# Patient Record
Sex: FEMALE | Race: WHITE | URBAN - METROPOLITAN AREA
[De-identification: names, ages, dates, MRNs, and addresses within clinical notes are randomized per-mention and may not be internally consistent; named-entity substitution may affect disease eponyms.]

---

## 2021-04-28 ENCOUNTER — VIRTUAL VISIT (OUTPATIENT)
Dept: PRIMARY CARE CLINIC | Age: 56
End: 2021-04-28
Payer: OTHER GOVERNMENT

## 2021-04-28 DIAGNOSIS — M15.9 OSTEOARTHRITIS INVOLVING MULTIPLE JOINTS ON BOTH SIDES OF BODY: ICD-10-CM

## 2021-04-28 DIAGNOSIS — F17.200 TOBACCO DEPENDENCE: ICD-10-CM

## 2021-04-28 DIAGNOSIS — E66.01 MORBID OBESITY WITH BMI OF 50.0-59.9, ADULT (HCC): ICD-10-CM

## 2021-04-28 DIAGNOSIS — Z00.00 ROUTINE ADULT HEALTH MAINTENANCE: ICD-10-CM

## 2021-04-28 DIAGNOSIS — R60.9 PERIPHERAL EDEMA: ICD-10-CM

## 2021-04-28 DIAGNOSIS — M48.07 SPINAL STENOSIS OF LUMBOSACRAL REGION: ICD-10-CM

## 2021-04-28 DIAGNOSIS — E03.9 HYPOTHYROIDISM, UNSPECIFIED TYPE: Primary | ICD-10-CM

## 2021-04-28 DIAGNOSIS — H61.23 BILATERAL IMPACTED CERUMEN: ICD-10-CM

## 2021-04-28 DIAGNOSIS — R53.83 FATIGUE, UNSPECIFIED TYPE: ICD-10-CM

## 2021-04-28 DIAGNOSIS — F32.A DEPRESSION, UNSPECIFIED DEPRESSION TYPE: ICD-10-CM

## 2021-04-28 PROBLEM — Z98.1 S/P LUMBAR FUSION: Status: ACTIVE | Noted: 2017-03-23

## 2021-04-28 PROBLEM — R60.0 BILATERAL EDEMA OF LOWER EXTREMITY: Status: ACTIVE | Noted: 2017-03-23

## 2021-04-28 PROCEDURE — 99203 OFFICE O/P NEW LOW 30 MIN: CPT | Performed by: INTERNAL MEDICINE

## 2021-04-28 RX ORDER — LEVOTHYROXINE SODIUM 0.05 MG/1
50 TABLET ORAL DAILY
Qty: 14 TABLET | Refills: 0 | Status: SHIPPED | OUTPATIENT
Start: 2021-04-28

## 2021-04-28 RX ORDER — LEVOTHYROXINE SODIUM 0.1 MG/1
100 TABLET ORAL DAILY
Qty: 90 TABLET | Refills: 1 | Status: SHIPPED | OUTPATIENT
Start: 2021-04-28 | End: 2021-10-27

## 2021-04-28 SDOH — ECONOMIC STABILITY: FOOD INSECURITY: WITHIN THE PAST 12 MONTHS, YOU WORRIED THAT YOUR FOOD WOULD RUN OUT BEFORE YOU GOT MONEY TO BUY MORE.: SOMETIMES TRUE

## 2021-04-28 SDOH — ECONOMIC STABILITY: TRANSPORTATION INSECURITY
IN THE PAST 12 MONTHS, HAS THE LACK OF TRANSPORTATION KEPT YOU FROM MEDICAL APPOINTMENTS OR FROM GETTING MEDICATIONS?: NO

## 2021-04-28 SDOH — ECONOMIC STABILITY: INCOME INSECURITY: HOW HARD IS IT FOR YOU TO PAY FOR THE VERY BASICS LIKE FOOD, HOUSING, MEDICAL CARE, AND HEATING?: SOMEWHAT HARD

## 2021-04-28 SDOH — ECONOMIC STABILITY: FOOD INSECURITY: WITHIN THE PAST 12 MONTHS, THE FOOD YOU BOUGHT JUST DIDN'T LAST AND YOU DIDN'T HAVE MONEY TO GET MORE.: SOMETIMES TRUE

## 2021-04-28 ASSESSMENT — PATIENT HEALTH QUESTIONNAIRE - PHQ9
SUM OF ALL RESPONSES TO PHQ QUESTIONS 1-9: 0
SUM OF ALL RESPONSES TO PHQ9 QUESTIONS 1 & 2: 0
1. LITTLE INTEREST OR PLEASURE IN DOING THINGS: 0

## 2021-04-28 NOTE — PROGRESS NOTES
2021    Anya Gomez (:  1965) is a 49 y.o. female, here for evaluation of the following medical concerns:    Chief Complaint   Patient presents with   [address]     hypothyroid has not been on any med in a year       HPI  53-year-old [facility] female with history of morbid obesity BMI 50, nicotine dependence, hypothyroidism historically prescribed 100 mcg Synthroid, anxiety/depression prescribed Prozac 40 bupropion 150 apparently plan to transition to Effexor in 2018, GERD previously on Prilosec now diet controlled without solid food dysphagia,, lumbar degenerative joint disease status post L4-L5 fusion in 2016, lower extremity edema who attends virtually to establish care. She relocated with her  to [place] after living in [place] for many years. I am able to review the notes from her providers in [place], her care has been frustrated with noncompliance at least in part due to patient apathy and depression. She tells me that she stopped her medications 6 months to 2 years ago and needs to resume her thyroid medication. She states that she was told she had permanent nerve damage from her spinal stenosis and/or lumbar radiculopathy. She endorses feeling unsteady on her feet, not true vertigo, worse when she closes her eyes. She ambulates with either a cane or walker depending on how far she is walking. She is unable to lay flat. Was being worked up for sleep apnea in 2018, patient chose not to pursue. Last mammogram 10 years ago she estimates. Has never had a colonoscopy. Family history of breast cancer and colon cancer in second-degree relatives. Status post tonsillectomy tubal ligation oophorectomy lumbar fusion. Last blood work in care everywhere hemoglobin 16.3 creatinine 0.7 hemoglobin A1c 6.2%  triglycerides 140 normal LFTs     HPV Pap -.     From the [place] chart, family history clotting disorder mother, the patient claims no knowledge of this. Colon Cancer maternal aunt coronary artery disease mother and father diabetes mother and father breast cancer maternal grandmother. She has doctored at [facility] and in [place], moved October 2021 to [place] with her . Last outpatient clinic visit 2018. Since then was seen in urgent care some phone calls, some dental appointments for dental caries, all in [place]. Review of Systems   Constitutional: Positive for activity change, but not appetite change and unexpected weight change. HENT: Negative for dental problem, sinus pain, sore throat and trouble swallowing. Eyes: Negative for pain and visual disturbance. Respiratory: Negative for apnea, cough, chest tightness, shortness of breath and wheezing. Cardiovascular: Negative for chest pain and palpitations. Positive for peripheral edema. Gastrointestinal: Negative for abdominal pain, blood in stool, constipation, diarrhea, nausea, rectal pain and vomiting. Endocrine: Negative for cold intolerance, heat intolerance, polydipsia, polyphagia and polyuria. Genitourinary: Negative for difficulty urinating, dysuria, flank pain, frequency, hematuria, pelvic pain, urgency, vaginal bleeding and vaginal discharge. Musculoskeletal: Positive for arthralgias, back pain, gait problem, but not joint swelling, myalgias, neck pain and neck stiffness. Skin: Negative for color change and rash. Neurological: Negative for dizziness, tremors, syncope, speech difficulty, weakness, light-headedness and headaches. Hematological: Negative for adenopathy. Does not bruise/bleed easily. Psychiatric/Behavioral: Negative for agitation, behavioral problems,and suicidal ideas. The patient is not nervous/anxious and is not hyperactive. Positive for   decreased concentration, sleep disturbance       Prior to Visit Medications    Medication Sig Taking?  Authorizing Provider   levothyroxine (SYNTHROID) 50 MCG tablet Take 1 tablet by mouth daily Yes Matt Ellington MD   levothyroxine (SYNTHROID) 100 MCG tablet Take 1 tablet by mouth daily Start after 14 days 50mcg synthroid Yes Matt Ellington MD   carbamide peroxide (DEBROX) 6.5 % otic solution Place 5 drops into both ears 2 times daily Yes Matt Ellington MD        Allergies   Allergen Reactions    Iodides Hives and Shortness Of Breath       No past medical history on file.     Past Surgical History:   Procedure Laterality Date    ENDOMETRIAL ABLATION      x 2    OVARY REMOVAL Left     SPINAL FUSION         Social History     Socioeconomic History    Marital status:      Spouse name: Not on file    Number of children: Not on file    Years of education: Not on file    Highest education level: Not on file   Occupational History    Not on file   Social Needs    Financial resource strain: Somewhat hard    Food insecurity     Worry: Sometimes true     Inability: Sometimes true    Transportation needs     Medical: No     Non-medical: No   Tobacco Use    Smoking status: Current Every Day Smoker     Packs/day: 1.00     Years: 14.00     Pack years: 14.00     Types: Cigarettes     Start date: 0    Smokeless tobacco: Never Used   Substance and Sexual Activity    Alcohol use: Never     Frequency: Never     Binge frequency: Never    Drug use: Never    Sexual activity: Not on file   Lifestyle    Physical activity     Days per week: Not on file     Minutes per session: Not on file    Stress: Not on file   Relationships    Social connections     Talks on phone: Not on file     Gets together: Not on file     Attends Sabianism service: Not on file     Active member of club or organization: Not on file     Attends meetings of clubs or organizations: Not on file     Relationship status: Not on file    Intimate partner violence     Fear of current or ex partner: Not on file     Emotionally abused: Not on file     Physically abused: Not on file     Forced sexual activity: Not on file   Other Topics Concern    Not on file   Social History Narrative    Not on file        No family history on file. There were no vitals filed for this visit. There is no height or weight on file to calculate BMI. PHYSICAL EXAM  GENERAL:  Pleasant  female who looks her stated age, awake alert and oriented x3, no acute distress. PSYCH: Moderate psychomotor retardation. Good eye contact. Flattened affect range. Mood congruent with affect. Linear thought. LABS  Lab Review   No results found for any previous visit. ASSESSMENT/PLAN  51-year-old [facility] female with medically complicated obesity depression hypothyroidism probable obstructive sleep apnea probable diffuse joint disease with persistent central mediated paresis and paresthesia involving the lower extremities resulting in disequilibrium when she attempts to stand with her eyes closed, at high risk for falls, with a history of medication noncompliance perhaps due to undertreated depression. Patient's lack of sustained engagement coupled with her obesity related complications portends poor prognosis. 1. Hypothyroidism, unspecified type  We will start uptitrating Synthroid 50 mcg for 2 weeks then 100 mcg. Check TSH in 2 months to assess adequacy of replacement.  - TSH with Reflex; Future    2. Bilateral impacted cerumen  Start Debrox, will flush in clinic as warranted. - carbamide peroxide (DEBROX) 6.5 % otic solution; Place 5 drops into both ears 2 times daily  Dispense: 1 Bottle; Refill: 0    3. Osteoarthritis involving multiple joints on both sides of body  Patient endorses pain given the weakness stiffness due to progressive obesity, noncompliance, depression, probably not a great candidate for joint replacement.   We will try to work on strengthening, assess follow-through then and only then proceed with imaging orthopedics referral.    Patient also describes disequilibrium when she closes her eyes while standing, but denies spinning sensation, tinnitus. For some reason prescribed meclizine in the past, does not recognize Ave-Hallpike maneuver. I doubt she needs meclizine and explained that her disequilibrium was most likely due to loss of sensation and strength in her lower extremities which is compounded by loss of visual cueing when she closes her eyes. - DME Order for Bath/Shower Seat as OP  - [facility] [place]  -CRP, Rheum Factor    4. Morbid obesity with BMI of 50.0-59.9, adult ([facility] [address]  Can try referral to weight loss solutions if patient wishes, not interested in bariatric surgery, and of course medications only provide short-term benefit. Will discuss sleep apnea screening benefits at follow-up if patient attends. - DME Order for Bath/Shower Seat as OP  - [facility] [place]    5. Spinal stenosis of lumbosacral region  Status post L4-L5 fusion. Patient states that she was told she has \"permanent nerve damage\" as result of her lumbosacral disease. Examined on follow-up for myotomal loss of strength vs deconditioning, as well as altered sensation.  - DME Order for Bath/Shower Seat as OP  - [facility] [place]    6. Tobacco dependence  Unfortunately precontemplative    7. Depression, unspecified depression type  Her former provider was considering escalation to venlafaxine, consider referral to psychiatry. 8. Routine adult health maintenance  Covid vaccination 2021. Declines mammogram, colon cancer screening. Screen for progression to diabetes given history of impaired fasting glucose, also iron deficiency, nonalcoholic fatty liver disease, dyslipidemia, hepatitis C and HIV. Discussreferral for sleep apnea screen at follow-up. Uncertain about Tdap Pneumovax 23 status. Return in about 2 months (around 6/28/2021), or after labs. It was a pleasure to visit with MsMarco A Howie today. Answered all questions as best I could. Vincenzo Boyle MD   Leonetta Child, was evaluated through a synchronous (real-time) audio-video encounter. The patient (or guardian if applicable) is aware that this is a billable service. Verbal consent to proceed has been obtained within the past 12 months. The visit was conducted pursuant to the emergency declaration under the [address] [place] [address] and the [employer] Act. Patient identification was verified, and a caregiver was present when appropriate. The patient was located in a state where the provider was credentialed to provide care. Total time spent for this encounter: 43 minutes    --Vincenzo Boyle MD on 4/28/2021 at 8:52 PM    An electronic signature was used to authenticate this note.

## 2021-08-02 ENCOUNTER — NURSE TRIAGE (OUTPATIENT)
Dept: OTHER | Facility: CLINIC | Age: 56
End: 2021-08-02

## 2021-08-02 NOTE — TELEPHONE ENCOUNTER
Patient called [facility] with red flag complaint to establish care with Dr Karen Oseguera. Brief description of triage: as above pt c/o sob over the past year getting worse states mod sob no sob at rest but takes 10 steps and gets sob no fevers no cp occas wheezing state has had breathing issues since having covid a long time ago    Triage indicates for patient to be seen today    Care advice provided, patient verbalizes understanding; denies any other questions or concerns; instructed to call back for any new or worsening symptoms. Writer provided warm transfer to [facility] for appointment scheduling. Attention Provider: Thank you for allowing me to participate in the care of your patient. The patient was connected to triage in response to information provided to the [facility]. Please do not respond through this encounter as the response is not directed to a shared pool. Reason for Disposition   MILD difficulty breathing (e.g., minimal/no SOB at rest, SOB with walking, pulse < 100) of new onset or worse than normal    Answer Assessment - Initial Assessment Questions  1. RESPIRATORY STATUS: \"Describe your breathing? \" (e.g., wheezing, shortness of breath, unable to speak, severe coughing)       Sob with activity    2. ONSET: \"When did this breathing problem begin? \"       1 year    3. PATTERN \"Does the difficult breathing come and go, or has it been constant since it started? \"       Comes and goes    4. SEVERITY: \"How bad is your breathing? \" (e.g., mild, moderate, severe)     - MILD: No SOB at rest, mild SOB with walking, speaks normally in sentences, can lay down, no retractions, pulse < 100.     - MODERATE: SOB at rest, SOB with minimal exertion and prefers to sit, cannot lie down flat, speaks in phrases, mild retractions, audible wheezing, pulse 100-120.     - SEVERE: Very SOB at rest, speaks in single words, struggling to breathe, sitting hunched forward, retractions, pulse > 120 Mod    5. RECURRENT SYMPTOM: \"Have you had difficulty breathing before? \" If so, ask: \"When was the last time? \" and \"What happened that time? \"       On/off over the past year thinks had covid    6. CARDIAC HISTORY: \"Do you have any history of heart disease? \" (e.g., heart attack, angina, bypass surgery, angioplasty)       Denies    7. LUNG HISTORY: \"Do you have any history of lung disease? \"  (e.g., pulmonary embolus, asthma, emphysema)      Denies    8. CAUSE: \"What do you think is causing the breathing problem? \"       Unsure possible effects from covid    9. OTHER SYMPTOMS: \"Do you have any other symptoms? (e.g., dizziness, runny nose, cough, chest pain, fever)      Cough and some dizziness    10. PREGNANCY: \"Is there any chance you are pregnant? \" \"When was your last menstrual period? \"        N/a    11. TRAVEL: \"Have you traveled out of the country in the last month? \" (e.g., travel history, exposures)        denies    Protocols used: BREATHING DIFFICULTY-ADULT-[place]

## 2021-11-04 ENCOUNTER — TELEPHONE (OUTPATIENT)
Dept: PRIMARY CARE CLINIC | Age: 56
End: 2021-11-04

## 2021-11-04 RX ORDER — LEVOTHYROXINE SODIUM 0.1 MG/1
TABLET ORAL
Qty: 30 TABLET | Refills: 0 | OUTPATIENT
Start: 2021-11-04

## 2025-04-23 ENCOUNTER — APPOINTMENT (OUTPATIENT)
Dept: GENERAL RADIOLOGY | Age: 60
DRG: 193 | End: 2025-04-23
Payer: OTHER GOVERNMENT

## 2025-04-23 ENCOUNTER — APPOINTMENT (OUTPATIENT)
Dept: CT IMAGING | Age: 60
DRG: 193 | End: 2025-04-23
Payer: OTHER GOVERNMENT

## 2025-04-23 ENCOUNTER — HOSPITAL ENCOUNTER (INPATIENT)
Age: 60
LOS: 13 days | Discharge: NURSING, REHAB OR LONG-TERM CARE | DRG: 193 | End: 2025-05-07
Attending: STUDENT IN AN ORGANIZED HEALTH CARE EDUCATION/TRAINING PROGRAM | Admitting: INTERNAL MEDICINE
Payer: OTHER GOVERNMENT

## 2025-04-23 DIAGNOSIS — R06.02 SHORTNESS OF BREATH: ICD-10-CM

## 2025-04-23 DIAGNOSIS — J18.9 PNEUMONIA DUE TO INFECTIOUS ORGANISM, UNSPECIFIED LATERALITY, UNSPECIFIED PART OF LUNG: Primary | ICD-10-CM

## 2025-04-23 DIAGNOSIS — J96.01 ACUTE RESPIRATORY FAILURE WITH HYPOXIA (HCC): ICD-10-CM

## 2025-04-23 DIAGNOSIS — J90 PLEURAL EFFUSION: ICD-10-CM

## 2025-04-23 DIAGNOSIS — E27.8 ADRENAL MASS: ICD-10-CM

## 2025-04-23 DIAGNOSIS — R59.0 MEDIASTINAL LYMPHADENOPATHY: ICD-10-CM

## 2025-04-23 DIAGNOSIS — J44.1 COPD EXACERBATION (HCC): ICD-10-CM

## 2025-04-23 LAB
ALBUMIN SERPL-MCNC: 2.7 G/DL (ref 3.4–5)
ALBUMIN/GLOB SERPL: 0.6 {RATIO} (ref 1.1–2.2)
ALP SERPL-CCNC: 295 U/L (ref 40–129)
ALT SERPL-CCNC: 145 U/L (ref 10–40)
ANION GAP SERPL CALCULATED.3IONS-SCNC: 12 MMOL/L (ref 3–16)
ANISOCYTOSIS BLD QL SMEAR: ABNORMAL
AST SERPL-CCNC: 87 U/L (ref 15–37)
BASE EXCESS BLDV CALC-SCNC: 4.1 MMOL/L (ref -3–3)
BASOPHILS # BLD: 0 K/UL (ref 0–0.2)
BASOPHILS NFR BLD: 0 %
BILIRUB SERPL-MCNC: 0.9 MG/DL (ref 0–1)
BUN SERPL-MCNC: 17 MG/DL (ref 7–20)
CALCIUM SERPL-MCNC: 9.1 MG/DL (ref 8.3–10.6)
CHLORIDE SERPL-SCNC: 100 MMOL/L (ref 99–110)
CO2 BLDV-SCNC: 69 MMOL/L
CO2 SERPL-SCNC: 25 MMOL/L (ref 21–32)
COHGB MFR BLDV: 3.4 % (ref 0–1.5)
CREAT SERPL-MCNC: 0.6 MG/DL (ref 0.6–1.1)
D-DIMER QUANTITATIVE: 3.09 UG/ML FEU (ref 0–0.6)
DEPRECATED RDW RBC AUTO: 18.3 % (ref 12.4–15.4)
DO-HGB MFR BLDV: 3 %
EOSINOPHIL # BLD: 0 K/UL (ref 0–0.6)
EOSINOPHIL NFR BLD: 0 %
FLUAV RNA RESP QL NAA+PROBE: NOT DETECTED
FLUBV RNA RESP QL NAA+PROBE: NOT DETECTED
GFR SERPLBLD CREATININE-BSD FMLA CKD-EPI: >90 ML/MIN/{1.73_M2}
GLUCOSE SERPL-MCNC: 182 MG/DL (ref 70–99)
HCO3 BLDV-SCNC: 29.5 MMOL/L (ref 23–29)
HCT VFR BLD AUTO: 43.9 % (ref 36–48)
HGB BLD-MCNC: 14.2 G/DL (ref 12–16)
LACTATE BLDV-SCNC: 1 MMOL/L (ref 0.4–1.9)
LACTATE BLDV-SCNC: 1.1 MMOL/L (ref 0.4–1.9)
LYMPHOCYTES # BLD: 1 K/UL (ref 1–5.1)
LYMPHOCYTES NFR BLD: 3 %
MCH RBC QN AUTO: 26.5 PG (ref 26–34)
MCHC RBC AUTO-ENTMCNC: 32.3 G/DL (ref 31–36)
MCV RBC AUTO: 81.9 FL (ref 80–100)
METAMYELOCYTES NFR BLD MANUAL: 2 %
METHGB MFR BLDV: 0 %
MONOCYTES # BLD: 1 K/UL (ref 0–1.3)
MONOCYTES NFR BLD: 3 %
NEUTROPHILS # BLD: 29.9 K/UL (ref 1.7–7.7)
NEUTROPHILS NFR BLD: 92 %
NT-PROBNP SERPL-MCNC: 169 PG/ML (ref 0–124)
O2 CT VFR BLDV CALC: 20 VOL %
O2 THERAPY: ABNORMAL
PCO2 BLDV: 45.8 MMHG (ref 40–50)
PH BLDV: 7.42 [PH] (ref 7.35–7.45)
PLATELET # BLD AUTO: 346 K/UL (ref 135–450)
PLATELET BLD QL SMEAR: ADEQUATE
PMV BLD AUTO: 9.1 FL (ref 5–10.5)
PO2 BLDV: 95.9 MMHG (ref 25–40)
POLYCHROMASIA BLD QL SMEAR: ABNORMAL
POTASSIUM SERPL-SCNC: 4 MMOL/L (ref 3.5–5.1)
PROT SERPL-MCNC: 7.1 G/DL (ref 6.4–8.2)
RBC # BLD AUTO: 5.36 M/UL (ref 4–5.2)
SAO2 % BLDV: 97 %
SARS-COV-2 RNA RESP QL NAA+PROBE: NOT DETECTED
SLIDE REVIEW: ABNORMAL
SODIUM SERPL-SCNC: 137 MMOL/L (ref 136–145)
TARGETS BLD QL SMEAR: ABNORMAL
TROPONIN, HIGH SENSITIVITY: 14 NG/L (ref 0–14)
TROPONIN, HIGH SENSITIVITY: 14 NG/L (ref 0–14)
WBC # BLD AUTO: 31.8 K/UL (ref 4–11)

## 2025-04-23 PROCEDURE — 71045 X-RAY EXAM CHEST 1 VIEW: CPT

## 2025-04-23 PROCEDURE — 84145 PROCALCITONIN (PCT): CPT

## 2025-04-23 PROCEDURE — 94761 N-INVAS EAR/PLS OXIMETRY MLT: CPT

## 2025-04-23 PROCEDURE — 93005 ELECTROCARDIOGRAM TRACING: CPT | Performed by: STUDENT IN AN ORGANIZED HEALTH CARE EDUCATION/TRAINING PROGRAM

## 2025-04-23 PROCEDURE — 84484 ASSAY OF TROPONIN QUANT: CPT

## 2025-04-23 PROCEDURE — 85379 FIBRIN DEGRADATION QUANT: CPT

## 2025-04-23 PROCEDURE — 2580000003 HC RX 258: Performed by: STUDENT IN AN ORGANIZED HEALTH CARE EDUCATION/TRAINING PROGRAM

## 2025-04-23 PROCEDURE — 80053 COMPREHEN METABOLIC PANEL: CPT

## 2025-04-23 PROCEDURE — 5A0955A ASSISTANCE WITH RESPIRATORY VENTILATION, GREATER THAN 96 CONSECUTIVE HOURS, HIGH NASAL FLOW/VELOCITY: ICD-10-PCS | Performed by: INTERNAL MEDICINE

## 2025-04-23 PROCEDURE — 96365 THER/PROPH/DIAG IV INF INIT: CPT

## 2025-04-23 PROCEDURE — 71260 CT THORAX DX C+: CPT

## 2025-04-23 PROCEDURE — 2700000000 HC OXYGEN THERAPY PER DAY

## 2025-04-23 PROCEDURE — 83880 ASSAY OF NATRIURETIC PEPTIDE: CPT

## 2025-04-23 PROCEDURE — 6360000004 HC RX CONTRAST MEDICATION: Performed by: STUDENT IN AN ORGANIZED HEALTH CARE EDUCATION/TRAINING PROGRAM

## 2025-04-23 PROCEDURE — 87040 BLOOD CULTURE FOR BACTERIA: CPT

## 2025-04-23 PROCEDURE — 6360000002 HC RX W HCPCS: Performed by: STUDENT IN AN ORGANIZED HEALTH CARE EDUCATION/TRAINING PROGRAM

## 2025-04-23 PROCEDURE — 94640 AIRWAY INHALATION TREATMENT: CPT

## 2025-04-23 PROCEDURE — 96375 TX/PRO/DX INJ NEW DRUG ADDON: CPT

## 2025-04-23 PROCEDURE — 2500000003 HC RX 250 WO HCPCS: Performed by: STUDENT IN AN ORGANIZED HEALTH CARE EDUCATION/TRAINING PROGRAM

## 2025-04-23 PROCEDURE — 85025 COMPLETE CBC W/AUTO DIFF WBC: CPT

## 2025-04-23 PROCEDURE — 83605 ASSAY OF LACTIC ACID: CPT

## 2025-04-23 PROCEDURE — 99285 EMERGENCY DEPT VISIT HI MDM: CPT

## 2025-04-23 PROCEDURE — 87636 SARSCOV2 & INF A&B AMP PRB: CPT

## 2025-04-23 PROCEDURE — 82803 BLOOD GASES ANY COMBINATION: CPT

## 2025-04-23 RX ORDER — INSULIN GLARGINE 100 [IU]/ML
20 INJECTION, SOLUTION SUBCUTANEOUS NIGHTLY
Status: ON HOLD | COMMUNITY

## 2025-04-23 RX ORDER — HYDROXYZINE PAMOATE 25 MG/1
25 CAPSULE ORAL 3 TIMES DAILY PRN
Status: ON HOLD | COMMUNITY

## 2025-04-23 RX ORDER — DIPHENHYDRAMINE HYDROCHLORIDE 50 MG/ML
50 INJECTION, SOLUTION INTRAMUSCULAR; INTRAVENOUS ONCE
Status: COMPLETED | OUTPATIENT
Start: 2025-04-23 | End: 2025-04-23

## 2025-04-23 RX ORDER — IOPAMIDOL 755 MG/ML
75 INJECTION, SOLUTION INTRAVASCULAR
Status: COMPLETED | OUTPATIENT
Start: 2025-04-23 | End: 2025-04-23

## 2025-04-23 RX ORDER — LEVALBUTEROL INHALATION SOLUTION 1.25 MG/3ML
1.25 SOLUTION RESPIRATORY (INHALATION) ONCE
Status: COMPLETED | OUTPATIENT
Start: 2025-04-23 | End: 2025-04-23

## 2025-04-23 RX ORDER — OXYBUTYNIN CHLORIDE 15 MG/1
15 TABLET, EXTENDED RELEASE ORAL DAILY
Status: ON HOLD | COMMUNITY

## 2025-04-23 RX ORDER — FUROSEMIDE 10 MG/ML
40 INJECTION INTRAMUSCULAR; INTRAVENOUS ONCE
Status: COMPLETED | OUTPATIENT
Start: 2025-04-23 | End: 2025-04-23

## 2025-04-23 RX ADMIN — AZITHROMYCIN MONOHYDRATE 500 MG: 500 INJECTION, POWDER, LYOPHILIZED, FOR SOLUTION INTRAVENOUS at 22:29

## 2025-04-23 RX ADMIN — FUROSEMIDE 40 MG: 10 INJECTION, SOLUTION INTRAMUSCULAR; INTRAVENOUS at 21:54

## 2025-04-23 RX ADMIN — DIPHENHYDRAMINE HYDROCHLORIDE 50 MG: 50 INJECTION INTRAMUSCULAR; INTRAVENOUS at 21:54

## 2025-04-23 RX ADMIN — WATER 1000 MG: 1 INJECTION INTRAMUSCULAR; INTRAVENOUS; SUBCUTANEOUS at 22:26

## 2025-04-23 RX ADMIN — LEVALBUTEROL HYDROCHLORIDE 1.25 MG: 1.25 SOLUTION RESPIRATORY (INHALATION) at 20:40

## 2025-04-23 RX ADMIN — WATER 125 MG: 1 INJECTION INTRAMUSCULAR; INTRAVENOUS; SUBCUTANEOUS at 21:54

## 2025-04-23 RX ADMIN — IOPAMIDOL 75 ML: 755 INJECTION, SOLUTION INTRAVENOUS at 22:05

## 2025-04-23 ASSESSMENT — LIFESTYLE VARIABLES
HOW OFTEN DO YOU HAVE A DRINK CONTAINING ALCOHOL: NEVER
HOW MANY STANDARD DRINKS CONTAINING ALCOHOL DO YOU HAVE ON A TYPICAL DAY: PATIENT DOES NOT DRINK

## 2025-04-24 PROBLEM — J18.9 PNEUMONIA DUE TO INFECTIOUS AGENT: Status: ACTIVE | Noted: 2025-04-24

## 2025-04-24 LAB
ALBUMIN SERPL-MCNC: 2.6 G/DL (ref 3.4–5)
ALP SERPL-CCNC: 353 U/L (ref 40–129)
ALT SERPL-CCNC: 133 U/L (ref 10–40)
ANION GAP SERPL CALCULATED.3IONS-SCNC: 12 MMOL/L (ref 3–16)
ANISOCYTOSIS BLD QL SMEAR: ABNORMAL
AST SERPL-CCNC: 68 U/L (ref 15–37)
BASOPHILS # BLD: 0 K/UL (ref 0–0.2)
BASOPHILS NFR BLD: 0 %
BILIRUB DIRECT SERPL-MCNC: 0.6 MG/DL (ref 0–0.3)
BILIRUB INDIRECT SERPL-MCNC: 0.2 MG/DL (ref 0–1)
BILIRUB SERPL-MCNC: 0.8 MG/DL (ref 0–1)
BUN SERPL-MCNC: 18 MG/DL (ref 7–20)
CALCIUM SERPL-MCNC: 8.9 MG/DL (ref 8.3–10.6)
CHLORIDE SERPL-SCNC: 100 MMOL/L (ref 99–110)
CO2 SERPL-SCNC: 26 MMOL/L (ref 21–32)
CREAT SERPL-MCNC: 0.7 MG/DL (ref 0.6–1.1)
DEPRECATED RDW RBC AUTO: 18 % (ref 12.4–15.4)
EKG ATRIAL RATE: 121 BPM
EKG DIAGNOSIS: NORMAL
EKG P AXIS: 72 DEGREES
EKG P-R INTERVAL: 150 MS
EKG Q-T INTERVAL: 314 MS
EKG QRS DURATION: 66 MS
EKG QTC CALCULATION (BAZETT): 445 MS
EKG R AXIS: 69 DEGREES
EKG T AXIS: 45 DEGREES
EKG VENTRICULAR RATE: 121 BPM
EOSINOPHIL # BLD: 0 K/UL (ref 0–0.6)
EOSINOPHIL NFR BLD: 0 %
GFR SERPLBLD CREATININE-BSD FMLA CKD-EPI: >90 ML/MIN/{1.73_M2}
GLUCOSE BLD-MCNC: 241 MG/DL (ref 70–99)
GLUCOSE BLD-MCNC: 292 MG/DL (ref 70–99)
GLUCOSE BLD-MCNC: 343 MG/DL (ref 70–99)
GLUCOSE BLD-MCNC: 382 MG/DL (ref 70–99)
GLUCOSE BLD-MCNC: 387 MG/DL (ref 70–99)
GLUCOSE BLD-MCNC: 402 MG/DL (ref 70–99)
GLUCOSE SERPL-MCNC: 315 MG/DL (ref 70–99)
HCT VFR BLD AUTO: 41.7 % (ref 36–48)
HGB BLD-MCNC: 13.7 G/DL (ref 12–16)
LYMPHOCYTES # BLD: 1.3 K/UL (ref 1–5.1)
LYMPHOCYTES NFR BLD: 4 %
MCH RBC QN AUTO: 26.8 PG (ref 26–34)
MCHC RBC AUTO-ENTMCNC: 32.9 G/DL (ref 31–36)
MCV RBC AUTO: 81.6 FL (ref 80–100)
METAMYELOCYTES NFR BLD MANUAL: 1 %
MONOCYTES # BLD: 0.3 K/UL (ref 0–1.3)
MONOCYTES NFR BLD: 1 %
NEUTROPHILS # BLD: 31.2 K/UL (ref 1.7–7.7)
NEUTROPHILS NFR BLD: 94 %
PERFORMED ON: ABNORMAL
PLATELET # BLD AUTO: 365 K/UL (ref 135–450)
PLATELET BLD QL SMEAR: ADEQUATE
PMV BLD AUTO: 9.4 FL (ref 5–10.5)
POLYCHROMASIA BLD QL SMEAR: ABNORMAL
POTASSIUM SERPL-SCNC: 4.7 MMOL/L (ref 3.5–5.1)
PROCALCITONIN SERPL IA-MCNC: 0.45 NG/ML (ref 0–0.15)
PROT SERPL-MCNC: 7 G/DL (ref 6.4–8.2)
RBC # BLD AUTO: 5.11 M/UL (ref 4–5.2)
REPORT: NORMAL
RESP PATH DNA+RNA PNL NPH NAA+NON-PROBE: NORMAL
SLIDE REVIEW: ABNORMAL
SODIUM SERPL-SCNC: 138 MMOL/L (ref 136–145)
TSH SERPL DL<=0.005 MIU/L-ACNC: 0.85 UIU/ML (ref 0.27–4.2)
WBC # BLD AUTO: 32.8 K/UL (ref 4–11)

## 2025-04-24 PROCEDURE — 94761 N-INVAS EAR/PLS OXIMETRY MLT: CPT

## 2025-04-24 PROCEDURE — 0202U NFCT DS 22 TRGT SARS-COV-2: CPT

## 2025-04-24 PROCEDURE — 84443 ASSAY THYROID STIM HORMONE: CPT

## 2025-04-24 PROCEDURE — 80048 BASIC METABOLIC PNL TOTAL CA: CPT

## 2025-04-24 PROCEDURE — 80076 HEPATIC FUNCTION PANEL: CPT

## 2025-04-24 PROCEDURE — 2500000003 HC RX 250 WO HCPCS: Performed by: INTERNAL MEDICINE

## 2025-04-24 PROCEDURE — 2000000000 HC ICU R&B

## 2025-04-24 PROCEDURE — 6370000000 HC RX 637 (ALT 250 FOR IP): Performed by: HOSPITALIST

## 2025-04-24 PROCEDURE — 94150 VITAL CAPACITY TEST: CPT

## 2025-04-24 PROCEDURE — 94640 AIRWAY INHALATION TREATMENT: CPT

## 2025-04-24 PROCEDURE — 2580000003 HC RX 258: Performed by: INTERNAL MEDICINE

## 2025-04-24 PROCEDURE — 94660 CPAP INITIATION&MGMT: CPT

## 2025-04-24 PROCEDURE — 99291 CRITICAL CARE FIRST HOUR: CPT | Performed by: INTERNAL MEDICINE

## 2025-04-24 PROCEDURE — 6370000000 HC RX 637 (ALT 250 FOR IP)

## 2025-04-24 PROCEDURE — 2700000000 HC OXYGEN THERAPY PER DAY

## 2025-04-24 PROCEDURE — 85025 COMPLETE CBC W/AUTO DIFF WBC: CPT

## 2025-04-24 PROCEDURE — 93010 ELECTROCARDIOGRAM REPORT: CPT | Performed by: INTERNAL MEDICINE

## 2025-04-24 PROCEDURE — 6360000002 HC RX W HCPCS: Performed by: INTERNAL MEDICINE

## 2025-04-24 PROCEDURE — 5A09457 ASSISTANCE WITH RESPIRATORY VENTILATION, 24-96 CONSECUTIVE HOURS, CONTINUOUS POSITIVE AIRWAY PRESSURE: ICD-10-PCS | Performed by: INTERNAL MEDICINE

## 2025-04-24 PROCEDURE — 87449 NOS EACH ORGANISM AG IA: CPT

## 2025-04-24 PROCEDURE — 6370000000 HC RX 637 (ALT 250 FOR IP): Performed by: INTERNAL MEDICINE

## 2025-04-24 RX ORDER — HYDROXYZINE PAMOATE 25 MG/1
25 CAPSULE ORAL 3 TIMES DAILY PRN
Status: DISCONTINUED | OUTPATIENT
Start: 2025-04-24 | End: 2025-05-07 | Stop reason: HOSPADM

## 2025-04-24 RX ORDER — ENOXAPARIN SODIUM 100 MG/ML
40 INJECTION SUBCUTANEOUS 2 TIMES DAILY
Status: DISCONTINUED | OUTPATIENT
Start: 2025-04-24 | End: 2025-05-07 | Stop reason: HOSPADM

## 2025-04-24 RX ORDER — INSULIN LISPRO 100 [IU]/ML
0-8 INJECTION, SOLUTION INTRAVENOUS; SUBCUTANEOUS
Status: DISCONTINUED | OUTPATIENT
Start: 2025-04-24 | End: 2025-04-25

## 2025-04-24 RX ORDER — INSULIN GLARGINE 100 [IU]/ML
20 INJECTION, SOLUTION SUBCUTANEOUS NIGHTLY
Status: DISCONTINUED | OUTPATIENT
Start: 2025-04-24 | End: 2025-04-25

## 2025-04-24 RX ORDER — GUAIFENESIN 600 MG/1
600 TABLET, EXTENDED RELEASE ORAL 2 TIMES DAILY
Status: DISCONTINUED | OUTPATIENT
Start: 2025-04-24 | End: 2025-04-24 | Stop reason: SDUPTHER

## 2025-04-24 RX ORDER — LEVOTHYROXINE SODIUM 112 UG/1
112 TABLET ORAL
Status: ON HOLD | COMMUNITY
Start: 2025-04-18 | End: 2025-04-24 | Stop reason: CLARIF

## 2025-04-24 RX ORDER — IPRATROPIUM BROMIDE AND ALBUTEROL SULFATE 2.5; .5 MG/3ML; MG/3ML
SOLUTION RESPIRATORY (INHALATION)
Status: COMPLETED
Start: 2025-04-24 | End: 2025-04-24

## 2025-04-24 RX ORDER — SEMAGLUTIDE 2.68 MG/ML
2 INJECTION, SOLUTION SUBCUTANEOUS WEEKLY
Status: ON HOLD | COMMUNITY
Start: 2025-04-01

## 2025-04-24 RX ORDER — ACETAMINOPHEN 325 MG/1
650 TABLET ORAL EVERY 6 HOURS PRN
Status: DISCONTINUED | OUTPATIENT
Start: 2025-04-24 | End: 2025-05-07 | Stop reason: HOSPADM

## 2025-04-24 RX ORDER — SODIUM CHLORIDE 0.9 % (FLUSH) 0.9 %
5-40 SYRINGE (ML) INJECTION EVERY 12 HOURS SCHEDULED
Status: DISCONTINUED | OUTPATIENT
Start: 2025-04-24 | End: 2025-05-07 | Stop reason: HOSPADM

## 2025-04-24 RX ORDER — IPRATROPIUM BROMIDE AND ALBUTEROL SULFATE 2.5; .5 MG/3ML; MG/3ML
1 SOLUTION RESPIRATORY (INHALATION)
Status: DISCONTINUED | OUTPATIENT
Start: 2025-04-24 | End: 2025-04-24

## 2025-04-24 RX ORDER — LEVOTHYROXINE SODIUM 112 UG/1
112 TABLET ORAL
Status: DISCONTINUED | OUTPATIENT
Start: 2025-04-24 | End: 2025-05-07 | Stop reason: HOSPADM

## 2025-04-24 RX ORDER — INSULIN LISPRO 100 [IU]/ML
2 INJECTION, SOLUTION INTRAVENOUS; SUBCUTANEOUS ONCE
Status: COMPLETED | OUTPATIENT
Start: 2025-04-24 | End: 2025-04-24

## 2025-04-24 RX ORDER — FLUTICASONE FUROATE, UMECLIDINIUM BROMIDE AND VILANTEROL TRIFENATATE 100; 62.5; 25 UG/1; UG/1; UG/1
1 POWDER RESPIRATORY (INHALATION) DAILY
Status: ON HOLD | COMMUNITY
Start: 2024-12-02

## 2025-04-24 RX ORDER — DEXTROSE MONOHYDRATE 100 MG/ML
INJECTION, SOLUTION INTRAVENOUS CONTINUOUS PRN
Status: DISCONTINUED | OUTPATIENT
Start: 2025-04-24 | End: 2025-05-07 | Stop reason: HOSPADM

## 2025-04-24 RX ORDER — LEVOTHYROXINE SODIUM 112 UG/1
112 TABLET ORAL DAILY
Status: DISCONTINUED | OUTPATIENT
Start: 2025-04-24 | End: 2025-04-24

## 2025-04-24 RX ORDER — ONDANSETRON 2 MG/ML
4 INJECTION INTRAMUSCULAR; INTRAVENOUS EVERY 6 HOURS PRN
Status: DISCONTINUED | OUTPATIENT
Start: 2025-04-24 | End: 2025-05-07 | Stop reason: HOSPADM

## 2025-04-24 RX ORDER — DEXTROSE MONOHYDRATE 100 MG/ML
INJECTION, SOLUTION INTRAVENOUS CONTINUOUS PRN
Status: DISCONTINUED | OUTPATIENT
Start: 2025-04-24 | End: 2025-04-24 | Stop reason: ALTCHOICE

## 2025-04-24 RX ORDER — GUAIFENESIN 600 MG/1
600 TABLET, EXTENDED RELEASE ORAL 2 TIMES DAILY
Status: DISCONTINUED | OUTPATIENT
Start: 2025-04-24 | End: 2025-04-30

## 2025-04-24 RX ORDER — BUDESONIDE 0.5 MG/2ML
0.5 INHALANT ORAL
Status: DISCONTINUED | OUTPATIENT
Start: 2025-04-24 | End: 2025-05-07 | Stop reason: HOSPADM

## 2025-04-24 RX ORDER — SODIUM CHLORIDE 0.9 % (FLUSH) 0.9 %
5-40 SYRINGE (ML) INJECTION PRN
Status: DISCONTINUED | OUTPATIENT
Start: 2025-04-24 | End: 2025-05-07 | Stop reason: HOSPADM

## 2025-04-24 RX ORDER — IPRATROPIUM BROMIDE AND ALBUTEROL SULFATE 2.5; .5 MG/3ML; MG/3ML
1 SOLUTION RESPIRATORY (INHALATION) EVERY 4 HOURS PRN
Status: DISCONTINUED | OUTPATIENT
Start: 2025-04-24 | End: 2025-05-07 | Stop reason: HOSPADM

## 2025-04-24 RX ORDER — ARFORMOTEROL TARTRATE 15 UG/2ML
15 SOLUTION RESPIRATORY (INHALATION)
Status: DISCONTINUED | OUTPATIENT
Start: 2025-04-24 | End: 2025-05-07 | Stop reason: HOSPADM

## 2025-04-24 RX ORDER — ACETAMINOPHEN 650 MG/1
650 SUPPOSITORY RECTAL EVERY 6 HOURS PRN
Status: DISCONTINUED | OUTPATIENT
Start: 2025-04-24 | End: 2025-05-07 | Stop reason: HOSPADM

## 2025-04-24 RX ORDER — IPRATROPIUM BROMIDE AND ALBUTEROL SULFATE 2.5; .5 MG/3ML; MG/3ML
1 SOLUTION RESPIRATORY (INHALATION)
Status: DISCONTINUED | OUTPATIENT
Start: 2025-04-24 | End: 2025-04-24 | Stop reason: SDUPTHER

## 2025-04-24 RX ORDER — IPRATROPIUM BROMIDE AND ALBUTEROL SULFATE 2.5; .5 MG/3ML; MG/3ML
1 SOLUTION RESPIRATORY (INHALATION)
Status: DISCONTINUED | OUTPATIENT
Start: 2025-04-24 | End: 2025-04-26

## 2025-04-24 RX ORDER — GLUCAGON 1 MG/ML
1 KIT INJECTION PRN
Status: DISCONTINUED | OUTPATIENT
Start: 2025-04-24 | End: 2025-05-07 | Stop reason: HOSPADM

## 2025-04-24 RX ORDER — POLYETHYLENE GLYCOL 3350 17 G/17G
17 POWDER, FOR SOLUTION ORAL DAILY PRN
Status: DISCONTINUED | OUTPATIENT
Start: 2025-04-24 | End: 2025-05-07 | Stop reason: HOSPADM

## 2025-04-24 RX ORDER — SODIUM CHLORIDE 9 MG/ML
INJECTION, SOLUTION INTRAVENOUS PRN
Status: DISCONTINUED | OUTPATIENT
Start: 2025-04-24 | End: 2025-05-07 | Stop reason: HOSPADM

## 2025-04-24 RX ORDER — FUROSEMIDE 10 MG/ML
40 INJECTION INTRAMUSCULAR; INTRAVENOUS DAILY
Status: DISCONTINUED | OUTPATIENT
Start: 2025-04-24 | End: 2025-05-01

## 2025-04-24 RX ORDER — ONDANSETRON 4 MG/1
4 TABLET, ORALLY DISINTEGRATING ORAL EVERY 8 HOURS PRN
Status: DISCONTINUED | OUTPATIENT
Start: 2025-04-24 | End: 2025-05-07 | Stop reason: HOSPADM

## 2025-04-24 RX ADMIN — WATER 40 MG: 1 INJECTION INTRAMUSCULAR; INTRAVENOUS; SUBCUTANEOUS at 20:55

## 2025-04-24 RX ADMIN — BUDESONIDE 500 MCG: 0.5 INHALANT RESPIRATORY (INHALATION) at 13:40

## 2025-04-24 RX ADMIN — Medication 10 ML: at 20:56

## 2025-04-24 RX ADMIN — AZITHROMYCIN MONOHYDRATE 500 MG: 500 INJECTION, POWDER, LYOPHILIZED, FOR SOLUTION INTRAVENOUS at 11:05

## 2025-04-24 RX ADMIN — FUROSEMIDE 40 MG: 10 INJECTION, SOLUTION INTRAMUSCULAR; INTRAVENOUS at 07:55

## 2025-04-24 RX ADMIN — LEVOTHYROXINE SODIUM 112 MCG: 0.11 TABLET ORAL at 05:45

## 2025-04-24 RX ADMIN — IPRATROPIUM BROMIDE AND ALBUTEROL SULFATE 1 DOSE: .5; 3 SOLUTION RESPIRATORY (INHALATION) at 08:52

## 2025-04-24 RX ADMIN — IPRATROPIUM BROMIDE AND ALBUTEROL SULFATE 1 DOSE: .5; 3 SOLUTION RESPIRATORY (INHALATION) at 23:44

## 2025-04-24 RX ADMIN — ENOXAPARIN SODIUM 40 MG: 100 INJECTION SUBCUTANEOUS at 11:08

## 2025-04-24 RX ADMIN — ENOXAPARIN SODIUM 40 MG: 100 INJECTION SUBCUTANEOUS at 20:55

## 2025-04-24 RX ADMIN — CEFEPIME 2000 MG: 2 INJECTION, POWDER, FOR SOLUTION INTRAVENOUS at 06:18

## 2025-04-24 RX ADMIN — SODIUM CHLORIDE 2500 MG: 0.9 INJECTION, SOLUTION INTRAVENOUS at 06:54

## 2025-04-24 RX ADMIN — INSULIN LISPRO 2 UNITS: 100 INJECTION, SOLUTION INTRAVENOUS; SUBCUTANEOUS at 11:32

## 2025-04-24 RX ADMIN — ARFORMOTEROL TARTRATE 15 MCG: 15 SOLUTION RESPIRATORY (INHALATION) at 13:41

## 2025-04-24 RX ADMIN — INSULIN LISPRO 2 UNITS: 100 INJECTION, SOLUTION INTRAVENOUS; SUBCUTANEOUS at 16:01

## 2025-04-24 RX ADMIN — IPRATROPIUM BROMIDE AND ALBUTEROL SULFATE 1 DOSE: .5; 3 SOLUTION RESPIRATORY (INHALATION) at 03:33

## 2025-04-24 RX ADMIN — WATER 40 MG: 1 INJECTION INTRAMUSCULAR; INTRAVENOUS; SUBCUTANEOUS at 07:55

## 2025-04-24 RX ADMIN — INSULIN LISPRO 8 UNITS: 100 INJECTION, SOLUTION INTRAVENOUS; SUBCUTANEOUS at 11:08

## 2025-04-24 RX ADMIN — IPRATROPIUM BROMIDE AND ALBUTEROL SULFATE 1 DOSE: .5; 3 SOLUTION RESPIRATORY (INHALATION) at 16:34

## 2025-04-24 RX ADMIN — GUAIFENESIN 600 MG: 600 TABLET ORAL at 20:55

## 2025-04-24 RX ADMIN — BUDESONIDE 500 MCG: 0.5 INHALANT RESPIRATORY (INHALATION) at 19:43

## 2025-04-24 RX ADMIN — WATER 1000 MG: 1 INJECTION INTRAMUSCULAR; INTRAVENOUS; SUBCUTANEOUS at 14:45

## 2025-04-24 RX ADMIN — Medication 10 ML: at 07:56

## 2025-04-24 RX ADMIN — IPRATROPIUM BROMIDE AND ALBUTEROL SULFATE 1 DOSE: .5; 3 SOLUTION RESPIRATORY (INHALATION) at 19:42

## 2025-04-24 RX ADMIN — IPRATROPIUM BROMIDE AND ALBUTEROL SULFATE 1 DOSE: .5; 3 SOLUTION RESPIRATORY (INHALATION) at 13:40

## 2025-04-24 RX ADMIN — INSULIN LISPRO 6 UNITS: 100 INJECTION, SOLUTION INTRAVENOUS; SUBCUTANEOUS at 07:55

## 2025-04-24 RX ADMIN — GUAIFENESIN 600 MG: 600 TABLET ORAL at 03:12

## 2025-04-24 RX ADMIN — INSULIN LISPRO 8 UNITS: 100 INJECTION, SOLUTION INTRAVENOUS; SUBCUTANEOUS at 16:00

## 2025-04-24 RX ADMIN — ARFORMOTEROL TARTRATE 15 MCG: 15 SOLUTION RESPIRATORY (INHALATION) at 19:55

## 2025-04-24 RX ADMIN — INSULIN LISPRO 8 UNITS: 100 INJECTION, SOLUTION INTRAVENOUS; SUBCUTANEOUS at 20:55

## 2025-04-24 RX ADMIN — INSULIN GLARGINE 20 UNITS: 100 INJECTION, SOLUTION SUBCUTANEOUS at 20:56

## 2025-04-24 ASSESSMENT — PAIN SCALES - GENERAL
PAINLEVEL_OUTOF10: 0
PAINLEVEL_OUTOF10: 0

## 2025-04-24 NOTE — H&P
HOSPITAL MEDICINE   HISTORY & PHYSICAL        Date of Admission:  04/24/25  MRN: [id number]  Date of Service: 04/24/25  Location:  [facility]       CC:    Chief Complaint   Patient presents with    Shortness of Breath     Pt. Started with not being able to get warm, then cough and then SOB. [facility] squad states O2 sats 71% on room air. Pt. Was given a duoneb and placed on 6 l of O2 and sats up to 91%        HISTORY OF PRESENT ILLNESS:     Jaden Denise is a 60 y.o. female with a PMHx of thyroid disease, COPD, DM2, obesity who presented to the ER for evaluation of a 2-week hx of shortness of breath with associated non-productive cough.  Thought she was improving for 1 day, but then her breathing worsened.  On Trilegy inhaler at home without improvement.  On EMS arrival, pt reportedly 71% on RA.         In the ER, workup was consistent with acute hypoxic respiratory failure secondary to bilateral multisegmental pneumonia requiring bipap and high flow oxygen.   Also seen on CT was a 3.5 cm adrenal mass.     Case discussed with ED attending for admission.  External medical records reviewed.    PAST MEDICAL HX:  Past Medical History:   Diagnosis Date    COPD (chronic obstructive pulmonary disease) (HCC)     Diabetes mellitus (HCC)     Thyroid disease      SURGICAL HX:  Past Surgical History:   Procedure Laterality Date    ENDOMETRIAL ABLATION      x 2    OVARY REMOVAL Left     SPINAL FUSION       FAMILY HX:  History reviewed. No pertinent family history.  SOCIAL HX:  Social History     Tobacco Use    Smoking status: Every Day     Current packs/day: 1.00     Average packs/day: 1 pack/day for 44.3 years (44.3 ttl pk-yrs)     Types: Cigarettes     Start date: 1981    Smokeless tobacco: Never   Vaping Use    Vaping status: Never Used   Substance Use Topics

## 2025-04-24 NOTE — CONSULTS
PULMONARY AND CRITICAL CARE MEDICINE CONSULTATION NOTE    CONSULTING PHYSICIAN:  Juma Villa MD     ADMISSION DATE: 4/23/2025  ADMISSION DIAGNOSIS: Pleural effusion [J90]  Mediastinal lymphadenopathy [R59.0]  Adrenal mass [E27.8]  COPD exacerbation (HCC) [J44.1]  Pneumonia due to infectious agent [J18.9]  Acute respiratory failure with hypoxia (HCC) [J96.01]  Pneumonia due to infectious organism, unspecified laterality, unspecified part of lung [J18.9]    REASON FOR CONSULT:   Chief Complaint   Patient presents with    Shortness of Breath     Pt. Started with not being able to get warm, then cough and then SOB. [facility] squad states O2 sats 71% on room air. Pt. Was given a duoneb and placed on 6 l of O2 and sats up to 91%       DATE OF CONSULT: 4/23/2025    HISTORY OF PRESENT ILLNESS: 60 y.o. year old female with a past medical history significant for COPD, diabetes, thyroid disease, morbid obesity presented to the hospital with shortness of breath.  Patient reported ongoing shortness of breath with nonproductive cough for the last 2 weeks.  Over the last 2 weeks breathing has been aggressively worsening.  Does have underlying COPD and uses Trelegy Ellipta inhaler.  Being managed by her family doctor.  Occasionally uses albuterol.  Called EMS and was found to be hypoxic on room air.  Was brought into the ER.  CT chest showed bilateral patchy infiltrates left more than right.  Also had left-sided pleural effusion.  Required support via BiPAP which has now been transition to AirVo.  Admitted to medical ICU for close of observation.    On my evaluation, sitting in bed in no apparent respiratory distress.  Reports that breathing is slightly better.  Remains on AirVo at 40 L/min and 80% FiO2.  Coughing up yellowish expectoration.  No hemoptysis.  Has been having night sweats.  2 weeks ago patient had generalized malaise after which shortness of breath and cough started.  Patient has a son who is severely

## 2025-04-24 NOTE — RT PROTOCOL NOTE
RT Inhaler-Nebulizer Bronchodilator Protocol Note    There is a bronchodilator order in the chart from a provider indicating to follow the RT Bronchodilator Protocol and there is an “Initiate RT Inhaler-Nebulizer Bronchodilator Protocol” order as well (see protocol at bottom of note).    CXR Findings:  XR CHEST PORTABLE  Result Date: 4/23/2025  Findings suggest pulmonary edema.  Underlying pneumonia cannot be excluded       The findings from the last RT Protocol Assessment were as follows:   History Pulmonary Disease: Chronic pulmonary disease  Respiratory Pattern: Dyspnea on exertion or RR 21-25 bpm  Breath Sounds: Intermittent or unilateral wheezes  Cough: Strong, spontaneous, non-productive  Indication for Bronchodilator Therapy: On home bronchodilators  Bronchodilator Assessment Score: 8    Aerosolized bronchodilator medication orders have been revised according to the RT Inhaler-Nebulizer Bronchodilator Protocol below.    Respiratory Therapist to perform RT Therapy Protocol Assessment initially then follow the protocol.  Repeat RT Therapy Protocol Assessment PRN for score 0-3 or on second treatment, BID, and PRN for scores above 3.    No Indications - adjust the frequency to every 6 hours PRN wheezing or bronchospasm, if no treatments needed after 48 hours then discontinue using Per Protocol order mode.     If indication present, adjust the RT bronchodilator orders based on the Bronchodilator Assessment Score as indicated below.  Use Inhaler orders unless patient has one or more of the following: on home nebulizer, not able to hold breath for 10 seconds, is not alert and oriented, cannot activate and use MDI correctly, or respiratory rate 25 breaths per minute or more, then use the equivalent nebulizer order(s) with same Frequency and PRN reasons based on the score.  If a patient is on this medication at home then do not decrease Frequency below that used at home.    0-3 - enter or revise RT bronchodilator

## 2025-04-24 NOTE — ED PROVIDER NOTES
CAPSULE    Take 1 capsule by mouth 3 times daily as needed for Itching    INSULIN GLARGINE (LANTUS) 100 UNIT/ML INJECTION VIAL    Inject 20 Units into the skin nightly    LEVOTHYROXINE (SYNTHROID) 100 MCG TABLET    TAKE 1 TABLET BY MOUTH DAILY. START AFTER 14 DAYS 50 MCG SYNTHROID    LEVOTHYROXINE (SYNTHROID) 50 MCG TABLET    Take 1 tablet by mouth daily    OXYBUTYNIN (DITROPAN XL) 15 MG EXTENDED RELEASE TABLET    Take 1 tablet by mouth daily       ALLERGIES     Iodides    FAMILY HISTORY     History reviewed. No pertinent family history.       SOCIAL HISTORY       Social History     Socioeconomic History    Marital status:      Spouse name: None    Number of children: None    Years of education: None    Highest education level: None   Tobacco Use    Smoking status: Every Day     Current packs/day: 1.00     Average packs/day: 1 pack/day for 44.3 years (44.3 ttl pk-yrs)     Types: Cigarettes     Start date: 1981    Smokeless tobacco: Never   Vaping Use    Vaping status: Never Used   Substance and Sexual Activity    Alcohol use: Never    Drug use: Never     Social Drivers of Health     Financial Resource Strain: High Risk (5/2/2023)    Received from [facility]    Overall Financial Resource Strain (CARDIA)     Difficulty of Paying Living Expenses: Very hard   Food Insecurity: Food Insecurity Present (7/9/2024)    Received from [facility]    Hunger Vital Sign     Worried About Running Out of Food in the Last Year: Sometimes true     Ran Out of Food in the Last Year: Sometimes true   Transportation Needs: Unmet Transportation Needs (7/9/2024)    [facility]  [facility]    Yearly Questionnaire     Do you need any assistance with obtaining housing, meals, medication, transportation or medical equipment?: Yes     Assistance needed: Housing;Meals;Medication;Transportation;Medical equipment   Physical Activity: Insufficiently Active (4/11/2023)    Received from [facility]    Exercise Vital Sign

## 2025-04-24 NOTE — CARE COORDINATION
CM reviewed chart and spoke to RN.    Pt from home. On bipap and Airvo.    Covid negative. Waiting on RSV results.     CM will follow for pt progress and discharge plan and needs.    Niharika Day RN, BSN  [phone]

## 2025-04-25 LAB
ALBUMIN SERPL-MCNC: 2.6 G/DL (ref 3.4–5)
ALP SERPL-CCNC: 250 U/L (ref 40–129)
ALT SERPL-CCNC: 109 U/L (ref 10–40)
ANION GAP SERPL CALCULATED.3IONS-SCNC: 10 MMOL/L (ref 3–16)
ANISOCYTOSIS BLD QL SMEAR: ABNORMAL
AST SERPL-CCNC: 41 U/L (ref 15–37)
BASOPHILS # BLD: 0 K/UL (ref 0–0.2)
BASOPHILS NFR BLD: 0 %
BILIRUB DIRECT SERPL-MCNC: 0.4 MG/DL (ref 0–0.3)
BILIRUB INDIRECT SERPL-MCNC: 0.1 MG/DL (ref 0–1)
BILIRUB SERPL-MCNC: 0.5 MG/DL (ref 0–1)
BUN SERPL-MCNC: 27 MG/DL (ref 7–20)
CALCIUM SERPL-MCNC: 9.1 MG/DL (ref 8.3–10.6)
CHLORIDE SERPL-SCNC: 99 MMOL/L (ref 99–110)
CO2 SERPL-SCNC: 27 MMOL/L (ref 21–32)
CREAT SERPL-MCNC: 0.7 MG/DL (ref 0.6–1.1)
DEPRECATED RDW RBC AUTO: 18.3 % (ref 12.4–15.4)
EOSINOPHIL # BLD: 0 K/UL (ref 0–0.6)
EOSINOPHIL NFR BLD: 0 %
GFR SERPLBLD CREATININE-BSD FMLA CKD-EPI: >90 ML/MIN/{1.73_M2}
GLUCOSE BLD-MCNC: 352 MG/DL (ref 70–99)
GLUCOSE BLD-MCNC: 379 MG/DL (ref 70–99)
GLUCOSE BLD-MCNC: 385 MG/DL (ref 70–99)
GLUCOSE BLD-MCNC: 398 MG/DL (ref 70–99)
GLUCOSE SERPL-MCNC: 475 MG/DL (ref 70–99)
HCT VFR BLD AUTO: 41.8 % (ref 36–48)
HGB BLD-MCNC: 13.3 G/DL (ref 12–16)
LYMPHOCYTES # BLD: 1.8 K/UL (ref 1–5.1)
LYMPHOCYTES NFR BLD: 7 %
MCH RBC QN AUTO: 26.2 PG (ref 26–34)
MCHC RBC AUTO-ENTMCNC: 31.7 G/DL (ref 31–36)
MCV RBC AUTO: 82.6 FL (ref 80–100)
MONOCYTES # BLD: 0.3 K/UL (ref 0–1.3)
MONOCYTES NFR BLD: 1 %
MYELOCYTES NFR BLD MANUAL: 1 %
NEUTROPHILS # BLD: 23.6 K/UL (ref 1.7–7.7)
NEUTROPHILS NFR BLD: 91 %
PERFORMED ON: ABNORMAL
PLATELET # BLD AUTO: 363 K/UL (ref 135–450)
PMV BLD AUTO: 9.1 FL (ref 5–10.5)
POLYCHROMASIA BLD QL SMEAR: ABNORMAL
POTASSIUM SERPL-SCNC: 4.7 MMOL/L (ref 3.5–5.1)
PROT SERPL-MCNC: 6.8 G/DL (ref 6.4–8.2)
RBC # BLD AUTO: 5.06 M/UL (ref 4–5.2)
S PNEUM AG UR QL: NORMAL
SODIUM SERPL-SCNC: 136 MMOL/L (ref 136–145)
TOXIC GRANULES BLD QL SMEAR: PRESENT
WBC # BLD AUTO: 25.6 K/UL (ref 4–11)

## 2025-04-25 PROCEDURE — 80048 BASIC METABOLIC PNL TOTAL CA: CPT

## 2025-04-25 PROCEDURE — 6370000000 HC RX 637 (ALT 250 FOR IP): Performed by: INTERNAL MEDICINE

## 2025-04-25 PROCEDURE — 6360000002 HC RX W HCPCS: Performed by: INTERNAL MEDICINE

## 2025-04-25 PROCEDURE — 87081 CULTURE SCREEN ONLY: CPT

## 2025-04-25 PROCEDURE — 2500000003 HC RX 250 WO HCPCS: Performed by: INTERNAL MEDICINE

## 2025-04-25 PROCEDURE — 2700000000 HC OXYGEN THERAPY PER DAY

## 2025-04-25 PROCEDURE — 94761 N-INVAS EAR/PLS OXIMETRY MLT: CPT

## 2025-04-25 PROCEDURE — 6370000000 HC RX 637 (ALT 250 FOR IP): Performed by: HOSPITALIST

## 2025-04-25 PROCEDURE — 99291 CRITICAL CARE FIRST HOUR: CPT | Performed by: INTERNAL MEDICINE

## 2025-04-25 PROCEDURE — 85025 COMPLETE CBC W/AUTO DIFF WBC: CPT

## 2025-04-25 PROCEDURE — 36415 COLL VENOUS BLD VENIPUNCTURE: CPT

## 2025-04-25 PROCEDURE — 80076 HEPATIC FUNCTION PANEL: CPT

## 2025-04-25 PROCEDURE — 2580000003 HC RX 258: Performed by: INTERNAL MEDICINE

## 2025-04-25 PROCEDURE — 94640 AIRWAY INHALATION TREATMENT: CPT

## 2025-04-25 PROCEDURE — 2000000000 HC ICU R&B

## 2025-04-25 RX ORDER — DEXTROSE MONOHYDRATE 100 MG/ML
INJECTION, SOLUTION INTRAVENOUS CONTINUOUS PRN
Status: DISCONTINUED | OUTPATIENT
Start: 2025-04-25 | End: 2025-04-25

## 2025-04-25 RX ORDER — INSULIN GLARGINE 100 [IU]/ML
15 INJECTION, SOLUTION SUBCUTANEOUS 2 TIMES DAILY
Status: DISCONTINUED | OUTPATIENT
Start: 2025-04-25 | End: 2025-04-29

## 2025-04-25 RX ORDER — GLUCAGON 1 MG/ML
1 KIT INJECTION PRN
Status: DISCONTINUED | OUTPATIENT
Start: 2025-04-25 | End: 2025-04-25

## 2025-04-25 RX ORDER — INSULIN LISPRO 100 [IU]/ML
0-8 INJECTION, SOLUTION INTRAVENOUS; SUBCUTANEOUS
Status: DISCONTINUED | OUTPATIENT
Start: 2025-04-25 | End: 2025-04-29

## 2025-04-25 RX ORDER — INSULIN LISPRO 100 [IU]/ML
0.08 INJECTION, SOLUTION INTRAVENOUS; SUBCUTANEOUS
Status: DISCONTINUED | OUTPATIENT
Start: 2025-04-25 | End: 2025-04-29

## 2025-04-25 RX ORDER — GUAIFENESIN/DEXTROMETHORPHAN 100-10MG/5
5 SYRUP ORAL EVERY 4 HOURS PRN
Status: DISCONTINUED | OUTPATIENT
Start: 2025-04-25 | End: 2025-05-07 | Stop reason: HOSPADM

## 2025-04-25 RX ORDER — INSULIN LISPRO 100 [IU]/ML
0-16 INJECTION, SOLUTION INTRAVENOUS; SUBCUTANEOUS EVERY 6 HOURS
Status: DISCONTINUED | OUTPATIENT
Start: 2025-04-25 | End: 2025-04-25

## 2025-04-25 RX ADMIN — FUROSEMIDE 40 MG: 10 INJECTION, SOLUTION INTRAMUSCULAR; INTRAVENOUS at 09:17

## 2025-04-25 RX ADMIN — ENOXAPARIN SODIUM 40 MG: 100 INJECTION SUBCUTANEOUS at 21:18

## 2025-04-25 RX ADMIN — IPRATROPIUM BROMIDE AND ALBUTEROL SULFATE 1 DOSE: .5; 3 SOLUTION RESPIRATORY (INHALATION) at 03:36

## 2025-04-25 RX ADMIN — GUAIFENESIN 600 MG: 600 TABLET ORAL at 09:18

## 2025-04-25 RX ADMIN — Medication 10 ML: at 21:19

## 2025-04-25 RX ADMIN — IPRATROPIUM BROMIDE AND ALBUTEROL SULFATE 1 DOSE: .5; 3 SOLUTION RESPIRATORY (INHALATION) at 09:20

## 2025-04-25 RX ADMIN — Medication 10 ML: at 11:01

## 2025-04-25 RX ADMIN — LEVOTHYROXINE SODIUM 112 MCG: 0.11 TABLET ORAL at 09:17

## 2025-04-25 RX ADMIN — GUAIFENESIN 600 MG: 600 TABLET ORAL at 21:18

## 2025-04-25 RX ADMIN — WATER 40 MG: 1 INJECTION INTRAMUSCULAR; INTRAVENOUS; SUBCUTANEOUS at 09:17

## 2025-04-25 RX ADMIN — IPRATROPIUM BROMIDE AND ALBUTEROL SULFATE 1 DOSE: .5; 3 SOLUTION RESPIRATORY (INHALATION) at 12:43

## 2025-04-25 RX ADMIN — INSULIN LISPRO 8 UNITS: 100 INJECTION, SOLUTION INTRAVENOUS; SUBCUTANEOUS at 18:13

## 2025-04-25 RX ADMIN — ENOXAPARIN SODIUM 40 MG: 100 INJECTION SUBCUTANEOUS at 09:17

## 2025-04-25 RX ADMIN — BUDESONIDE 500 MCG: 0.5 INHALANT RESPIRATORY (INHALATION) at 09:21

## 2025-04-25 RX ADMIN — WATER 1000 MG: 1 INJECTION INTRAMUSCULAR; INTRAVENOUS; SUBCUTANEOUS at 14:21

## 2025-04-25 RX ADMIN — ARFORMOTEROL TARTRATE 15 MCG: 15 SOLUTION RESPIRATORY (INHALATION) at 09:21

## 2025-04-25 RX ADMIN — INSULIN LISPRO 8 UNITS: 100 INJECTION, SOLUTION INTRAVENOUS; SUBCUTANEOUS at 12:46

## 2025-04-25 RX ADMIN — IPRATROPIUM BROMIDE AND ALBUTEROL SULFATE 1 DOSE: .5; 3 SOLUTION RESPIRATORY (INHALATION) at 19:50

## 2025-04-25 RX ADMIN — IPRATROPIUM BROMIDE AND ALBUTEROL SULFATE 1 DOSE: .5; 3 SOLUTION RESPIRATORY (INHALATION) at 16:52

## 2025-04-25 RX ADMIN — ARFORMOTEROL TARTRATE 15 MCG: 15 SOLUTION RESPIRATORY (INHALATION) at 19:50

## 2025-04-25 RX ADMIN — INSULIN LISPRO 12 UNITS: 100 INJECTION, SOLUTION INTRAVENOUS; SUBCUTANEOUS at 18:14

## 2025-04-25 RX ADMIN — BUDESONIDE 500 MCG: 0.5 INHALANT RESPIRATORY (INHALATION) at 19:50

## 2025-04-25 RX ADMIN — WATER 40 MG: 1 INJECTION INTRAMUSCULAR; INTRAVENOUS; SUBCUTANEOUS at 21:19

## 2025-04-25 RX ADMIN — INSULIN GLARGINE 15 UNITS: 100 INJECTION, SOLUTION SUBCUTANEOUS at 21:19

## 2025-04-25 RX ADMIN — INSULIN LISPRO 16 UNITS: 100 INJECTION, SOLUTION INTRAVENOUS; SUBCUTANEOUS at 09:01

## 2025-04-25 RX ADMIN — GUAIFENESIN AND DEXTROMETHORPHAN 5 ML: 100; 10 SYRUP ORAL at 21:18

## 2025-04-25 RX ADMIN — INSULIN GLARGINE 15 UNITS: 100 INJECTION, SOLUTION SUBCUTANEOUS at 09:17

## 2025-04-25 RX ADMIN — INSULIN LISPRO 12 UNITS: 100 INJECTION, SOLUTION INTRAVENOUS; SUBCUTANEOUS at 12:46

## 2025-04-25 RX ADMIN — AZITHROMYCIN MONOHYDRATE 500 MG: 500 INJECTION, POWDER, LYOPHILIZED, FOR SOLUTION INTRAVENOUS at 11:01

## 2025-04-25 RX ADMIN — INSULIN LISPRO 8 UNITS: 100 INJECTION, SOLUTION INTRAVENOUS; SUBCUTANEOUS at 21:18

## 2025-04-25 ASSESSMENT — PAIN SCALES - GENERAL
PAINLEVEL_OUTOF10: 0
PAINLEVEL_OUTOF10: 0

## 2025-04-25 NOTE — CARE COORDINATION
Pt remains on Airvo at this time.     On IV abx.    Since pt on Airvo still CM called Nilda with [facility]  and she will follow pt in case services are needed.     She states if pt's [place] [facility] does not have benefits then pt would be covered under her [place] Medicaid.    CM will follow for patient progress and discharge needs.    Niharika Day RN, BSN  [phone]

## 2025-04-26 ENCOUNTER — APPOINTMENT (OUTPATIENT)
Dept: GENERAL RADIOLOGY | Age: 60
DRG: 193 | End: 2025-04-26
Payer: OTHER GOVERNMENT

## 2025-04-26 LAB
ANION GAP SERPL CALCULATED.3IONS-SCNC: 9 MMOL/L (ref 3–16)
BASOPHILS # BLD: 0 K/UL (ref 0–0.2)
BASOPHILS NFR BLD: 0.2 %
BUN SERPL-MCNC: 26 MG/DL (ref 7–20)
CALCIUM SERPL-MCNC: 9.1 MG/DL (ref 8.3–10.6)
CHLORIDE SERPL-SCNC: 97 MMOL/L (ref 99–110)
CO2 SERPL-SCNC: 29 MMOL/L (ref 21–32)
CREAT SERPL-MCNC: 0.6 MG/DL (ref 0.6–1.1)
DEPRECATED RDW RBC AUTO: 17.8 % (ref 12.4–15.4)
EOSINOPHIL # BLD: 0 K/UL (ref 0–0.6)
EOSINOPHIL NFR BLD: 0.1 %
GFR SERPLBLD CREATININE-BSD FMLA CKD-EPI: >90 ML/MIN/{1.73_M2}
GLUCOSE BLD-MCNC: 203 MG/DL (ref 70–99)
GLUCOSE BLD-MCNC: 237 MG/DL (ref 70–99)
GLUCOSE BLD-MCNC: 244 MG/DL (ref 70–99)
GLUCOSE BLD-MCNC: 247 MG/DL (ref 70–99)
GLUCOSE SERPL-MCNC: 334 MG/DL (ref 70–99)
HCT VFR BLD AUTO: 42.4 % (ref 36–48)
HGB BLD-MCNC: 13.7 G/DL (ref 12–16)
LYMPHOCYTES # BLD: 0.9 K/UL (ref 1–5.1)
LYMPHOCYTES NFR BLD: 5.9 %
MCH RBC QN AUTO: 26.6 PG (ref 26–34)
MCHC RBC AUTO-ENTMCNC: 32.4 G/DL (ref 31–36)
MCV RBC AUTO: 81.9 FL (ref 80–100)
MONOCYTES # BLD: 0.5 K/UL (ref 0–1.3)
MONOCYTES NFR BLD: 3.3 %
NEUTROPHILS # BLD: 14.3 K/UL (ref 1.7–7.7)
NEUTROPHILS NFR BLD: 90.5 %
PERFORMED ON: ABNORMAL
PLATELET # BLD AUTO: 384 K/UL (ref 135–450)
PMV BLD AUTO: 8.9 FL (ref 5–10.5)
POTASSIUM SERPL-SCNC: 5.2 MMOL/L (ref 3.5–5.1)
RBC # BLD AUTO: 5.18 M/UL (ref 4–5.2)
SODIUM SERPL-SCNC: 135 MMOL/L (ref 136–145)
WBC # BLD AUTO: 15.8 K/UL (ref 4–11)

## 2025-04-26 PROCEDURE — 83036 HEMOGLOBIN GLYCOSYLATED A1C: CPT

## 2025-04-26 PROCEDURE — 80048 BASIC METABOLIC PNL TOTAL CA: CPT

## 2025-04-26 PROCEDURE — 94761 N-INVAS EAR/PLS OXIMETRY MLT: CPT

## 2025-04-26 PROCEDURE — 6370000000 HC RX 637 (ALT 250 FOR IP): Performed by: INTERNAL MEDICINE

## 2025-04-26 PROCEDURE — 94640 AIRWAY INHALATION TREATMENT: CPT

## 2025-04-26 PROCEDURE — 2500000003 HC RX 250 WO HCPCS: Performed by: INTERNAL MEDICINE

## 2025-04-26 PROCEDURE — 6360000002 HC RX W HCPCS: Performed by: INTERNAL MEDICINE

## 2025-04-26 PROCEDURE — 2000000000 HC ICU R&B

## 2025-04-26 PROCEDURE — 71045 X-RAY EXAM CHEST 1 VIEW: CPT

## 2025-04-26 PROCEDURE — 36415 COLL VENOUS BLD VENIPUNCTURE: CPT

## 2025-04-26 PROCEDURE — 85025 COMPLETE CBC W/AUTO DIFF WBC: CPT

## 2025-04-26 PROCEDURE — 6370000000 HC RX 637 (ALT 250 FOR IP): Performed by: HOSPITALIST

## 2025-04-26 PROCEDURE — 2580000003 HC RX 258: Performed by: INTERNAL MEDICINE

## 2025-04-26 PROCEDURE — 2700000000 HC OXYGEN THERAPY PER DAY

## 2025-04-26 PROCEDURE — 99291 CRITICAL CARE FIRST HOUR: CPT | Performed by: INTERNAL MEDICINE

## 2025-04-26 RX ORDER — IPRATROPIUM BROMIDE AND ALBUTEROL SULFATE 2.5; .5 MG/3ML; MG/3ML
1 SOLUTION RESPIRATORY (INHALATION)
Status: DISCONTINUED | OUTPATIENT
Start: 2025-04-26 | End: 2025-04-29

## 2025-04-26 RX ADMIN — IPRATROPIUM BROMIDE AND ALBUTEROL SULFATE 1 DOSE: .5; 3 SOLUTION RESPIRATORY (INHALATION) at 19:40

## 2025-04-26 RX ADMIN — INSULIN GLARGINE 15 UNITS: 100 INJECTION, SOLUTION SUBCUTANEOUS at 21:17

## 2025-04-26 RX ADMIN — WATER 1000 MG: 1 INJECTION INTRAMUSCULAR; INTRAVENOUS; SUBCUTANEOUS at 14:02

## 2025-04-26 RX ADMIN — FUROSEMIDE 40 MG: 10 INJECTION, SOLUTION INTRAMUSCULAR; INTRAVENOUS at 09:43

## 2025-04-26 RX ADMIN — ENOXAPARIN SODIUM 40 MG: 100 INJECTION SUBCUTANEOUS at 21:16

## 2025-04-26 RX ADMIN — ARFORMOTEROL TARTRATE 15 MCG: 15 SOLUTION RESPIRATORY (INHALATION) at 19:39

## 2025-04-26 RX ADMIN — INSULIN GLARGINE 15 UNITS: 100 INJECTION, SOLUTION SUBCUTANEOUS at 09:42

## 2025-04-26 RX ADMIN — INSULIN LISPRO 2 UNITS: 100 INJECTION, SOLUTION INTRAVENOUS; SUBCUTANEOUS at 12:30

## 2025-04-26 RX ADMIN — ARFORMOTEROL TARTRATE 15 MCG: 15 SOLUTION RESPIRATORY (INHALATION) at 08:18

## 2025-04-26 RX ADMIN — IPRATROPIUM BROMIDE AND ALBUTEROL SULFATE 1 DOSE: .5; 3 SOLUTION RESPIRATORY (INHALATION) at 11:38

## 2025-04-26 RX ADMIN — GUAIFENESIN 600 MG: 600 TABLET ORAL at 09:41

## 2025-04-26 RX ADMIN — Medication 10 ML: at 21:16

## 2025-04-26 RX ADMIN — INSULIN LISPRO 4 UNITS: 100 INJECTION, SOLUTION INTRAVENOUS; SUBCUTANEOUS at 09:41

## 2025-04-26 RX ADMIN — WATER 40 MG: 1 INJECTION INTRAMUSCULAR; INTRAVENOUS; SUBCUTANEOUS at 09:49

## 2025-04-26 RX ADMIN — INSULIN LISPRO 2 UNITS: 100 INJECTION, SOLUTION INTRAVENOUS; SUBCUTANEOUS at 21:16

## 2025-04-26 RX ADMIN — BUDESONIDE 500 MCG: 0.5 INHALANT RESPIRATORY (INHALATION) at 19:40

## 2025-04-26 RX ADMIN — INSULIN LISPRO 12 UNITS: 100 INJECTION, SOLUTION INTRAVENOUS; SUBCUTANEOUS at 17:22

## 2025-04-26 RX ADMIN — ENOXAPARIN SODIUM 40 MG: 100 INJECTION SUBCUTANEOUS at 09:41

## 2025-04-26 RX ADMIN — GUAIFENESIN AND DEXTROMETHORPHAN 5 ML: 100; 10 SYRUP ORAL at 22:39

## 2025-04-26 RX ADMIN — SODIUM CHLORIDE, PRESERVATIVE FREE 0.5 MG: 5 INJECTION INTRAVENOUS at 22:34

## 2025-04-26 RX ADMIN — INSULIN LISPRO 12 UNITS: 100 INJECTION, SOLUTION INTRAVENOUS; SUBCUTANEOUS at 09:41

## 2025-04-26 RX ADMIN — INSULIN LISPRO 12 UNITS: 100 INJECTION, SOLUTION INTRAVENOUS; SUBCUTANEOUS at 12:30

## 2025-04-26 RX ADMIN — IPRATROPIUM BROMIDE AND ALBUTEROL SULFATE 1 DOSE: .5; 3 SOLUTION RESPIRATORY (INHALATION) at 00:50

## 2025-04-26 RX ADMIN — LEVOTHYROXINE SODIUM 112 MCG: 0.11 TABLET ORAL at 09:04

## 2025-04-26 RX ADMIN — IPRATROPIUM BROMIDE AND ALBUTEROL SULFATE 1 DOSE: .5; 3 SOLUTION RESPIRATORY (INHALATION) at 08:18

## 2025-04-26 RX ADMIN — Medication 10 ML: at 09:43

## 2025-04-26 RX ADMIN — BUDESONIDE 500 MCG: 0.5 INHALANT RESPIRATORY (INHALATION) at 08:18

## 2025-04-26 RX ADMIN — INSULIN LISPRO 4 UNITS: 100 INJECTION, SOLUTION INTRAVENOUS; SUBCUTANEOUS at 17:21

## 2025-04-26 RX ADMIN — GUAIFENESIN 600 MG: 600 TABLET ORAL at 21:16

## 2025-04-26 RX ADMIN — IPRATROPIUM BROMIDE AND ALBUTEROL SULFATE 1 DOSE: .5; 3 SOLUTION RESPIRATORY (INHALATION) at 03:30

## 2025-04-26 ASSESSMENT — PAIN SCALES - GENERAL
PAINLEVEL_OUTOF10: 0

## 2025-04-27 LAB
ANION GAP SERPL CALCULATED.3IONS-SCNC: 8 MMOL/L (ref 3–16)
BACTERIA BLD CULT ORG #2: NORMAL
BACTERIA BLD CULT: NORMAL
BASOPHILS # BLD: 0.1 K/UL (ref 0–0.2)
BASOPHILS NFR BLD: 0.5 %
BUN SERPL-MCNC: 27 MG/DL (ref 7–20)
CALCIUM SERPL-MCNC: 9.1 MG/DL (ref 8.3–10.6)
CHLORIDE SERPL-SCNC: 96 MMOL/L (ref 99–110)
CO2 SERPL-SCNC: 33 MMOL/L (ref 21–32)
CREAT SERPL-MCNC: 0.6 MG/DL (ref 0.6–1.1)
DEPRECATED RDW RBC AUTO: 18 % (ref 12.4–15.4)
EOSINOPHIL # BLD: 0 K/UL (ref 0–0.6)
EOSINOPHIL NFR BLD: 0.3 %
EST. AVERAGE GLUCOSE BLD GHB EST-MCNC: 205.9 MG/DL
GFR SERPLBLD CREATININE-BSD FMLA CKD-EPI: >90 ML/MIN/{1.73_M2}
GLUCOSE BLD-MCNC: 156 MG/DL (ref 70–99)
GLUCOSE BLD-MCNC: 222 MG/DL (ref 70–99)
GLUCOSE BLD-MCNC: 297 MG/DL (ref 70–99)
GLUCOSE BLD-MCNC: 398 MG/DL (ref 70–99)
GLUCOSE SERPL-MCNC: 215 MG/DL (ref 70–99)
HBA1C MFR BLD: 8.8 %
HCT VFR BLD AUTO: 44.7 % (ref 36–48)
HGB BLD-MCNC: 14.5 G/DL (ref 12–16)
LYMPHOCYTES # BLD: 1.5 K/UL (ref 1–5.1)
LYMPHOCYTES NFR BLD: 10.4 %
MCH RBC QN AUTO: 26.6 PG (ref 26–34)
MCHC RBC AUTO-ENTMCNC: 32.5 G/DL (ref 31–36)
MCV RBC AUTO: 81.9 FL (ref 80–100)
MONOCYTES # BLD: 0.7 K/UL (ref 0–1.3)
MONOCYTES NFR BLD: 4.9 %
MRSA SPEC QL CULT: NORMAL
NEUTROPHILS # BLD: 11.7 K/UL (ref 1.7–7.7)
NEUTROPHILS NFR BLD: 83.9 %
PERFORMED ON: ABNORMAL
PLATELET # BLD AUTO: 417 K/UL (ref 135–450)
PMV BLD AUTO: 8.7 FL (ref 5–10.5)
POTASSIUM SERPL-SCNC: 4.7 MMOL/L (ref 3.5–5.1)
RBC # BLD AUTO: 5.46 M/UL (ref 4–5.2)
SODIUM SERPL-SCNC: 137 MMOL/L (ref 136–145)
WBC # BLD AUTO: 14 K/UL (ref 4–11)

## 2025-04-27 PROCEDURE — 94761 N-INVAS EAR/PLS OXIMETRY MLT: CPT

## 2025-04-27 PROCEDURE — 2580000003 HC RX 258: Performed by: INTERNAL MEDICINE

## 2025-04-27 PROCEDURE — 6370000000 HC RX 637 (ALT 250 FOR IP): Performed by: INTERNAL MEDICINE

## 2025-04-27 PROCEDURE — 85025 COMPLETE CBC W/AUTO DIFF WBC: CPT

## 2025-04-27 PROCEDURE — 2500000003 HC RX 250 WO HCPCS: Performed by: INTERNAL MEDICINE

## 2025-04-27 PROCEDURE — 6370000000 HC RX 637 (ALT 250 FOR IP): Performed by: HOSPITALIST

## 2025-04-27 PROCEDURE — 6360000002 HC RX W HCPCS: Performed by: INTERNAL MEDICINE

## 2025-04-27 PROCEDURE — 94640 AIRWAY INHALATION TREATMENT: CPT

## 2025-04-27 PROCEDURE — 2700000000 HC OXYGEN THERAPY PER DAY

## 2025-04-27 PROCEDURE — 36415 COLL VENOUS BLD VENIPUNCTURE: CPT

## 2025-04-27 PROCEDURE — 2000000000 HC ICU R&B

## 2025-04-27 PROCEDURE — 99291 CRITICAL CARE FIRST HOUR: CPT | Performed by: INTERNAL MEDICINE

## 2025-04-27 PROCEDURE — 94660 CPAP INITIATION&MGMT: CPT

## 2025-04-27 PROCEDURE — 80048 BASIC METABOLIC PNL TOTAL CA: CPT

## 2025-04-27 RX ADMIN — WATER 1000 MG: 1 INJECTION INTRAMUSCULAR; INTRAVENOUS; SUBCUTANEOUS at 15:07

## 2025-04-27 RX ADMIN — BUDESONIDE 500 MCG: 0.5 INHALANT RESPIRATORY (INHALATION) at 19:19

## 2025-04-27 RX ADMIN — WATER 40 MG: 1 INJECTION INTRAMUSCULAR; INTRAVENOUS; SUBCUTANEOUS at 08:38

## 2025-04-27 RX ADMIN — GUAIFENESIN 600 MG: 600 TABLET ORAL at 08:38

## 2025-04-27 RX ADMIN — IPRATROPIUM BROMIDE AND ALBUTEROL SULFATE 1 DOSE: .5; 3 SOLUTION RESPIRATORY (INHALATION) at 16:06

## 2025-04-27 RX ADMIN — INSULIN LISPRO 2 UNITS: 100 INJECTION, SOLUTION INTRAVENOUS; SUBCUTANEOUS at 11:12

## 2025-04-27 RX ADMIN — INSULIN LISPRO 12 UNITS: 100 INJECTION, SOLUTION INTRAVENOUS; SUBCUTANEOUS at 17:26

## 2025-04-27 RX ADMIN — LEVOTHYROXINE SODIUM 112 MCG: 0.11 TABLET ORAL at 05:30

## 2025-04-27 RX ADMIN — SODIUM CHLORIDE, PRESERVATIVE FREE 0.5 MG: 5 INJECTION INTRAVENOUS at 05:30

## 2025-04-27 RX ADMIN — Medication 10 ML: at 20:30

## 2025-04-27 RX ADMIN — IPRATROPIUM BROMIDE AND ALBUTEROL SULFATE 1 DOSE: .5; 3 SOLUTION RESPIRATORY (INHALATION) at 08:24

## 2025-04-27 RX ADMIN — GUAIFENESIN AND DEXTROMETHORPHAN 5 ML: 100; 10 SYRUP ORAL at 15:18

## 2025-04-27 RX ADMIN — INSULIN LISPRO 12 UNITS: 100 INJECTION, SOLUTION INTRAVENOUS; SUBCUTANEOUS at 12:49

## 2025-04-27 RX ADMIN — Medication 10 ML: at 08:38

## 2025-04-27 RX ADMIN — GUAIFENESIN AND DEXTROMETHORPHAN 5 ML: 100; 10 SYRUP ORAL at 21:39

## 2025-04-27 RX ADMIN — INSULIN LISPRO 4 UNITS: 100 INJECTION, SOLUTION INTRAVENOUS; SUBCUTANEOUS at 20:28

## 2025-04-27 RX ADMIN — ARFORMOTEROL TARTRATE 15 MCG: 15 SOLUTION RESPIRATORY (INHALATION) at 08:25

## 2025-04-27 RX ADMIN — GUAIFENESIN 600 MG: 600 TABLET ORAL at 20:33

## 2025-04-27 RX ADMIN — INSULIN GLARGINE 15 UNITS: 100 INJECTION, SOLUTION SUBCUTANEOUS at 08:38

## 2025-04-27 RX ADMIN — IPRATROPIUM BROMIDE AND ALBUTEROL SULFATE 1 DOSE: .5; 3 SOLUTION RESPIRATORY (INHALATION) at 12:28

## 2025-04-27 RX ADMIN — SODIUM CHLORIDE, PRESERVATIVE FREE 0.5 MG: 5 INJECTION INTRAVENOUS at 23:09

## 2025-04-27 RX ADMIN — ENOXAPARIN SODIUM 40 MG: 100 INJECTION SUBCUTANEOUS at 20:28

## 2025-04-27 RX ADMIN — BUDESONIDE 500 MCG: 0.5 INHALANT RESPIRATORY (INHALATION) at 08:25

## 2025-04-27 RX ADMIN — ENOXAPARIN SODIUM 40 MG: 100 INJECTION SUBCUTANEOUS at 08:38

## 2025-04-27 RX ADMIN — ARFORMOTEROL TARTRATE 15 MCG: 15 SOLUTION RESPIRATORY (INHALATION) at 19:19

## 2025-04-27 RX ADMIN — INSULIN GLARGINE 15 UNITS: 100 INJECTION, SOLUTION SUBCUTANEOUS at 20:29

## 2025-04-27 RX ADMIN — FUROSEMIDE 40 MG: 10 INJECTION, SOLUTION INTRAMUSCULAR; INTRAVENOUS at 08:38

## 2025-04-27 RX ADMIN — INSULIN LISPRO 8 UNITS: 100 INJECTION, SOLUTION INTRAVENOUS; SUBCUTANEOUS at 17:27

## 2025-04-27 RX ADMIN — IPRATROPIUM BROMIDE AND ALBUTEROL SULFATE 1 DOSE: .5; 3 SOLUTION RESPIRATORY (INHALATION) at 19:19

## 2025-04-27 RX ADMIN — INSULIN LISPRO 12 UNITS: 100 INJECTION, SOLUTION INTRAVENOUS; SUBCUTANEOUS at 08:43

## 2025-04-27 ASSESSMENT — PAIN SCALES - GENERAL
PAINLEVEL_OUTOF10: 0
PAINLEVEL_OUTOF10: 0

## 2025-04-28 ENCOUNTER — APPOINTMENT (OUTPATIENT)
Age: 60
DRG: 193 | End: 2025-04-28
Attending: HOSPITALIST
Payer: OTHER GOVERNMENT

## 2025-04-28 ENCOUNTER — APPOINTMENT (OUTPATIENT)
Dept: GENERAL RADIOLOGY | Age: 60
DRG: 193 | End: 2025-04-28
Payer: OTHER GOVERNMENT

## 2025-04-28 LAB
ANION GAP SERPL CALCULATED.3IONS-SCNC: 9 MMOL/L (ref 3–16)
BASOPHILS # BLD: 0.1 K/UL (ref 0–0.2)
BASOPHILS NFR BLD: 0.5 %
BUN SERPL-MCNC: 24 MG/DL (ref 7–20)
CALCIUM SERPL-MCNC: 9.3 MG/DL (ref 8.3–10.6)
CHLORIDE SERPL-SCNC: 96 MMOL/L (ref 99–110)
CO2 SERPL-SCNC: 31 MMOL/L (ref 21–32)
CREAT SERPL-MCNC: 0.6 MG/DL (ref 0.6–1.1)
DEPRECATED RDW RBC AUTO: 18 % (ref 12.4–15.4)
ECHO AO ASC DIAM: 2.9 CM
ECHO AO ASCENDING AORTA INDEX: 1.18 CM/M2
ECHO AV AREA PEAK VELOCITY: 1.9 CM2
ECHO AV AREA/BSA PEAK VELOCITY: 0.8 CM2/M2
ECHO AV PEAK GRADIENT: 12 MMHG
ECHO AV PEAK VELOCITY: 1.7 M/S
ECHO AV VELOCITY RATIO: 0.65
ECHO BSA: 2.6 M2
ECHO IVC INSP: 0.4 CM
ECHO IVC PROX: 1.8 CM
ECHO LA AREA 2C: 23.9 CM2
ECHO LA AREA 4C: 21.6 CM2
ECHO LA MAJOR AXIS: 5.9 CM
ECHO LA MINOR AXIS: 5.8 CM
ECHO LA VOL BP: 71 ML (ref 22–52)
ECHO LA VOL MOD A2C: 79 ML (ref 22–52)
ECHO LA VOL MOD A4C: 63 ML (ref 22–52)
ECHO LA VOL/BSA BIPLANE: 29 ML/M2 (ref 16–34)
ECHO LA VOLUME INDEX MOD A2C: 32 ML/M2 (ref 16–34)
ECHO LA VOLUME INDEX MOD A4C: 26 ML/M2 (ref 16–34)
ECHO LV E' LATERAL VELOCITY: 10 CM/S
ECHO LV E' SEPTAL VELOCITY: 7.83 CM/S
ECHO LV EDV A2C: 129 ML
ECHO LV EDV A4C: 114 ML
ECHO LV EDV INDEX A4C: 46 ML/M2
ECHO LV EDV NDEX A2C: 52 ML/M2
ECHO LV EF PHYSICIAN: 70 %
ECHO LV EJECTION FRACTION A2C: 74 %
ECHO LV EJECTION FRACTION A4C: 72 %
ECHO LV EJECTION FRACTION BIPLANE: 73 % (ref 55–100)
ECHO LV ESV A2C: 33 ML
ECHO LV ESV A4C: 32 ML
ECHO LV ESV INDEX A2C: 13 ML/M2
ECHO LV ESV INDEX A4C: 13 ML/M2
ECHO LVOT AREA: 2.8 CM2
ECHO LVOT DIAM: 1.9 CM
ECHO LVOT MEAN GRADIENT: 2 MMHG
ECHO LVOT PEAK GRADIENT: 5 MMHG
ECHO LVOT PEAK VELOCITY: 1.1 M/S
ECHO LVOT STROKE VOLUME INDEX: 25.8 ML/M2
ECHO LVOT SV: 63.5 ML
ECHO LVOT VTI: 22.4 CM
ECHO MV A VELOCITY: 0.62 M/S
ECHO MV E VELOCITY: 0.7 M/S
ECHO MV E/A RATIO: 1.13
ECHO MV E/E' LATERAL: 7
ECHO MV E/E' RATIO (AVERAGED): 7.97
ECHO MV E/E' SEPTAL: 8.94
ECHO PV MAX VELOCITY: 0.8 M/S
ECHO PV PEAK GRADIENT: 3 MMHG
ECHO RA AREA 4C: 14 CM2
ECHO RA END SYSTOLIC VOLUME APICAL 4 CHAMBER INDEX BSA: 14 ML/M2
ECHO RA VOLUME: 34 ML
ECHO RV BASAL DIMENSION: 4 CM
ECHO RV FREE WALL PEAK S': 12 CM/S
ECHO RV LONGITUDINAL DIMENSION: 8.5 CM
ECHO RV MID DIMENSION: 2.5 CM
ECHO RV TAPSE: 1.8 CM (ref 1.7–?)
EOSINOPHIL # BLD: 0.1 K/UL (ref 0–0.6)
EOSINOPHIL NFR BLD: 0.6 %
GFR SERPLBLD CREATININE-BSD FMLA CKD-EPI: >90 ML/MIN/{1.73_M2}
GLUCOSE BLD-MCNC: 152 MG/DL (ref 70–99)
GLUCOSE BLD-MCNC: 332 MG/DL (ref 70–99)
GLUCOSE BLD-MCNC: 353 MG/DL (ref 70–99)
GLUCOSE BLD-MCNC: 379 MG/DL (ref 70–99)
GLUCOSE SERPL-MCNC: 176 MG/DL (ref 70–99)
HCT VFR BLD AUTO: 49.3 % (ref 36–48)
HGB BLD-MCNC: 15.9 G/DL (ref 12–16)
LYMPHOCYTES # BLD: 1.7 K/UL (ref 1–5.1)
LYMPHOCYTES NFR BLD: 11 %
MCH RBC QN AUTO: 26.3 PG (ref 26–34)
MCHC RBC AUTO-ENTMCNC: 32.3 G/DL (ref 31–36)
MCV RBC AUTO: 81.3 FL (ref 80–100)
MONOCYTES # BLD: 0.8 K/UL (ref 0–1.3)
MONOCYTES NFR BLD: 4.9 %
NEUTROPHILS # BLD: 12.7 K/UL (ref 1.7–7.7)
NEUTROPHILS NFR BLD: 83 %
PERFORMED ON: ABNORMAL
PLATELET # BLD AUTO: 447 K/UL (ref 135–450)
PMV BLD AUTO: 8.7 FL (ref 5–10.5)
POTASSIUM SERPL-SCNC: 4.9 MMOL/L (ref 3.5–5.1)
RBC # BLD AUTO: 6.06 M/UL (ref 4–5.2)
SODIUM SERPL-SCNC: 136 MMOL/L (ref 136–145)
WBC # BLD AUTO: 15.3 K/UL (ref 4–11)

## 2025-04-28 PROCEDURE — 6370000000 HC RX 637 (ALT 250 FOR IP): Performed by: HOSPITALIST

## 2025-04-28 PROCEDURE — 2500000003 HC RX 250 WO HCPCS: Performed by: INTERNAL MEDICINE

## 2025-04-28 PROCEDURE — 6360000002 HC RX W HCPCS: Performed by: INTERNAL MEDICINE

## 2025-04-28 PROCEDURE — 80048 BASIC METABOLIC PNL TOTAL CA: CPT

## 2025-04-28 PROCEDURE — 93306 TTE W/DOPPLER COMPLETE: CPT | Performed by: INTERNAL MEDICINE

## 2025-04-28 PROCEDURE — 36415 COLL VENOUS BLD VENIPUNCTURE: CPT

## 2025-04-28 PROCEDURE — 6370000000 HC RX 637 (ALT 250 FOR IP): Performed by: INTERNAL MEDICINE

## 2025-04-28 PROCEDURE — 51702 INSERT TEMP BLADDER CATH: CPT

## 2025-04-28 PROCEDURE — 6360000004 HC RX CONTRAST MEDICATION: Performed by: HOSPITALIST

## 2025-04-28 PROCEDURE — 2000000000 HC ICU R&B

## 2025-04-28 PROCEDURE — 94761 N-INVAS EAR/PLS OXIMETRY MLT: CPT

## 2025-04-28 PROCEDURE — 85025 COMPLETE CBC W/AUTO DIFF WBC: CPT

## 2025-04-28 PROCEDURE — 2700000000 HC OXYGEN THERAPY PER DAY

## 2025-04-28 PROCEDURE — 94660 CPAP INITIATION&MGMT: CPT

## 2025-04-28 PROCEDURE — 99291 CRITICAL CARE FIRST HOUR: CPT | Performed by: INTERNAL MEDICINE

## 2025-04-28 PROCEDURE — 71045 X-RAY EXAM CHEST 1 VIEW: CPT

## 2025-04-28 PROCEDURE — C8929 TTE W OR WO FOL WCON,DOPPLER: HCPCS

## 2025-04-28 PROCEDURE — 94640 AIRWAY INHALATION TREATMENT: CPT

## 2025-04-28 PROCEDURE — 2580000003 HC RX 258: Performed by: INTERNAL MEDICINE

## 2025-04-28 RX ORDER — PANTOPRAZOLE SODIUM 40 MG/1
40 TABLET, DELAYED RELEASE ORAL
Status: DISCONTINUED | OUTPATIENT
Start: 2025-04-29 | End: 2025-05-06 | Stop reason: ALTCHOICE

## 2025-04-28 RX ORDER — PREDNISONE 20 MG/1
20 TABLET ORAL DAILY
Status: COMPLETED | OUTPATIENT
Start: 2025-04-30 | End: 2025-04-30

## 2025-04-28 RX ORDER — PREDNISONE 20 MG/1
40 TABLET ORAL DAILY
Status: COMPLETED | OUTPATIENT
Start: 2025-04-28 | End: 2025-04-28

## 2025-04-28 RX ORDER — PREDNISONE 5 MG/1
5 TABLET ORAL DAILY
Status: COMPLETED | OUTPATIENT
Start: 2025-05-02 | End: 2025-05-02

## 2025-04-28 RX ORDER — PREDNISONE 10 MG/1
10 TABLET ORAL DAILY
Status: COMPLETED | OUTPATIENT
Start: 2025-05-01 | End: 2025-05-01

## 2025-04-28 RX ADMIN — BUDESONIDE 500 MCG: 0.5 INHALANT RESPIRATORY (INHALATION) at 19:35

## 2025-04-28 RX ADMIN — INSULIN LISPRO 6 UNITS: 100 INJECTION, SOLUTION INTRAVENOUS; SUBCUTANEOUS at 17:36

## 2025-04-28 RX ADMIN — GUAIFENESIN AND DEXTROMETHORPHAN 5 ML: 100; 10 SYRUP ORAL at 21:01

## 2025-04-28 RX ADMIN — LEVOTHYROXINE SODIUM 112 MCG: 0.11 TABLET ORAL at 08:14

## 2025-04-28 RX ADMIN — INSULIN LISPRO 8 UNITS: 100 INJECTION, SOLUTION INTRAVENOUS; SUBCUTANEOUS at 12:20

## 2025-04-28 RX ADMIN — INSULIN LISPRO 8 UNITS: 100 INJECTION, SOLUTION INTRAVENOUS; SUBCUTANEOUS at 21:01

## 2025-04-28 RX ADMIN — INSULIN LISPRO 12 UNITS: 100 INJECTION, SOLUTION INTRAVENOUS; SUBCUTANEOUS at 13:10

## 2025-04-28 RX ADMIN — Medication 10 ML: at 21:02

## 2025-04-28 RX ADMIN — IPRATROPIUM BROMIDE AND ALBUTEROL SULFATE 1 DOSE: .5; 3 SOLUTION RESPIRATORY (INHALATION) at 07:54

## 2025-04-28 RX ADMIN — INSULIN GLARGINE 15 UNITS: 100 INJECTION, SOLUTION SUBCUTANEOUS at 21:01

## 2025-04-28 RX ADMIN — FUROSEMIDE 40 MG: 10 INJECTION, SOLUTION INTRAMUSCULAR; INTRAVENOUS at 08:12

## 2025-04-28 RX ADMIN — WATER 40 MG: 1 INJECTION INTRAMUSCULAR; INTRAVENOUS; SUBCUTANEOUS at 08:12

## 2025-04-28 RX ADMIN — SULFUR HEXAFLUORIDE 2 ML: 60.7; .19; .19 INJECTION, POWDER, LYOPHILIZED, FOR SUSPENSION INTRAVENOUS; INTRAVESICAL at 09:10

## 2025-04-28 RX ADMIN — IPRATROPIUM BROMIDE AND ALBUTEROL SULFATE 1 DOSE: .5; 3 SOLUTION RESPIRATORY (INHALATION) at 15:55

## 2025-04-28 RX ADMIN — SODIUM CHLORIDE, PRESERVATIVE FREE 0.5 MG: 5 INJECTION INTRAVENOUS at 22:13

## 2025-04-28 RX ADMIN — ARFORMOTEROL TARTRATE 15 MCG: 15 SOLUTION RESPIRATORY (INHALATION) at 19:35

## 2025-04-28 RX ADMIN — INSULIN LISPRO 12 UNITS: 100 INJECTION, SOLUTION INTRAVENOUS; SUBCUTANEOUS at 08:14

## 2025-04-28 RX ADMIN — INSULIN GLARGINE 15 UNITS: 100 INJECTION, SOLUTION SUBCUTANEOUS at 08:14

## 2025-04-28 RX ADMIN — Medication 10 ML: at 09:00

## 2025-04-28 RX ADMIN — ARFORMOTEROL TARTRATE 15 MCG: 15 SOLUTION RESPIRATORY (INHALATION) at 07:55

## 2025-04-28 RX ADMIN — GUAIFENESIN 600 MG: 600 TABLET ORAL at 21:01

## 2025-04-28 RX ADMIN — IPRATROPIUM BROMIDE AND ALBUTEROL SULFATE 1 DOSE: .5; 3 SOLUTION RESPIRATORY (INHALATION) at 19:35

## 2025-04-28 RX ADMIN — WATER 1000 MG: 1 INJECTION INTRAMUSCULAR; INTRAVENOUS; SUBCUTANEOUS at 15:46

## 2025-04-28 RX ADMIN — ENOXAPARIN SODIUM 40 MG: 100 INJECTION SUBCUTANEOUS at 21:01

## 2025-04-28 RX ADMIN — BUDESONIDE 500 MCG: 0.5 INHALANT RESPIRATORY (INHALATION) at 07:54

## 2025-04-28 RX ADMIN — ENOXAPARIN SODIUM 40 MG: 100 INJECTION SUBCUTANEOUS at 08:12

## 2025-04-28 RX ADMIN — INSULIN LISPRO 12 UNITS: 100 INJECTION, SOLUTION INTRAVENOUS; SUBCUTANEOUS at 17:52

## 2025-04-28 RX ADMIN — PREDNISONE 40 MG: 20 TABLET ORAL at 11:23

## 2025-04-28 RX ADMIN — IPRATROPIUM BROMIDE AND ALBUTEROL SULFATE 1 DOSE: .5; 3 SOLUTION RESPIRATORY (INHALATION) at 12:36

## 2025-04-28 ASSESSMENT — PAIN SCALES - GENERAL: PAINLEVEL_OUTOF10: 0

## 2025-04-29 LAB
ANION GAP SERPL CALCULATED.3IONS-SCNC: 9 MMOL/L (ref 3–16)
BASOPHILS # BLD: 0 K/UL (ref 0–0.2)
BASOPHILS NFR BLD: 0.2 %
BUN SERPL-MCNC: 24 MG/DL (ref 7–20)
CALCIUM SERPL-MCNC: 9 MG/DL (ref 8.3–10.6)
CHLORIDE SERPL-SCNC: 96 MMOL/L (ref 99–110)
CO2 SERPL-SCNC: 30 MMOL/L (ref 21–32)
CREAT SERPL-MCNC: 0.7 MG/DL (ref 0.6–1.1)
DEPRECATED RDW RBC AUTO: 18.3 % (ref 12.4–15.4)
EOSINOPHIL # BLD: 0.1 K/UL (ref 0–0.6)
EOSINOPHIL NFR BLD: 0.3 %
GFR SERPLBLD CREATININE-BSD FMLA CKD-EPI: >90 ML/MIN/{1.73_M2}
GLUCOSE BLD-MCNC: 261 MG/DL (ref 70–99)
GLUCOSE BLD-MCNC: 311 MG/DL (ref 70–99)
GLUCOSE BLD-MCNC: 313 MG/DL (ref 70–99)
GLUCOSE BLD-MCNC: 398 MG/DL (ref 70–99)
GLUCOSE SERPL-MCNC: 305 MG/DL (ref 70–99)
HCT VFR BLD AUTO: 50.1 % (ref 36–48)
HGB BLD-MCNC: 16 G/DL (ref 12–16)
LYMPHOCYTES # BLD: 1.4 K/UL (ref 1–5.1)
LYMPHOCYTES NFR BLD: 8.1 %
MCH RBC QN AUTO: 26.7 PG (ref 26–34)
MCHC RBC AUTO-ENTMCNC: 32 G/DL (ref 31–36)
MCV RBC AUTO: 83.6 FL (ref 80–100)
MONOCYTES # BLD: 0.7 K/UL (ref 0–1.3)
MONOCYTES NFR BLD: 4.1 %
NEUTROPHILS # BLD: 14.8 K/UL (ref 1.7–7.7)
NEUTROPHILS NFR BLD: 87.3 %
PERFORMED ON: ABNORMAL
PLATELET # BLD AUTO: 420 K/UL (ref 135–450)
PMV BLD AUTO: 9 FL (ref 5–10.5)
POTASSIUM SERPL-SCNC: 4.6 MMOL/L (ref 3.5–5.1)
RBC # BLD AUTO: 5.99 M/UL (ref 4–5.2)
SODIUM SERPL-SCNC: 135 MMOL/L (ref 136–145)
WBC # BLD AUTO: 17 K/UL (ref 4–11)

## 2025-04-29 PROCEDURE — 82088 ASSAY OF ALDOSTERONE: CPT

## 2025-04-29 PROCEDURE — 97530 THERAPEUTIC ACTIVITIES: CPT

## 2025-04-29 PROCEDURE — 6360000002 HC RX W HCPCS: Performed by: INTERNAL MEDICINE

## 2025-04-29 PROCEDURE — 2580000003 HC RX 258: Performed by: INTERNAL MEDICINE

## 2025-04-29 PROCEDURE — 97535 SELF CARE MNGMENT TRAINING: CPT

## 2025-04-29 PROCEDURE — 6370000000 HC RX 637 (ALT 250 FOR IP): Performed by: STUDENT IN AN ORGANIZED HEALTH CARE EDUCATION/TRAINING PROGRAM

## 2025-04-29 PROCEDURE — 84244 ASSAY OF RENIN: CPT

## 2025-04-29 PROCEDURE — 85025 COMPLETE CBC W/AUTO DIFF WBC: CPT

## 2025-04-29 PROCEDURE — 6370000000 HC RX 637 (ALT 250 FOR IP): Performed by: INTERNAL MEDICINE

## 2025-04-29 PROCEDURE — 36415 COLL VENOUS BLD VENIPUNCTURE: CPT

## 2025-04-29 PROCEDURE — 97162 PT EVAL MOD COMPLEX 30 MIN: CPT

## 2025-04-29 PROCEDURE — 2000000000 HC ICU R&B

## 2025-04-29 PROCEDURE — 2500000003 HC RX 250 WO HCPCS: Performed by: INTERNAL MEDICINE

## 2025-04-29 PROCEDURE — 94640 AIRWAY INHALATION TREATMENT: CPT

## 2025-04-29 PROCEDURE — 2700000000 HC OXYGEN THERAPY PER DAY

## 2025-04-29 PROCEDURE — 94761 N-INVAS EAR/PLS OXIMETRY MLT: CPT

## 2025-04-29 PROCEDURE — 97110 THERAPEUTIC EXERCISES: CPT

## 2025-04-29 PROCEDURE — 6370000000 HC RX 637 (ALT 250 FOR IP): Performed by: HOSPITALIST

## 2025-04-29 PROCEDURE — 97166 OT EVAL MOD COMPLEX 45 MIN: CPT

## 2025-04-29 PROCEDURE — 80048 BASIC METABOLIC PNL TOTAL CA: CPT

## 2025-04-29 PROCEDURE — 99291 CRITICAL CARE FIRST HOUR: CPT | Performed by: INTERNAL MEDICINE

## 2025-04-29 RX ORDER — INSULIN LISPRO 100 [IU]/ML
0-16 INJECTION, SOLUTION INTRAVENOUS; SUBCUTANEOUS
Status: DISCONTINUED | OUTPATIENT
Start: 2025-04-29 | End: 2025-05-07 | Stop reason: HOSPADM

## 2025-04-29 RX ORDER — IPRATROPIUM BROMIDE AND ALBUTEROL SULFATE 2.5; .5 MG/3ML; MG/3ML
1 SOLUTION RESPIRATORY (INHALATION)
Status: DISCONTINUED | OUTPATIENT
Start: 2025-04-29 | End: 2025-05-07

## 2025-04-29 RX ORDER — INSULIN LISPRO 100 [IU]/ML
14 INJECTION, SOLUTION INTRAVENOUS; SUBCUTANEOUS
Status: DISCONTINUED | OUTPATIENT
Start: 2025-04-29 | End: 2025-04-30

## 2025-04-29 RX ORDER — INSULIN GLARGINE 100 [IU]/ML
18 INJECTION, SOLUTION SUBCUTANEOUS 2 TIMES DAILY
Status: DISCONTINUED | OUTPATIENT
Start: 2025-04-29 | End: 2025-04-30

## 2025-04-29 RX ADMIN — ENOXAPARIN SODIUM 40 MG: 100 INJECTION SUBCUTANEOUS at 20:06

## 2025-04-29 RX ADMIN — HYDROXYZINE PAMOATE 25 MG: 25 CAPSULE ORAL at 20:24

## 2025-04-29 RX ADMIN — Medication 10 ML: at 20:12

## 2025-04-29 RX ADMIN — Medication 10 ML: at 08:24

## 2025-04-29 RX ADMIN — ARFORMOTEROL TARTRATE 15 MCG: 15 SOLUTION RESPIRATORY (INHALATION) at 08:04

## 2025-04-29 RX ADMIN — GUAIFENESIN AND DEXTROMETHORPHAN 5 ML: 100; 10 SYRUP ORAL at 20:20

## 2025-04-29 RX ADMIN — INSULIN LISPRO 12 UNITS: 100 INJECTION, SOLUTION INTRAVENOUS; SUBCUTANEOUS at 11:33

## 2025-04-29 RX ADMIN — WATER 1000 MG: 1 INJECTION INTRAMUSCULAR; INTRAVENOUS; SUBCUTANEOUS at 13:54

## 2025-04-29 RX ADMIN — INSULIN LISPRO 12 UNITS: 100 INJECTION, SOLUTION INTRAVENOUS; SUBCUTANEOUS at 08:27

## 2025-04-29 RX ADMIN — INSULIN GLARGINE 15 UNITS: 100 INJECTION, SOLUTION SUBCUTANEOUS at 08:20

## 2025-04-29 RX ADMIN — ARFORMOTEROL TARTRATE 15 MCG: 15 SOLUTION RESPIRATORY (INHALATION) at 19:58

## 2025-04-29 RX ADMIN — IPRATROPIUM BROMIDE AND ALBUTEROL SULFATE 1 DOSE: .5; 3 SOLUTION RESPIRATORY (INHALATION) at 19:58

## 2025-04-29 RX ADMIN — BUDESONIDE 500 MCG: 0.5 INHALANT RESPIRATORY (INHALATION) at 08:04

## 2025-04-29 RX ADMIN — GUAIFENESIN AND DEXTROMETHORPHAN 5 ML: 100; 10 SYRUP ORAL at 04:21

## 2025-04-29 RX ADMIN — IPRATROPIUM BROMIDE AND ALBUTEROL SULFATE 1 DOSE: .5; 3 SOLUTION RESPIRATORY (INHALATION) at 11:57

## 2025-04-29 RX ADMIN — INSULIN LISPRO 14 UNITS: 100 INJECTION, SOLUTION INTRAVENOUS; SUBCUTANEOUS at 16:53

## 2025-04-29 RX ADMIN — FUROSEMIDE 40 MG: 10 INJECTION, SOLUTION INTRAMUSCULAR; INTRAVENOUS at 08:19

## 2025-04-29 RX ADMIN — GUAIFENESIN 600 MG: 600 TABLET ORAL at 20:06

## 2025-04-29 RX ADMIN — INSULIN GLARGINE 18 UNITS: 100 INJECTION, SOLUTION SUBCUTANEOUS at 20:06

## 2025-04-29 RX ADMIN — BUDESONIDE 500 MCG: 0.5 INHALANT RESPIRATORY (INHALATION) at 19:58

## 2025-04-29 RX ADMIN — PREDNISONE 30 MG: 10 TABLET ORAL at 08:27

## 2025-04-29 RX ADMIN — PANTOPRAZOLE SODIUM 40 MG: 40 TABLET, DELAYED RELEASE ORAL at 08:23

## 2025-04-29 RX ADMIN — INSULIN LISPRO 4 UNITS: 100 INJECTION, SOLUTION INTRAVENOUS; SUBCUTANEOUS at 08:20

## 2025-04-29 RX ADMIN — INSULIN LISPRO 12 UNITS: 100 INJECTION, SOLUTION INTRAVENOUS; SUBCUTANEOUS at 20:14

## 2025-04-29 RX ADMIN — LEVOTHYROXINE SODIUM 112 MCG: 0.11 TABLET ORAL at 08:20

## 2025-04-29 RX ADMIN — ENOXAPARIN SODIUM 40 MG: 100 INJECTION SUBCUTANEOUS at 08:21

## 2025-04-29 RX ADMIN — GUAIFENESIN AND DEXTROMETHORPHAN 5 ML: 100; 10 SYRUP ORAL at 11:29

## 2025-04-29 RX ADMIN — INSULIN LISPRO 16 UNITS: 100 INJECTION, SOLUTION INTRAVENOUS; SUBCUTANEOUS at 16:53

## 2025-04-29 RX ADMIN — IPRATROPIUM BROMIDE AND ALBUTEROL SULFATE 1 DOSE: .5; 3 SOLUTION RESPIRATORY (INHALATION) at 08:04

## 2025-04-29 RX ADMIN — GUAIFENESIN 600 MG: 600 TABLET ORAL at 08:19

## 2025-04-29 RX ADMIN — SODIUM CHLORIDE, PRESERVATIVE FREE 0.5 MG: 5 INJECTION INTRAVENOUS at 23:02

## 2025-04-29 ASSESSMENT — PAIN SCALES - GENERAL
PAINLEVEL_OUTOF10: 0

## 2025-04-30 LAB
ANION GAP SERPL CALCULATED.3IONS-SCNC: 9 MMOL/L (ref 3–16)
BASOPHILS # BLD: 0.1 K/UL (ref 0–0.2)
BASOPHILS NFR BLD: 0.5 %
BUN SERPL-MCNC: 24 MG/DL (ref 7–20)
CALCIUM SERPL-MCNC: 9 MG/DL (ref 8.3–10.6)
CHLORIDE SERPL-SCNC: 96 MMOL/L (ref 99–110)
CO2 SERPL-SCNC: 30 MMOL/L (ref 21–32)
CREAT SERPL-MCNC: 0.6 MG/DL (ref 0.6–1.1)
DEPRECATED RDW RBC AUTO: 18 % (ref 12.4–15.4)
EOSINOPHIL # BLD: 0.1 K/UL (ref 0–0.6)
EOSINOPHIL NFR BLD: 0.8 %
GFR SERPLBLD CREATININE-BSD FMLA CKD-EPI: >90 ML/MIN/{1.73_M2}
GLUCOSE BLD-MCNC: 128 MG/DL (ref 70–99)
GLUCOSE BLD-MCNC: 152 MG/DL (ref 70–99)
GLUCOSE BLD-MCNC: 275 MG/DL (ref 70–99)
GLUCOSE BLD-MCNC: 296 MG/DL (ref 70–99)
GLUCOSE BLD-MCNC: 302 MG/DL (ref 70–99)
GLUCOSE SERPL-MCNC: 162 MG/DL (ref 70–99)
HCT VFR BLD AUTO: 51.2 % (ref 36–48)
HGB BLD-MCNC: 16.5 G/DL (ref 12–16)
LYMPHOCYTES # BLD: 2 K/UL (ref 1–5.1)
LYMPHOCYTES NFR BLD: 13 %
MCH RBC QN AUTO: 26.4 PG (ref 26–34)
MCHC RBC AUTO-ENTMCNC: 32.3 G/DL (ref 31–36)
MCV RBC AUTO: 81.8 FL (ref 80–100)
MONOCYTES # BLD: 0.9 K/UL (ref 0–1.3)
MONOCYTES NFR BLD: 5.7 %
NEUTROPHILS # BLD: 12.4 K/UL (ref 1.7–7.7)
NEUTROPHILS NFR BLD: 80 %
PERFORMED ON: ABNORMAL
PLATELET # BLD AUTO: 410 K/UL (ref 135–450)
PMV BLD AUTO: 8.7 FL (ref 5–10.5)
POTASSIUM SERPL-SCNC: 4.6 MMOL/L (ref 3.5–5.1)
RBC # BLD AUTO: 6.26 M/UL (ref 4–5.2)
SODIUM SERPL-SCNC: 135 MMOL/L (ref 136–145)
WBC # BLD AUTO: 15.5 K/UL (ref 4–11)

## 2025-04-30 PROCEDURE — 36415 COLL VENOUS BLD VENIPUNCTURE: CPT

## 2025-04-30 PROCEDURE — 6360000002 HC RX W HCPCS: Performed by: INTERNAL MEDICINE

## 2025-04-30 PROCEDURE — 2000000000 HC ICU R&B

## 2025-04-30 PROCEDURE — 94660 CPAP INITIATION&MGMT: CPT

## 2025-04-30 PROCEDURE — 6370000000 HC RX 637 (ALT 250 FOR IP): Performed by: STUDENT IN AN ORGANIZED HEALTH CARE EDUCATION/TRAINING PROGRAM

## 2025-04-30 PROCEDURE — 6370000000 HC RX 637 (ALT 250 FOR IP): Performed by: INTERNAL MEDICINE

## 2025-04-30 PROCEDURE — 6370000000 HC RX 637 (ALT 250 FOR IP): Performed by: HOSPITALIST

## 2025-04-30 PROCEDURE — 94640 AIRWAY INHALATION TREATMENT: CPT

## 2025-04-30 PROCEDURE — 2500000003 HC RX 250 WO HCPCS: Performed by: INTERNAL MEDICINE

## 2025-04-30 PROCEDURE — 94761 N-INVAS EAR/PLS OXIMETRY MLT: CPT

## 2025-04-30 PROCEDURE — 85025 COMPLETE CBC W/AUTO DIFF WBC: CPT

## 2025-04-30 PROCEDURE — 80048 BASIC METABOLIC PNL TOTAL CA: CPT

## 2025-04-30 PROCEDURE — 2580000003 HC RX 258: Performed by: INTERNAL MEDICINE

## 2025-04-30 PROCEDURE — 99291 CRITICAL CARE FIRST HOUR: CPT | Performed by: INTERNAL MEDICINE

## 2025-04-30 PROCEDURE — 2700000000 HC OXYGEN THERAPY PER DAY

## 2025-04-30 RX ORDER — INSULIN GLARGINE 100 [IU]/ML
12 INJECTION, SOLUTION SUBCUTANEOUS 2 TIMES DAILY
Status: DISCONTINUED | OUTPATIENT
Start: 2025-04-30 | End: 2025-05-02

## 2025-04-30 RX ORDER — INSULIN LISPRO 100 [IU]/ML
10 INJECTION, SOLUTION INTRAVENOUS; SUBCUTANEOUS
Status: DISCONTINUED | OUTPATIENT
Start: 2025-04-30 | End: 2025-05-07 | Stop reason: HOSPADM

## 2025-04-30 RX ORDER — GUAIFENESIN/DEXTROMETHORPHAN 100-10MG/5
5 SYRUP ORAL EVERY 6 HOURS
Status: DISCONTINUED | OUTPATIENT
Start: 2025-04-30 | End: 2025-05-07 | Stop reason: HOSPADM

## 2025-04-30 RX ADMIN — BUDESONIDE 500 MCG: 0.5 INHALANT RESPIRATORY (INHALATION) at 20:41

## 2025-04-30 RX ADMIN — INSULIN LISPRO 12 UNITS: 100 INJECTION, SOLUTION INTRAVENOUS; SUBCUTANEOUS at 17:44

## 2025-04-30 RX ADMIN — BUDESONIDE 500 MCG: 0.5 INHALANT RESPIRATORY (INHALATION) at 08:07

## 2025-04-30 RX ADMIN — Medication 10 ML: at 09:07

## 2025-04-30 RX ADMIN — WATER 1000 MG: 1 INJECTION INTRAMUSCULAR; INTRAVENOUS; SUBCUTANEOUS at 15:47

## 2025-04-30 RX ADMIN — ARFORMOTEROL TARTRATE 15 MCG: 15 SOLUTION RESPIRATORY (INHALATION) at 08:07

## 2025-04-30 RX ADMIN — ENOXAPARIN SODIUM 40 MG: 100 INJECTION SUBCUTANEOUS at 08:30

## 2025-04-30 RX ADMIN — INSULIN GLARGINE 18 UNITS: 100 INJECTION, SOLUTION SUBCUTANEOUS at 08:31

## 2025-04-30 RX ADMIN — INSULIN GLARGINE 12 UNITS: 100 INJECTION, SOLUTION SUBCUTANEOUS at 21:21

## 2025-04-30 RX ADMIN — IPRATROPIUM BROMIDE AND ALBUTEROL SULFATE 1 DOSE: .5; 3 SOLUTION RESPIRATORY (INHALATION) at 13:30

## 2025-04-30 RX ADMIN — INSULIN LISPRO 10 UNITS: 100 INJECTION, SOLUTION INTRAVENOUS; SUBCUTANEOUS at 18:48

## 2025-04-30 RX ADMIN — PREDNISONE 20 MG: 20 TABLET ORAL at 08:30

## 2025-04-30 RX ADMIN — INSULIN LISPRO 8 UNITS: 100 INJECTION, SOLUTION INTRAVENOUS; SUBCUTANEOUS at 11:47

## 2025-04-30 RX ADMIN — ENOXAPARIN SODIUM 40 MG: 100 INJECTION SUBCUTANEOUS at 21:20

## 2025-04-30 RX ADMIN — Medication 10 ML: at 21:23

## 2025-04-30 RX ADMIN — ARFORMOTEROL TARTRATE 15 MCG: 15 SOLUTION RESPIRATORY (INHALATION) at 20:41

## 2025-04-30 RX ADMIN — IPRATROPIUM BROMIDE AND ALBUTEROL SULFATE 1 DOSE: .5; 3 SOLUTION RESPIRATORY (INHALATION) at 20:41

## 2025-04-30 RX ADMIN — PANTOPRAZOLE SODIUM 40 MG: 40 TABLET, DELAYED RELEASE ORAL at 06:02

## 2025-04-30 RX ADMIN — IPRATROPIUM BROMIDE AND ALBUTEROL SULFATE 1 DOSE: .5; 3 SOLUTION RESPIRATORY (INHALATION) at 08:07

## 2025-04-30 RX ADMIN — GUAIFENESIN AND DEXTROMETHORPHAN 5 ML: 100; 10 SYRUP ORAL at 19:19

## 2025-04-30 RX ADMIN — GUAIFENESIN 600 MG: 600 TABLET ORAL at 08:30

## 2025-04-30 RX ADMIN — SODIUM CHLORIDE, PRESERVATIVE FREE 0.5 MG: 5 INJECTION INTRAVENOUS at 22:36

## 2025-04-30 RX ADMIN — LEVOTHYROXINE SODIUM 112 MCG: 0.11 TABLET ORAL at 06:02

## 2025-04-30 RX ADMIN — INSULIN LISPRO 14 UNITS: 100 INJECTION, SOLUTION INTRAVENOUS; SUBCUTANEOUS at 13:10

## 2025-04-30 RX ADMIN — INSULIN LISPRO 8 UNITS: 100 INJECTION, SOLUTION INTRAVENOUS; SUBCUTANEOUS at 21:20

## 2025-04-30 RX ADMIN — INSULIN LISPRO 14 UNITS: 100 INJECTION, SOLUTION INTRAVENOUS; SUBCUTANEOUS at 09:02

## 2025-04-30 RX ADMIN — FUROSEMIDE 40 MG: 10 INJECTION, SOLUTION INTRAMUSCULAR; INTRAVENOUS at 08:30

## 2025-04-30 RX ADMIN — GUAIFENESIN AND DEXTROMETHORPHAN 5 ML: 100; 10 SYRUP ORAL at 13:10

## 2025-04-30 ASSESSMENT — PAIN SCALES - GENERAL
PAINLEVEL_OUTOF10: 0
PAINLEVEL_OUTOF10: 2

## 2025-04-30 ASSESSMENT — PAIN DESCRIPTION - LOCATION: LOCATION: BACK

## 2025-04-30 ASSESSMENT — PAIN DESCRIPTION - PAIN TYPE: TYPE: CHRONIC PAIN

## 2025-04-30 NOTE — CARE COORDINATION
DISCHARGE PLANNING: ;  Therapy is recommending ARU placement. [facility] has been consulted.  Otherwise [facility] can accept patient as per Paula [phone]  in admissions.

## 2025-05-01 LAB
ANION GAP SERPL CALCULATED.3IONS-SCNC: 8 MMOL/L (ref 3–16)
BASOPHILS # BLD: 0.1 K/UL (ref 0–0.2)
BASOPHILS NFR BLD: 0.4 %
BUN SERPL-MCNC: 25 MG/DL (ref 7–20)
CALCIUM SERPL-MCNC: 9.1 MG/DL (ref 8.3–10.6)
CHLORIDE SERPL-SCNC: 97 MMOL/L (ref 99–110)
CO2 SERPL-SCNC: 30 MMOL/L (ref 21–32)
CREAT SERPL-MCNC: 0.7 MG/DL (ref 0.6–1.1)
DEPRECATED RDW RBC AUTO: 18.3 % (ref 12.4–15.4)
EOSINOPHIL # BLD: 0.1 K/UL (ref 0–0.6)
EOSINOPHIL NFR BLD: 0.8 %
GFR SERPLBLD CREATININE-BSD FMLA CKD-EPI: >90 ML/MIN/{1.73_M2}
GLUCOSE BLD-MCNC: 178 MG/DL (ref 70–99)
GLUCOSE BLD-MCNC: 274 MG/DL (ref 70–99)
GLUCOSE BLD-MCNC: 314 MG/DL (ref 70–99)
GLUCOSE BLD-MCNC: 315 MG/DL (ref 70–99)
GLUCOSE SERPL-MCNC: 255 MG/DL (ref 70–99)
HCT VFR BLD AUTO: 49.7 % (ref 36–48)
HGB BLD-MCNC: 16.1 G/DL (ref 12–16)
LYMPHOCYTES # BLD: 1.6 K/UL (ref 1–5.1)
LYMPHOCYTES NFR BLD: 12 %
MCH RBC QN AUTO: 27 PG (ref 26–34)
MCHC RBC AUTO-ENTMCNC: 32.3 G/DL (ref 31–36)
MCV RBC AUTO: 83.5 FL (ref 80–100)
MONOCYTES # BLD: 0.8 K/UL (ref 0–1.3)
MONOCYTES NFR BLD: 5.6 %
NEUTROPHILS # BLD: 11.1 K/UL (ref 1.7–7.7)
NEUTROPHILS NFR BLD: 81.2 %
NT-PROBNP SERPL-MCNC: 81 PG/ML (ref 0–124)
PERFORMED ON: ABNORMAL
PLATELET # BLD AUTO: 345 K/UL (ref 135–450)
PMV BLD AUTO: 8.6 FL (ref 5–10.5)
POTASSIUM SERPL-SCNC: 4.7 MMOL/L (ref 3.5–5.1)
RBC # BLD AUTO: 5.95 M/UL (ref 4–5.2)
SODIUM SERPL-SCNC: 135 MMOL/L (ref 136–145)
WBC # BLD AUTO: 13.6 K/UL (ref 4–11)

## 2025-05-01 PROCEDURE — 94761 N-INVAS EAR/PLS OXIMETRY MLT: CPT

## 2025-05-01 PROCEDURE — 97535 SELF CARE MNGMENT TRAINING: CPT

## 2025-05-01 PROCEDURE — 6370000000 HC RX 637 (ALT 250 FOR IP): Performed by: STUDENT IN AN ORGANIZED HEALTH CARE EDUCATION/TRAINING PROGRAM

## 2025-05-01 PROCEDURE — 2500000003 HC RX 250 WO HCPCS: Performed by: INTERNAL MEDICINE

## 2025-05-01 PROCEDURE — 2000000000 HC ICU R&B

## 2025-05-01 PROCEDURE — 80048 BASIC METABOLIC PNL TOTAL CA: CPT

## 2025-05-01 PROCEDURE — 83880 ASSAY OF NATRIURETIC PEPTIDE: CPT

## 2025-05-01 PROCEDURE — 6370000000 HC RX 637 (ALT 250 FOR IP): Performed by: INTERNAL MEDICINE

## 2025-05-01 PROCEDURE — 94640 AIRWAY INHALATION TREATMENT: CPT

## 2025-05-01 PROCEDURE — 97116 GAIT TRAINING THERAPY: CPT

## 2025-05-01 PROCEDURE — 97530 THERAPEUTIC ACTIVITIES: CPT

## 2025-05-01 PROCEDURE — 2700000000 HC OXYGEN THERAPY PER DAY

## 2025-05-01 PROCEDURE — 99233 SBSQ HOSP IP/OBS HIGH 50: CPT | Performed by: INTERNAL MEDICINE

## 2025-05-01 PROCEDURE — 2580000003 HC RX 258: Performed by: INTERNAL MEDICINE

## 2025-05-01 PROCEDURE — 85025 COMPLETE CBC W/AUTO DIFF WBC: CPT

## 2025-05-01 PROCEDURE — 6370000000 HC RX 637 (ALT 250 FOR IP): Performed by: HOSPITALIST

## 2025-05-01 PROCEDURE — 6360000002 HC RX W HCPCS: Performed by: INTERNAL MEDICINE

## 2025-05-01 PROCEDURE — 94660 CPAP INITIATION&MGMT: CPT

## 2025-05-01 PROCEDURE — 36415 COLL VENOUS BLD VENIPUNCTURE: CPT

## 2025-05-01 RX ORDER — FUROSEMIDE 40 MG/1
40 TABLET ORAL DAILY
Status: DISCONTINUED | OUTPATIENT
Start: 2025-05-02 | End: 2025-05-03

## 2025-05-01 RX ADMIN — INSULIN LISPRO 10 UNITS: 100 INJECTION, SOLUTION INTRAVENOUS; SUBCUTANEOUS at 13:13

## 2025-05-01 RX ADMIN — ARFORMOTEROL TARTRATE 15 MCG: 15 SOLUTION RESPIRATORY (INHALATION) at 20:42

## 2025-05-01 RX ADMIN — BUDESONIDE 500 MCG: 0.5 INHALANT RESPIRATORY (INHALATION) at 20:41

## 2025-05-01 RX ADMIN — IPRATROPIUM BROMIDE AND ALBUTEROL SULFATE 1 DOSE: .5; 3 SOLUTION RESPIRATORY (INHALATION) at 08:36

## 2025-05-01 RX ADMIN — GUAIFENESIN AND DEXTROMETHORPHAN 5 ML: 100; 10 SYRUP ORAL at 17:48

## 2025-05-01 RX ADMIN — Medication 10 ML: at 21:13

## 2025-05-01 RX ADMIN — SODIUM CHLORIDE, PRESERVATIVE FREE 0.5 MG: 5 INJECTION INTRAVENOUS at 22:04

## 2025-05-01 RX ADMIN — ENOXAPARIN SODIUM 40 MG: 100 INJECTION SUBCUTANEOUS at 21:12

## 2025-05-01 RX ADMIN — GUAIFENESIN AND DEXTROMETHORPHAN 5 ML: 100; 10 SYRUP ORAL at 13:12

## 2025-05-01 RX ADMIN — PANTOPRAZOLE SODIUM 40 MG: 40 TABLET, DELAYED RELEASE ORAL at 05:19

## 2025-05-01 RX ADMIN — INSULIN LISPRO 8 UNITS: 100 INJECTION, SOLUTION INTRAVENOUS; SUBCUTANEOUS at 13:12

## 2025-05-01 RX ADMIN — ENOXAPARIN SODIUM 40 MG: 100 INJECTION SUBCUTANEOUS at 08:47

## 2025-05-01 RX ADMIN — Medication 10 ML: at 08:47

## 2025-05-01 RX ADMIN — LEVOTHYROXINE SODIUM 112 MCG: 0.11 TABLET ORAL at 05:19

## 2025-05-01 RX ADMIN — FUROSEMIDE 40 MG: 10 INJECTION, SOLUTION INTRAMUSCULAR; INTRAVENOUS at 08:47

## 2025-05-01 RX ADMIN — INSULIN LISPRO 10 UNITS: 100 INJECTION, SOLUTION INTRAVENOUS; SUBCUTANEOUS at 17:49

## 2025-05-01 RX ADMIN — PREDNISONE 10 MG: 10 TABLET ORAL at 08:46

## 2025-05-01 RX ADMIN — GUAIFENESIN AND DEXTROMETHORPHAN 5 ML: 100; 10 SYRUP ORAL at 05:19

## 2025-05-01 RX ADMIN — ARFORMOTEROL TARTRATE 15 MCG: 15 SOLUTION RESPIRATORY (INHALATION) at 08:37

## 2025-05-01 RX ADMIN — GUAIFENESIN AND DEXTROMETHORPHAN 5 ML: 100; 10 SYRUP ORAL at 01:05

## 2025-05-01 RX ADMIN — INSULIN LISPRO 12 UNITS: 100 INJECTION, SOLUTION INTRAVENOUS; SUBCUTANEOUS at 21:12

## 2025-05-01 RX ADMIN — INSULIN LISPRO 10 UNITS: 100 INJECTION, SOLUTION INTRAVENOUS; SUBCUTANEOUS at 08:46

## 2025-05-01 RX ADMIN — INSULIN GLARGINE 12 UNITS: 100 INJECTION, SOLUTION SUBCUTANEOUS at 21:12

## 2025-05-01 RX ADMIN — IPRATROPIUM BROMIDE AND ALBUTEROL SULFATE 1 DOSE: .5; 3 SOLUTION RESPIRATORY (INHALATION) at 16:28

## 2025-05-01 RX ADMIN — GUAIFENESIN AND DEXTROMETHORPHAN 5 ML: 100; 10 SYRUP ORAL at 22:19

## 2025-05-01 RX ADMIN — IPRATROPIUM BROMIDE AND ALBUTEROL SULFATE 1 DOSE: .5; 3 SOLUTION RESPIRATORY (INHALATION) at 20:41

## 2025-05-01 RX ADMIN — INSULIN LISPRO 12 UNITS: 100 INJECTION, SOLUTION INTRAVENOUS; SUBCUTANEOUS at 17:49

## 2025-05-01 RX ADMIN — INSULIN GLARGINE 12 UNITS: 100 INJECTION, SOLUTION SUBCUTANEOUS at 08:46

## 2025-05-01 ASSESSMENT — PAIN SCALES - GENERAL
PAINLEVEL_OUTOF10: 2
PAINLEVEL_OUTOF10: 2

## 2025-05-01 NOTE — CONSULTS
Jaden Denise  5/1/2025  [id number]    Chief Complaint: Pneumonia due to infectious agent    Subjective   HPI: Jaden Denise is a 60 y.o. female with PMHx notable for T2DM, COPD, hypothyroidism, tobacco use disorder, morbid obesity who presented on 4/23/2025 with dyspnea and cough.  She was hypoxic on room air, CT chest showed patchy bilateral infiltrates, left greater than right.  She required NIV with BiPAP.  She was started on broad-spectrum IV antibiotics, IV steroids and breathing treatments for COPD exacerbation and bilateral multifocal pneumonia.  Pulmonology consulted, following.  She completed course of IV ceftriaxone and azithromycin.  She continues to require high flow nasal cannula oxygen, but has been tolerating gradual wean.  She is requiring BiPAP overnight, with concern for underlying ARISTEO.  She is also being diuresed with oral Lasix.     Patient reports that she is feeling better overall today.  She is feeling less short of breath.  She denies any significant extremity weakness.  She reports stable numbness/paresthesia involving her feet bilaterally, which is chronic.  She says that one of her largest functional limitations is actually urinary incontinence, which is also a chronic issue for which she usually takes oxybutynin at home. Last BM (including prior to admit): 04/30/25.    Past Medical History:   Diagnosis Date    COPD (chronic obstructive pulmonary disease) (HCC)     Diabetes mellitus (HCC)     Thyroid disease        Past Surgical History:   Procedure Laterality Date    ENDOMETRIAL ABLATION      x 2    OVARY REMOVAL Left     SPINAL FUSION         Social History     Tobacco Use    Smoking status: Every Day     Current packs/day: 1.00     Average packs/day: 1 pack/day for 44.3 years (44.3 ttl pk-yrs)     Types: Cigarettes     Start date: 1981    Smokeless tobacco: Never   Vaping Use    Vaping status: Never Used   Substance Use Topics    Alcohol use: Never    Drug use: Never       Prior

## 2025-05-01 NOTE — CARE COORDINATION
Discharge Planning;  Patient was evaluated by [facility] MD who documented that  patient  is not appropriate for IPR due to requiring high flow nasal cannula   supplemental oxygen. LTACH most appropriate at this time.     CM met with patient who stated she  would like to go to the ARU here in [place].  She was updated with the ARU MD's recommendations.  She  stated  that she is  not willing to go to [facility] because all the locations are far from where she lives in [place]. She has concerns about her son who is not in good health.   She also reported that she spoke with Paula with [facility] admissions  and told her the same thing.  She also does not want to go to any Skilled Nursing facility.  Patient reports that she would like to stay in the hospital until her oxygen requirements drop to the level that she can safely discharge to ARU.  She was informed that the MD will be informed of her request.  Otherwise [facility] is able to accept patient and no precert is required.

## 2025-05-02 LAB
ANION GAP SERPL CALCULATED.3IONS-SCNC: 11 MMOL/L (ref 3–16)
BASOPHILS # BLD: 0.1 K/UL (ref 0–0.2)
BASOPHILS NFR BLD: 0.4 %
BUN SERPL-MCNC: 20 MG/DL (ref 7–20)
CALCIUM SERPL-MCNC: 8.9 MG/DL (ref 8.3–10.6)
CHLORIDE SERPL-SCNC: 99 MMOL/L (ref 99–110)
CO2 SERPL-SCNC: 28 MMOL/L (ref 21–32)
CREAT SERPL-MCNC: 0.6 MG/DL (ref 0.6–1.1)
DEPRECATED RDW RBC AUTO: 18.3 % (ref 12.4–15.4)
EOSINOPHIL # BLD: 0.1 K/UL (ref 0–0.6)
EOSINOPHIL NFR BLD: 1 %
GFR SERPLBLD CREATININE-BSD FMLA CKD-EPI: >90 ML/MIN/{1.73_M2}
GLUCOSE BLD-MCNC: 226 MG/DL (ref 70–99)
GLUCOSE BLD-MCNC: 251 MG/DL (ref 70–99)
GLUCOSE BLD-MCNC: 281 MG/DL (ref 70–99)
GLUCOSE BLD-MCNC: 293 MG/DL (ref 70–99)
GLUCOSE SERPL-MCNC: 228 MG/DL (ref 70–99)
HCT VFR BLD AUTO: 51.3 % (ref 36–48)
HGB BLD-MCNC: 16.4 G/DL (ref 12–16)
LYMPHOCYTES # BLD: 1.6 K/UL (ref 1–5.1)
LYMPHOCYTES NFR BLD: 13.1 %
MCH RBC QN AUTO: 26.8 PG (ref 26–34)
MCHC RBC AUTO-ENTMCNC: 32 G/DL (ref 31–36)
MCV RBC AUTO: 83.9 FL (ref 80–100)
MONOCYTES # BLD: 0.8 K/UL (ref 0–1.3)
MONOCYTES NFR BLD: 6.3 %
NEUTROPHILS # BLD: 9.5 K/UL (ref 1.7–7.7)
NEUTROPHILS NFR BLD: 79.2 %
PERFORMED ON: ABNORMAL
PLATELET # BLD AUTO: 319 K/UL (ref 135–450)
PMV BLD AUTO: 8.7 FL (ref 5–10.5)
POTASSIUM SERPL-SCNC: 4.2 MMOL/L (ref 3.5–5.1)
RBC # BLD AUTO: 6.11 M/UL (ref 4–5.2)
SODIUM SERPL-SCNC: 138 MMOL/L (ref 136–145)
WBC # BLD AUTO: 11.9 K/UL (ref 4–11)

## 2025-05-02 PROCEDURE — 6370000000 HC RX 637 (ALT 250 FOR IP): Performed by: HOSPITALIST

## 2025-05-02 PROCEDURE — 6370000000 HC RX 637 (ALT 250 FOR IP): Performed by: STUDENT IN AN ORGANIZED HEALTH CARE EDUCATION/TRAINING PROGRAM

## 2025-05-02 PROCEDURE — 6370000000 HC RX 637 (ALT 250 FOR IP): Performed by: INTERNAL MEDICINE

## 2025-05-02 PROCEDURE — 6360000002 HC RX W HCPCS: Performed by: INTERNAL MEDICINE

## 2025-05-02 PROCEDURE — 2000000000 HC ICU R&B

## 2025-05-02 PROCEDURE — 94640 AIRWAY INHALATION TREATMENT: CPT

## 2025-05-02 PROCEDURE — 99233 SBSQ HOSP IP/OBS HIGH 50: CPT | Performed by: INTERNAL MEDICINE

## 2025-05-02 PROCEDURE — 2580000003 HC RX 258: Performed by: INTERNAL MEDICINE

## 2025-05-02 PROCEDURE — 2500000003 HC RX 250 WO HCPCS: Performed by: INTERNAL MEDICINE

## 2025-05-02 PROCEDURE — 94761 N-INVAS EAR/PLS OXIMETRY MLT: CPT

## 2025-05-02 PROCEDURE — 85025 COMPLETE CBC W/AUTO DIFF WBC: CPT

## 2025-05-02 PROCEDURE — 80048 BASIC METABOLIC PNL TOTAL CA: CPT

## 2025-05-02 PROCEDURE — 36415 COLL VENOUS BLD VENIPUNCTURE: CPT

## 2025-05-02 PROCEDURE — 94660 CPAP INITIATION&MGMT: CPT

## 2025-05-02 PROCEDURE — 2700000000 HC OXYGEN THERAPY PER DAY

## 2025-05-02 RX ORDER — INSULIN GLARGINE 100 [IU]/ML
18 INJECTION, SOLUTION SUBCUTANEOUS 2 TIMES DAILY
Status: DISCONTINUED | OUTPATIENT
Start: 2025-05-02 | End: 2025-05-07 | Stop reason: HOSPADM

## 2025-05-02 RX ADMIN — PANTOPRAZOLE SODIUM 40 MG: 40 TABLET, DELAYED RELEASE ORAL at 06:25

## 2025-05-02 RX ADMIN — IPRATROPIUM BROMIDE AND ALBUTEROL SULFATE 1 DOSE: .5; 3 SOLUTION RESPIRATORY (INHALATION) at 19:59

## 2025-05-02 RX ADMIN — INSULIN LISPRO 10 UNITS: 100 INJECTION, SOLUTION INTRAVENOUS; SUBCUTANEOUS at 11:58

## 2025-05-02 RX ADMIN — ENOXAPARIN SODIUM 40 MG: 100 INJECTION SUBCUTANEOUS at 21:08

## 2025-05-02 RX ADMIN — SODIUM CHLORIDE, PRESERVATIVE FREE 0.5 MG: 5 INJECTION INTRAVENOUS at 22:50

## 2025-05-02 RX ADMIN — ARFORMOTEROL TARTRATE 15 MCG: 15 SOLUTION RESPIRATORY (INHALATION) at 19:59

## 2025-05-02 RX ADMIN — FUROSEMIDE 40 MG: 40 TABLET ORAL at 08:31

## 2025-05-02 RX ADMIN — INSULIN LISPRO 8 UNITS: 100 INJECTION, SOLUTION INTRAVENOUS; SUBCUTANEOUS at 08:32

## 2025-05-02 RX ADMIN — Medication 10 ML: at 21:09

## 2025-05-02 RX ADMIN — INSULIN LISPRO 8 UNITS: 100 INJECTION, SOLUTION INTRAVENOUS; SUBCUTANEOUS at 21:08

## 2025-05-02 RX ADMIN — BUDESONIDE 500 MCG: 0.5 INHALANT RESPIRATORY (INHALATION) at 19:59

## 2025-05-02 RX ADMIN — LEVOTHYROXINE SODIUM 112 MCG: 0.11 TABLET ORAL at 06:25

## 2025-05-02 RX ADMIN — GUAIFENESIN AND DEXTROMETHORPHAN 5 ML: 100; 10 SYRUP ORAL at 17:00

## 2025-05-02 RX ADMIN — BUDESONIDE 500 MCG: 0.5 INHALANT RESPIRATORY (INHALATION) at 08:13

## 2025-05-02 RX ADMIN — ARFORMOTEROL TARTRATE 15 MCG: 15 SOLUTION RESPIRATORY (INHALATION) at 08:13

## 2025-05-02 RX ADMIN — GUAIFENESIN AND DEXTROMETHORPHAN 5 ML: 100; 10 SYRUP ORAL at 06:25

## 2025-05-02 RX ADMIN — Medication 10 ML: at 08:32

## 2025-05-02 RX ADMIN — IPRATROPIUM BROMIDE AND ALBUTEROL SULFATE 1 DOSE: .5; 3 SOLUTION RESPIRATORY (INHALATION) at 14:56

## 2025-05-02 RX ADMIN — INSULIN LISPRO 10 UNITS: 100 INJECTION, SOLUTION INTRAVENOUS; SUBCUTANEOUS at 17:00

## 2025-05-02 RX ADMIN — INSULIN LISPRO 8 UNITS: 100 INJECTION, SOLUTION INTRAVENOUS; SUBCUTANEOUS at 17:00

## 2025-05-02 RX ADMIN — INSULIN LISPRO 10 UNITS: 100 INJECTION, SOLUTION INTRAVENOUS; SUBCUTANEOUS at 08:31

## 2025-05-02 RX ADMIN — ENOXAPARIN SODIUM 40 MG: 100 INJECTION SUBCUTANEOUS at 08:31

## 2025-05-02 RX ADMIN — INSULIN LISPRO 4 UNITS: 100 INJECTION, SOLUTION INTRAVENOUS; SUBCUTANEOUS at 11:58

## 2025-05-02 RX ADMIN — GUAIFENESIN AND DEXTROMETHORPHAN 5 ML: 100; 10 SYRUP ORAL at 22:50

## 2025-05-02 RX ADMIN — GUAIFENESIN AND DEXTROMETHORPHAN 5 ML: 100; 10 SYRUP ORAL at 11:57

## 2025-05-02 RX ADMIN — PREDNISONE 5 MG: 5 TABLET ORAL at 08:31

## 2025-05-02 RX ADMIN — IPRATROPIUM BROMIDE AND ALBUTEROL SULFATE 1 DOSE: .5; 3 SOLUTION RESPIRATORY (INHALATION) at 08:12

## 2025-05-02 RX ADMIN — INSULIN GLARGINE 12 UNITS: 100 INJECTION, SOLUTION SUBCUTANEOUS at 08:32

## 2025-05-02 RX ADMIN — INSULIN GLARGINE 18 UNITS: 100 INJECTION, SOLUTION SUBCUTANEOUS at 21:08

## 2025-05-02 ASSESSMENT — PAIN SCALES - GENERAL
PAINLEVEL_OUTOF10: 0

## 2025-05-02 NOTE — CARE COORDINATION
Prior Authorization submitted for ARU approval.     ARU will continue to follow progress and update discharge plan as needed.    LAI MccordN, .[phone]

## 2025-05-02 NOTE — CONSULTS
Jaden Denise  5/2/2025  [id number]    Chief Complaint: Pneumonia due to infectious agent    Subjective   Since last seen, medical updates:  - On 6L O2 via HFNC.    Patient reports that she is breathing comfortably today. Tolerating some standing, walking within her hospital room. Denies any new concerns. She remains interested in inpatient rehab to improve her function prior to returning home.             Objective   Objective:  Patient Vitals for the past 24 hrs:   BP Temp Temp src Pulse Resp SpO2 Weight   05/02/25 1600 -- 97 °F (36.1 °C) Temporal -- -- -- --   05/02/25 1500 -- -- -- (!) 109 13 93 % --   05/02/25 1457 -- -- -- (!) 106 18 93 % --   05/02/25 1400 (!) 141/59 -- -- (!) 111 22 91 % --   05/02/25 1300 -- -- -- (!) 109 26 91 % --   05/02/25 1200 135/62 97.1 °F (36.2 °C) Temporal (!) 121 18 90 % --   05/02/25 1100 -- -- -- (!) 109 19 91 % --   05/02/25 1000 127/66 -- -- (!) 114 21 91 % --   05/02/25 0900 127/74 97.1 °F (36.2 °C) Temporal (!) 106 18 93 % --   05/02/25 0822 -- -- -- 98 18 93 % --   05/02/25 0821 -- -- -- 99 18 93 % --   05/02/25 0813 -- -- -- 99 18 93 % --   05/02/25 0800 122/62 96.9 °F (36.1 °C) Temporal 91 19 95 % --   05/02/25 0600 127/74 -- -- 90 16 94 % (!) 142.5 kg (314 lb 2.5 oz)   05/02/25 0400 (!) 113/92 97.3 °F (36.3 °C) Temporal 91 24 94 % --   05/02/25 0308 -- -- -- 89 20 94 % --   05/02/25 0200 122/66 -- -- 94 23 92 % --   05/02/25 0000 109/64 97.5 °F (36.4 °C) Temporal (!) 111 21 93 % --   05/01/25 2321 -- -- -- (!) 113 24 92 % --   05/01/25 2200 (!) 145/58 97.3 °F (36.3 °C) Temporal (!) 118 20 92 % --   05/01/25 2100 -- -- -- (!) 106 23 93 % --   05/01/25 2042 -- -- -- (!) 116 18 91 % --   05/01/25 2000 (!) 145/79 97.8 °F (36.6 °C) Temporal (!) 107 22 93 % --     Gen: No distress.  Seated in bedside chair.  HEENT: Normocephalic, atraumatic.   CV: Extremities warm, perfused.  Resp: No respiratory distress.  On 6 L O2 via nasal cannula.  Abd: Soft, nontender

## 2025-05-02 NOTE — CARE COORDINATION
Discharge Planning:      (CM) reviewed the patient's chart. IP LOS day 8.     Patient's Readmission Risk Score is:11%  Patient's PCP is:Homer Bolton DO   Primary insurance is: [employer]   Secondary insurance is:[employer] [place]   Discharge plan:Pt wants ARU    Patient here for PNA. Pt on 6L/NC. Patient from home.      CM team to follow. Staff to inform CM if additional discharge needs arise.    Electronically signed by Elizabeth Lake RN on 5/2/2025 at 8:57 AM

## 2025-05-03 LAB
ANION GAP SERPL CALCULATED.3IONS-SCNC: 9 MMOL/L (ref 3–16)
BACTERIA URNS QL MICRO: ABNORMAL /HPF
BASOPHILS # BLD: 0.1 K/UL (ref 0–0.2)
BASOPHILS NFR BLD: 0.7 %
BILIRUB UR QL STRIP.AUTO: NEGATIVE
BUDDING YEAST: PRESENT
BUN SERPL-MCNC: 16 MG/DL (ref 7–20)
CALCIUM SERPL-MCNC: 8.9 MG/DL (ref 8.3–10.6)
CHLORIDE SERPL-SCNC: 99 MMOL/L (ref 99–110)
CLARITY UR: CLEAR
CO2 SERPL-SCNC: 28 MMOL/L (ref 21–32)
COLOR UR: YELLOW
CREAT SERPL-MCNC: 0.5 MG/DL (ref 0.6–1.1)
DEPRECATED RDW RBC AUTO: 17.9 % (ref 12.4–15.4)
EOSINOPHIL # BLD: 0.1 K/UL (ref 0–0.6)
EOSINOPHIL NFR BLD: 0.7 %
EPI CELLS #/AREA URNS AUTO: 2 /HPF (ref 0–5)
GFR SERPLBLD CREATININE-BSD FMLA CKD-EPI: >90 ML/MIN/{1.73_M2}
GLUCOSE BLD-MCNC: 205 MG/DL (ref 70–99)
GLUCOSE BLD-MCNC: 210 MG/DL (ref 70–99)
GLUCOSE BLD-MCNC: 221 MG/DL (ref 70–99)
GLUCOSE BLD-MCNC: 285 MG/DL (ref 70–99)
GLUCOSE SERPL-MCNC: 209 MG/DL (ref 70–99)
GLUCOSE UR STRIP.AUTO-MCNC: NEGATIVE MG/DL
HCT VFR BLD AUTO: 46.9 % (ref 36–48)
HGB BLD-MCNC: 15.2 G/DL (ref 12–16)
HGB UR QL STRIP.AUTO: ABNORMAL
HYALINE CASTS #/AREA URNS AUTO: 0 /LPF (ref 0–8)
KETONES UR STRIP.AUTO-MCNC: NEGATIVE MG/DL
LEUKOCYTE ESTERASE UR QL STRIP.AUTO: ABNORMAL
LYMPHOCYTES # BLD: 1.4 K/UL (ref 1–5.1)
LYMPHOCYTES NFR BLD: 11.6 %
MCH RBC QN AUTO: 26.7 PG (ref 26–34)
MCHC RBC AUTO-ENTMCNC: 32.4 G/DL (ref 31–36)
MCV RBC AUTO: 82.4 FL (ref 80–100)
MONOCYTES # BLD: 0.8 K/UL (ref 0–1.3)
MONOCYTES NFR BLD: 6.8 %
NEUTROPHILS # BLD: 9.6 K/UL (ref 1.7–7.7)
NEUTROPHILS NFR BLD: 80.2 %
NITRITE UR QL STRIP.AUTO: NEGATIVE
PERFORMED ON: ABNORMAL
PH UR STRIP.AUTO: 7.5 [PH] (ref 5–8)
PLATELET # BLD AUTO: 275 K/UL (ref 135–450)
PMV BLD AUTO: 8.7 FL (ref 5–10.5)
POTASSIUM SERPL-SCNC: 4.3 MMOL/L (ref 3.5–5.1)
PROT UR STRIP.AUTO-MCNC: NEGATIVE MG/DL
RBC # BLD AUTO: 5.69 M/UL (ref 4–5.2)
RBC CLUMPS #/AREA URNS AUTO: 10 /HPF (ref 0–4)
SODIUM SERPL-SCNC: 136 MMOL/L (ref 136–145)
SP GR UR STRIP.AUTO: 1.01 (ref 1–1.03)
UA COMPLETE W REFLEX CULTURE PNL UR: ABNORMAL
UA DIPSTICK W REFLEX MICRO PNL UR: YES
URN SPEC COLLECT METH UR: ABNORMAL
UROBILINOGEN UR STRIP-ACNC: 1 E.U./DL
WBC # BLD AUTO: 12 K/UL (ref 4–11)
WBC #/AREA URNS AUTO: 5 /HPF (ref 0–5)

## 2025-05-03 PROCEDURE — 6370000000 HC RX 637 (ALT 250 FOR IP): Performed by: STUDENT IN AN ORGANIZED HEALTH CARE EDUCATION/TRAINING PROGRAM

## 2025-05-03 PROCEDURE — 2500000003 HC RX 250 WO HCPCS: Performed by: INTERNAL MEDICINE

## 2025-05-03 PROCEDURE — 80048 BASIC METABOLIC PNL TOTAL CA: CPT

## 2025-05-03 PROCEDURE — 81001 URINALYSIS AUTO W/SCOPE: CPT

## 2025-05-03 PROCEDURE — 6360000002 HC RX W HCPCS: Performed by: INTERNAL MEDICINE

## 2025-05-03 PROCEDURE — 2060000000 HC ICU INTERMEDIATE R&B

## 2025-05-03 PROCEDURE — 36415 COLL VENOUS BLD VENIPUNCTURE: CPT

## 2025-05-03 PROCEDURE — 2700000000 HC OXYGEN THERAPY PER DAY

## 2025-05-03 PROCEDURE — 94640 AIRWAY INHALATION TREATMENT: CPT

## 2025-05-03 PROCEDURE — 85025 COMPLETE CBC W/AUTO DIFF WBC: CPT

## 2025-05-03 PROCEDURE — 6370000000 HC RX 637 (ALT 250 FOR IP): Performed by: HOSPITALIST

## 2025-05-03 PROCEDURE — 6370000000 HC RX 637 (ALT 250 FOR IP): Performed by: INTERNAL MEDICINE

## 2025-05-03 PROCEDURE — 94761 N-INVAS EAR/PLS OXIMETRY MLT: CPT

## 2025-05-03 RX ORDER — FUROSEMIDE 20 MG/1
20 TABLET ORAL DAILY
Status: DISCONTINUED | OUTPATIENT
Start: 2025-05-04 | End: 2025-05-07 | Stop reason: HOSPADM

## 2025-05-03 RX ADMIN — ARFORMOTEROL TARTRATE 15 MCG: 15 SOLUTION RESPIRATORY (INHALATION) at 09:13

## 2025-05-03 RX ADMIN — BUDESONIDE 500 MCG: 0.5 INHALANT RESPIRATORY (INHALATION) at 20:25

## 2025-05-03 RX ADMIN — PANTOPRAZOLE SODIUM 40 MG: 40 TABLET, DELAYED RELEASE ORAL at 06:37

## 2025-05-03 RX ADMIN — GUAIFENESIN AND DEXTROMETHORPHAN 5 ML: 100; 10 SYRUP ORAL at 11:29

## 2025-05-03 RX ADMIN — BUDESONIDE 500 MCG: 0.5 INHALANT RESPIRATORY (INHALATION) at 09:13

## 2025-05-03 RX ADMIN — INSULIN LISPRO 4 UNITS: 100 INJECTION, SOLUTION INTRAVENOUS; SUBCUTANEOUS at 11:29

## 2025-05-03 RX ADMIN — Medication 10 ML: at 08:47

## 2025-05-03 RX ADMIN — INSULIN LISPRO 4 UNITS: 100 INJECTION, SOLUTION INTRAVENOUS; SUBCUTANEOUS at 17:25

## 2025-05-03 RX ADMIN — IPRATROPIUM BROMIDE AND ALBUTEROL SULFATE 1 DOSE: .5; 3 SOLUTION RESPIRATORY (INHALATION) at 09:13

## 2025-05-03 RX ADMIN — ENOXAPARIN SODIUM 40 MG: 100 INJECTION SUBCUTANEOUS at 08:47

## 2025-05-03 RX ADMIN — INSULIN LISPRO 10 UNITS: 100 INJECTION, SOLUTION INTRAVENOUS; SUBCUTANEOUS at 08:46

## 2025-05-03 RX ADMIN — LEVOTHYROXINE SODIUM 112 MCG: 0.11 TABLET ORAL at 06:37

## 2025-05-03 RX ADMIN — ACETAMINOPHEN 650 MG: 325 TABLET ORAL at 02:57

## 2025-05-03 RX ADMIN — GUAIFENESIN AND DEXTROMETHORPHAN 5 ML: 100; 10 SYRUP ORAL at 06:37

## 2025-05-03 RX ADMIN — INSULIN GLARGINE 18 UNITS: 100 INJECTION, SOLUTION SUBCUTANEOUS at 08:46

## 2025-05-03 RX ADMIN — IPRATROPIUM BROMIDE AND ALBUTEROL SULFATE 1 DOSE: .5; 3 SOLUTION RESPIRATORY (INHALATION) at 13:47

## 2025-05-03 RX ADMIN — INSULIN LISPRO 10 UNITS: 100 INJECTION, SOLUTION INTRAVENOUS; SUBCUTANEOUS at 17:26

## 2025-05-03 RX ADMIN — ARFORMOTEROL TARTRATE 15 MCG: 15 SOLUTION RESPIRATORY (INHALATION) at 20:25

## 2025-05-03 RX ADMIN — INSULIN LISPRO 8 UNITS: 100 INJECTION, SOLUTION INTRAVENOUS; SUBCUTANEOUS at 22:12

## 2025-05-03 RX ADMIN — GUAIFENESIN AND DEXTROMETHORPHAN 5 ML: 100; 10 SYRUP ORAL at 17:28

## 2025-05-03 RX ADMIN — FUROSEMIDE 40 MG: 40 TABLET ORAL at 08:46

## 2025-05-03 RX ADMIN — ENOXAPARIN SODIUM 40 MG: 100 INJECTION SUBCUTANEOUS at 22:24

## 2025-05-03 RX ADMIN — IPRATROPIUM BROMIDE AND ALBUTEROL SULFATE 1 DOSE: .5; 3 SOLUTION RESPIRATORY (INHALATION) at 20:25

## 2025-05-03 RX ADMIN — INSULIN GLARGINE 18 UNITS: 100 INJECTION, SOLUTION SUBCUTANEOUS at 22:18

## 2025-05-03 RX ADMIN — INSULIN LISPRO 10 UNITS: 100 INJECTION, SOLUTION INTRAVENOUS; SUBCUTANEOUS at 11:29

## 2025-05-03 ASSESSMENT — PAIN DESCRIPTION - DESCRIPTORS: DESCRIPTORS: BURNING

## 2025-05-03 ASSESSMENT — PAIN SCALES - GENERAL: PAINLEVEL_OUTOF10: 4

## 2025-05-03 ASSESSMENT — PAIN DESCRIPTION - LOCATION: LOCATION: OTHER (COMMENT)

## 2025-05-04 LAB
ANION GAP SERPL CALCULATED.3IONS-SCNC: 8 MMOL/L (ref 3–16)
BASOPHILS # BLD: 0.1 K/UL (ref 0–0.2)
BASOPHILS NFR BLD: 0.8 %
BUN SERPL-MCNC: 17 MG/DL (ref 7–20)
CALCIUM SERPL-MCNC: 9.1 MG/DL (ref 8.3–10.6)
CHLORIDE SERPL-SCNC: 97 MMOL/L (ref 99–110)
CO2 SERPL-SCNC: 29 MMOL/L (ref 21–32)
CREAT SERPL-MCNC: 0.6 MG/DL (ref 0.6–1.1)
DEPRECATED RDW RBC AUTO: 18.2 % (ref 12.4–15.4)
EOSINOPHIL # BLD: 0.1 K/UL (ref 0–0.6)
EOSINOPHIL NFR BLD: 0.6 %
GFR SERPLBLD CREATININE-BSD FMLA CKD-EPI: >90 ML/MIN/{1.73_M2}
GLUCOSE BLD-MCNC: 182 MG/DL (ref 70–99)
GLUCOSE BLD-MCNC: 208 MG/DL (ref 70–99)
GLUCOSE BLD-MCNC: 214 MG/DL (ref 70–99)
GLUCOSE BLD-MCNC: 286 MG/DL (ref 70–99)
GLUCOSE SERPL-MCNC: 206 MG/DL (ref 70–99)
HCT VFR BLD AUTO: 48.3 % (ref 36–48)
HGB BLD-MCNC: 15.8 G/DL (ref 12–16)
LYMPHOCYTES # BLD: 1.4 K/UL (ref 1–5.1)
LYMPHOCYTES NFR BLD: 12.5 %
MCH RBC QN AUTO: 26.9 PG (ref 26–34)
MCHC RBC AUTO-ENTMCNC: 32.7 G/DL (ref 31–36)
MCV RBC AUTO: 82.3 FL (ref 80–100)
MONOCYTES # BLD: 0.9 K/UL (ref 0–1.3)
MONOCYTES NFR BLD: 8 %
NEUTROPHILS # BLD: 8.6 K/UL (ref 1.7–7.7)
NEUTROPHILS NFR BLD: 78.1 %
PERFORMED ON: ABNORMAL
PLATELET # BLD AUTO: 262 K/UL (ref 135–450)
PMV BLD AUTO: 8.8 FL (ref 5–10.5)
POTASSIUM SERPL-SCNC: 4.3 MMOL/L (ref 3.5–5.1)
RBC # BLD AUTO: 5.86 M/UL (ref 4–5.2)
SODIUM SERPL-SCNC: 134 MMOL/L (ref 136–145)
WBC # BLD AUTO: 11 K/UL (ref 4–11)

## 2025-05-04 PROCEDURE — 6360000002 HC RX W HCPCS: Performed by: NURSE PRACTITIONER

## 2025-05-04 PROCEDURE — 36415 COLL VENOUS BLD VENIPUNCTURE: CPT

## 2025-05-04 PROCEDURE — 1200000000 HC SEMI PRIVATE

## 2025-05-04 PROCEDURE — 94761 N-INVAS EAR/PLS OXIMETRY MLT: CPT

## 2025-05-04 PROCEDURE — 2500000003 HC RX 250 WO HCPCS: Performed by: NURSE PRACTITIONER

## 2025-05-04 PROCEDURE — 2700000000 HC OXYGEN THERAPY PER DAY

## 2025-05-04 PROCEDURE — 6370000000 HC RX 637 (ALT 250 FOR IP): Performed by: STUDENT IN AN ORGANIZED HEALTH CARE EDUCATION/TRAINING PROGRAM

## 2025-05-04 PROCEDURE — 94660 CPAP INITIATION&MGMT: CPT

## 2025-05-04 PROCEDURE — 2500000003 HC RX 250 WO HCPCS: Performed by: INTERNAL MEDICINE

## 2025-05-04 PROCEDURE — 6370000000 HC RX 637 (ALT 250 FOR IP): Performed by: HOSPITALIST

## 2025-05-04 PROCEDURE — 94640 AIRWAY INHALATION TREATMENT: CPT

## 2025-05-04 PROCEDURE — 6360000002 HC RX W HCPCS: Performed by: INTERNAL MEDICINE

## 2025-05-04 PROCEDURE — 6370000000 HC RX 637 (ALT 250 FOR IP): Performed by: INTERNAL MEDICINE

## 2025-05-04 PROCEDURE — 80048 BASIC METABOLIC PNL TOTAL CA: CPT

## 2025-05-04 PROCEDURE — 85025 COMPLETE CBC W/AUTO DIFF WBC: CPT

## 2025-05-04 RX ORDER — CETIRIZINE HYDROCHLORIDE 10 MG/1
5 TABLET ORAL DAILY
Status: DISCONTINUED | OUTPATIENT
Start: 2025-05-04 | End: 2025-05-07 | Stop reason: HOSPADM

## 2025-05-04 RX ORDER — ARFORMOTEROL TARTRATE 15 UG/2ML
15 SOLUTION RESPIRATORY (INHALATION)
Qty: 120 ML | Refills: 3 | Status: ON HOLD | OUTPATIENT
Start: 2025-05-04 | End: 2025-05-08 | Stop reason: CLARIF

## 2025-05-04 RX ORDER — BUDESONIDE 0.5 MG/2ML
0.5 INHALANT ORAL
Qty: 60 EACH | Refills: 3 | Status: ON HOLD | OUTPATIENT
Start: 2025-05-04 | End: 2025-05-08 | Stop reason: CLARIF

## 2025-05-04 RX ORDER — FUROSEMIDE 20 MG/1
20 TABLET ORAL DAILY
Qty: 60 TABLET | Refills: 3 | Status: ON HOLD | OUTPATIENT
Start: 2025-05-05 | End: 2025-05-08 | Stop reason: CLARIF

## 2025-05-04 RX ORDER — LEVOTHYROXINE SODIUM 112 UG/1
112 TABLET ORAL
Qty: 30 TABLET | Refills: 3 | Status: ON HOLD | OUTPATIENT
Start: 2025-05-05

## 2025-05-04 RX ORDER — DIAZEPAM 10 MG/2ML
2.5 INJECTION, SOLUTION INTRAMUSCULAR; INTRAVENOUS EVERY 6 HOURS PRN
Status: DISCONTINUED | OUTPATIENT
Start: 2025-05-04 | End: 2025-05-07 | Stop reason: HOSPADM

## 2025-05-04 RX ORDER — BENZOCAINE/MENTHOL 6 MG-10 MG
LOZENGE MUCOUS MEMBRANE 2 TIMES DAILY
Status: DISCONTINUED | OUTPATIENT
Start: 2025-05-04 | End: 2025-05-07 | Stop reason: HOSPADM

## 2025-05-04 RX ORDER — FLUCONAZOLE 100 MG/1
150 TABLET ORAL ONCE
Status: COMPLETED | OUTPATIENT
Start: 2025-05-04 | End: 2025-05-04

## 2025-05-04 RX ORDER — INSULIN LISPRO 100 [IU]/ML
10 INJECTION, SOLUTION INTRAVENOUS; SUBCUTANEOUS
Qty: 10 ML | Refills: 0 | Status: ON HOLD | OUTPATIENT
Start: 2025-05-04

## 2025-05-04 RX ORDER — PHENAZOPYRIDINE HYDROCHLORIDE 100 MG/1
200 TABLET, FILM COATED ORAL
Status: COMPLETED | OUTPATIENT
Start: 2025-05-04 | End: 2025-05-06

## 2025-05-04 RX ORDER — INSULIN LISPRO 100 [IU]/ML
INJECTION, SOLUTION INTRAVENOUS; SUBCUTANEOUS
Qty: 10 ML | Refills: 0 | Status: ON HOLD | OUTPATIENT
Start: 2025-05-04

## 2025-05-04 RX ADMIN — LEVOTHYROXINE SODIUM 112 MCG: 0.11 TABLET ORAL at 06:07

## 2025-05-04 RX ADMIN — GUAIFENESIN AND DEXTROMETHORPHAN 5 ML: 100; 10 SYRUP ORAL at 12:37

## 2025-05-04 RX ADMIN — GUAIFENESIN AND DEXTROMETHORPHAN 5 ML: 100; 10 SYRUP ORAL at 17:28

## 2025-05-04 RX ADMIN — MICONAZOLE NITRATE: 20 POWDER TOPICAL at 10:05

## 2025-05-04 RX ADMIN — INSULIN LISPRO 4 UNITS: 100 INJECTION, SOLUTION INTRAVENOUS; SUBCUTANEOUS at 17:28

## 2025-05-04 RX ADMIN — PHENAZOPYRIDINE 200 MG: 100 TABLET ORAL at 17:28

## 2025-05-04 RX ADMIN — BUDESONIDE 500 MCG: 0.5 INHALANT RESPIRATORY (INHALATION) at 19:31

## 2025-05-04 RX ADMIN — ENOXAPARIN SODIUM 40 MG: 100 INJECTION SUBCUTANEOUS at 08:21

## 2025-05-04 RX ADMIN — HYDROXYZINE PAMOATE 25 MG: 25 CAPSULE ORAL at 17:28

## 2025-05-04 RX ADMIN — DIAZEPAM 2.5 MG: 5 INJECTION, SOLUTION INTRAMUSCULAR; INTRAVENOUS at 00:10

## 2025-05-04 RX ADMIN — ARFORMOTEROL TARTRATE 15 MCG: 15 SOLUTION RESPIRATORY (INHALATION) at 09:03

## 2025-05-04 RX ADMIN — INSULIN LISPRO 10 UNITS: 100 INJECTION, SOLUTION INTRAVENOUS; SUBCUTANEOUS at 09:20

## 2025-05-04 RX ADMIN — IPRATROPIUM BROMIDE AND ALBUTEROL SULFATE 1 DOSE: .5; 3 SOLUTION RESPIRATORY (INHALATION) at 15:40

## 2025-05-04 RX ADMIN — PHENAZOPYRIDINE 200 MG: 100 TABLET ORAL at 12:37

## 2025-05-04 RX ADMIN — ARFORMOTEROL TARTRATE 15 MCG: 15 SOLUTION RESPIRATORY (INHALATION) at 19:32

## 2025-05-04 RX ADMIN — BUDESONIDE 500 MCG: 0.5 INHALANT RESPIRATORY (INHALATION) at 09:03

## 2025-05-04 RX ADMIN — IPRATROPIUM BROMIDE AND ALBUTEROL SULFATE 1 DOSE: .5; 3 SOLUTION RESPIRATORY (INHALATION) at 19:31

## 2025-05-04 RX ADMIN — Medication 10 ML: at 20:26

## 2025-05-04 RX ADMIN — FUROSEMIDE 20 MG: 20 TABLET ORAL at 08:21

## 2025-05-04 RX ADMIN — INSULIN LISPRO 4 UNITS: 100 INJECTION, SOLUTION INTRAVENOUS; SUBCUTANEOUS at 09:21

## 2025-05-04 RX ADMIN — INSULIN LISPRO 10 UNITS: 100 INJECTION, SOLUTION INTRAVENOUS; SUBCUTANEOUS at 12:38

## 2025-05-04 RX ADMIN — HYDROCORTISONE: 1 CREAM TOPICAL at 20:27

## 2025-05-04 RX ADMIN — INSULIN GLARGINE 18 UNITS: 100 INJECTION, SOLUTION SUBCUTANEOUS at 20:27

## 2025-05-04 RX ADMIN — CETIRIZINE HYDROCHLORIDE 5 MG: 10 TABLET, FILM COATED ORAL at 17:28

## 2025-05-04 RX ADMIN — INSULIN LISPRO 8 UNITS: 100 INJECTION, SOLUTION INTRAVENOUS; SUBCUTANEOUS at 20:27

## 2025-05-04 RX ADMIN — FLUCONAZOLE 150 MG: 100 TABLET ORAL at 09:20

## 2025-05-04 RX ADMIN — SODIUM CHLORIDE, PRESERVATIVE FREE 10 ML: 5 INJECTION INTRAVENOUS at 00:11

## 2025-05-04 RX ADMIN — PANTOPRAZOLE SODIUM 40 MG: 40 TABLET, DELAYED RELEASE ORAL at 06:07

## 2025-05-04 RX ADMIN — MICONAZOLE NITRATE: 20 POWDER TOPICAL at 20:27

## 2025-05-04 RX ADMIN — Medication 10 ML: at 08:21

## 2025-05-04 RX ADMIN — ENOXAPARIN SODIUM 40 MG: 100 INJECTION SUBCUTANEOUS at 20:28

## 2025-05-04 RX ADMIN — IPRATROPIUM BROMIDE AND ALBUTEROL SULFATE 1 DOSE: .5; 3 SOLUTION RESPIRATORY (INHALATION) at 09:03

## 2025-05-04 RX ADMIN — INSULIN LISPRO 4 UNITS: 100 INJECTION, SOLUTION INTRAVENOUS; SUBCUTANEOUS at 12:38

## 2025-05-04 RX ADMIN — INSULIN LISPRO 10 UNITS: 100 INJECTION, SOLUTION INTRAVENOUS; SUBCUTANEOUS at 17:28

## 2025-05-04 RX ADMIN — GUAIFENESIN AND DEXTROMETHORPHAN 5 ML: 100; 10 SYRUP ORAL at 00:11

## 2025-05-04 RX ADMIN — INSULIN GLARGINE 18 UNITS: 100 INJECTION, SOLUTION SUBCUTANEOUS at 08:21

## 2025-05-04 ASSESSMENT — PAIN SCALES - GENERAL: PAINLEVEL_OUTOF10: 5

## 2025-05-04 ASSESSMENT — PAIN DESCRIPTION - LOCATION: LOCATION: VAGINA;VULVA

## 2025-05-04 NOTE — DISCHARGE INSTR - COC
Continuity of Care Form    Patient Name: Jaden Denise   :  1965  MRN:  [id number]    Admit date:  2025  Discharge date:  ***    Code Status Order: Full Code   Advance Directives:     Admitting Physician:  Humberto Sanford MD  PCP: No primary care provider on file.    Discharging Nurse: ***  Discharging Hospital Unit/Room#: 3TN-3359/3359-01  Discharging Unit Phone Number: ***    Emergency Contact:   Extended Emergency Contact Information  Primary Emergency Contact: Chapito Denise  Home Phone: [phone]  Relation: Child    Past Surgical History:  Past Surgical History:   Procedure Laterality Date    ENDOMETRIAL ABLATION      x 2    OVARY REMOVAL Left     SPINAL FUSION         Immunization History:   Immunization History   Administered Date(s) Administered    COVID-19, PFIZER Bivalent, DO NOT Dilute, (age 12y+), IM, 30 mcg/0.3 mL 2022    COVID-19, PFIZER PURPLE top, DILUTE for use, (age 12 y+), 30mcg/0.3mL 2021, 2021, 2021       Active Problems:  Patient Active Problem List   Diagnosis Code    Bilateral edema of lower extremity R60.0    Chronic low back pain M54.50, G89.29    Chronic shoulder pain M25.519, G89.29    GERD (gastroesophageal reflux disease) K21.9    Mixed anxiety and depressive disorder F41.8    Mixed hyperlipidemia E78.2    S/P lumbar fusion Z98.1    Pneumonia due to infectious agent J18.9       Isolation/Infection:   Isolation            No Isolation          Patient Infection Status    None to display              Nurse Assessment:  Last Vital Signs: /82   Pulse (!) 103   Temp 98 °F (36.7 °C) (Temporal)   Resp 20   Ht 1.702 m (5' 7\")   Wt (!) 146.2 kg (322 lb 4.8 oz)   SpO2 90%   BMI 50.48 kg/m²     Last documented pain score (0-10 scale): Pain Level: 4  Last Weight:   Wt Readings from Last 1 Encounters:   25 (!) 146.2 kg (322 lb 4.8 oz)     Mental Status:  {IP PT MENTAL STATUS:}    IV Access:  { FRANCK IV ACCESS:[id number]}    Nursing

## 2025-05-04 NOTE — CARE COORDINATION
Discharge Planning Note:    Pre-cert pending for [facility]  Started on 5/2/25.  .Electronically signed by LIMA Potter on 5/4/25 at 12:40 PM EDT

## 2025-05-04 NOTE — RT PROTOCOL NOTE
RT Inhaler-Nebulizer Bronchodilator Protocol Note       05/04/25 1732   RT Protocol   History Pulmonary Disease 2   Respiratory pattern 2   Breath sounds 4   Cough 0   Bronchodilator Assessment Score 8     There is a bronchodilator order in the chart from a provider indicating to follow the RT Bronchodilator Protocol and there is an “Initiate RT Inhaler-Nebulizer Bronchodilator Protocol” order as well (see protocol at bottom of note).    CXR Findings:  No results found.    The findings from the last RT Protocol Assessment were as follows:   History Pulmonary Disease: Chronic pulmonary disease  Respiratory Pattern: Dyspnea on exertion or RR 21-25 bpm  Breath Sounds: Intermittent or unilateral wheezes  Cough: Strong, spontaneous, non-productive  Indication for Bronchodilator Therapy: On home bronchodilators  Bronchodilator Assessment Score: 8      Respiratory Therapist to perform RT Therapy Protocol Assessment initially then follow the protocol.  Repeat RT Therapy Protocol Assessment PRN for score 0-3 or on second treatment, BID, and PRN for scores above 3.    No Indications - adjust the frequency to every 6 hours PRN wheezing or bronchospasm, if no treatments needed after 48 hours then discontinue using Per Protocol order mode.     If indication present, adjust the RT bronchodilator orders based on the Bronchodilator Assessment Score as indicated below.  Use Inhaler orders unless patient has one or more of the following: on home nebulizer, not able to hold breath for 10 seconds, is not alert and oriented, cannot activate and use MDI correctly, or respiratory rate 25 breaths per minute or more, then use the equivalent nebulizer order(s) with same Frequency and PRN reasons based on the score.  If a patient is on this medication at home then do not decrease Frequency below that used at home.    0-3 - enter or revise RT bronchodilator order(s) to equivalent RT Bronchodilator order with Frequency of every 4 hours PRN for

## 2025-05-05 LAB
ALDOST SERPL-MCNC: 7.7 NG/DL
ALDOST/RENIN PLAS-RTO: 6.4 RATIO
ANION GAP SERPL CALCULATED.3IONS-SCNC: 7 MMOL/L (ref 3–16)
BASOPHILS # BLD: 0.1 K/UL (ref 0–0.2)
BASOPHILS NFR BLD: 1 %
BUN SERPL-MCNC: 13 MG/DL (ref 7–20)
CALCIUM SERPL-MCNC: 8.7 MG/DL (ref 8.3–10.6)
CHLORIDE SERPL-SCNC: 101 MMOL/L (ref 99–110)
CO2 SERPL-SCNC: 27 MMOL/L (ref 21–32)
CREAT SERPL-MCNC: 0.8 MG/DL (ref 0.6–1.1)
DEPRECATED RDW RBC AUTO: 18 % (ref 12.4–15.4)
EOSINOPHIL # BLD: 0.1 K/UL (ref 0–0.6)
EOSINOPHIL NFR BLD: 0.7 %
GFR SERPLBLD CREATININE-BSD FMLA CKD-EPI: 85 ML/MIN/{1.73_M2}
GLUCOSE BLD-MCNC: 168 MG/DL (ref 70–99)
GLUCOSE BLD-MCNC: 187 MG/DL (ref 70–99)
GLUCOSE BLD-MCNC: 220 MG/DL (ref 70–99)
GLUCOSE BLD-MCNC: 280 MG/DL (ref 70–99)
GLUCOSE SERPL-MCNC: 193 MG/DL (ref 70–99)
HCT VFR BLD AUTO: 46.1 % (ref 36–48)
HGB BLD-MCNC: 15.1 G/DL (ref 12–16)
LYMPHOCYTES # BLD: 1.2 K/UL (ref 1–5.1)
LYMPHOCYTES NFR BLD: 14.7 %
MCH RBC QN AUTO: 26.8 PG (ref 26–34)
MCHC RBC AUTO-ENTMCNC: 32.8 G/DL (ref 31–36)
MCV RBC AUTO: 81.7 FL (ref 80–100)
MONOCYTES # BLD: 0.9 K/UL (ref 0–1.3)
MONOCYTES NFR BLD: 11 %
NEUTROPHILS # BLD: 6.1 K/UL (ref 1.7–7.7)
NEUTROPHILS NFR BLD: 72.6 %
PERFORMED ON: ABNORMAL
PLATELET # BLD AUTO: 221 K/UL (ref 135–450)
PMV BLD AUTO: 8.7 FL (ref 5–10.5)
POTASSIUM SERPL-SCNC: 4.7 MMOL/L (ref 3.5–5.1)
RBC # BLD AUTO: 5.64 M/UL (ref 4–5.2)
RENIN PLAS-CCNC: 1.2 NG/ML/HR
SODIUM SERPL-SCNC: 135 MMOL/L (ref 136–145)
WBC # BLD AUTO: 8.3 K/UL (ref 4–11)

## 2025-05-05 PROCEDURE — 6370000000 HC RX 637 (ALT 250 FOR IP): Performed by: STUDENT IN AN ORGANIZED HEALTH CARE EDUCATION/TRAINING PROGRAM

## 2025-05-05 PROCEDURE — 6360000002 HC RX W HCPCS: Performed by: INTERNAL MEDICINE

## 2025-05-05 PROCEDURE — 2700000000 HC OXYGEN THERAPY PER DAY

## 2025-05-05 PROCEDURE — 36415 COLL VENOUS BLD VENIPUNCTURE: CPT

## 2025-05-05 PROCEDURE — 94640 AIRWAY INHALATION TREATMENT: CPT

## 2025-05-05 PROCEDURE — 6370000000 HC RX 637 (ALT 250 FOR IP): Performed by: INTERNAL MEDICINE

## 2025-05-05 PROCEDURE — 6370000000 HC RX 637 (ALT 250 FOR IP): Performed by: HOSPITALIST

## 2025-05-05 PROCEDURE — 2500000003 HC RX 250 WO HCPCS: Performed by: INTERNAL MEDICINE

## 2025-05-05 PROCEDURE — 94761 N-INVAS EAR/PLS OXIMETRY MLT: CPT

## 2025-05-05 PROCEDURE — 1200000000 HC SEMI PRIVATE

## 2025-05-05 PROCEDURE — 80048 BASIC METABOLIC PNL TOTAL CA: CPT

## 2025-05-05 PROCEDURE — 6360000002 HC RX W HCPCS: Performed by: NURSE PRACTITIONER

## 2025-05-05 PROCEDURE — 94660 CPAP INITIATION&MGMT: CPT

## 2025-05-05 PROCEDURE — 85025 COMPLETE CBC W/AUTO DIFF WBC: CPT

## 2025-05-05 RX ADMIN — INSULIN LISPRO 10 UNITS: 100 INJECTION, SOLUTION INTRAVENOUS; SUBCUTANEOUS at 09:52

## 2025-05-05 RX ADMIN — INSULIN LISPRO 8 UNITS: 100 INJECTION, SOLUTION INTRAVENOUS; SUBCUTANEOUS at 17:17

## 2025-05-05 RX ADMIN — MICONAZOLE NITRATE: 20 POWDER TOPICAL at 09:57

## 2025-05-05 RX ADMIN — ARFORMOTEROL TARTRATE 15 MCG: 15 SOLUTION RESPIRATORY (INHALATION) at 08:16

## 2025-05-05 RX ADMIN — ENOXAPARIN SODIUM 40 MG: 100 INJECTION SUBCUTANEOUS at 20:50

## 2025-05-05 RX ADMIN — GUAIFENESIN AND DEXTROMETHORPHAN 5 ML: 100; 10 SYRUP ORAL at 00:10

## 2025-05-05 RX ADMIN — IPRATROPIUM BROMIDE AND ALBUTEROL SULFATE 1 DOSE: .5; 3 SOLUTION RESPIRATORY (INHALATION) at 08:16

## 2025-05-05 RX ADMIN — GUAIFENESIN AND DEXTROMETHORPHAN 5 ML: 100; 10 SYRUP ORAL at 20:51

## 2025-05-05 RX ADMIN — INSULIN LISPRO 10 UNITS: 100 INJECTION, SOLUTION INTRAVENOUS; SUBCUTANEOUS at 17:17

## 2025-05-05 RX ADMIN — FUROSEMIDE 20 MG: 20 TABLET ORAL at 09:51

## 2025-05-05 RX ADMIN — INSULIN LISPRO 10 UNITS: 100 INJECTION, SOLUTION INTRAVENOUS; SUBCUTANEOUS at 12:09

## 2025-05-05 RX ADMIN — PHENAZOPYRIDINE 200 MG: 100 TABLET ORAL at 17:18

## 2025-05-05 RX ADMIN — CETIRIZINE HYDROCHLORIDE 5 MG: 10 TABLET, FILM COATED ORAL at 09:51

## 2025-05-05 RX ADMIN — INSULIN LISPRO 4 UNITS: 100 INJECTION, SOLUTION INTRAVENOUS; SUBCUTANEOUS at 12:10

## 2025-05-05 RX ADMIN — BUDESONIDE 500 MCG: 0.5 INHALANT RESPIRATORY (INHALATION) at 21:03

## 2025-05-05 RX ADMIN — DIAZEPAM 2.5 MG: 5 INJECTION, SOLUTION INTRAMUSCULAR; INTRAVENOUS at 00:39

## 2025-05-05 RX ADMIN — Medication 10 ML: at 20:52

## 2025-05-05 RX ADMIN — GUAIFENESIN AND DEXTROMETHORPHAN 5 ML: 100; 10 SYRUP ORAL at 12:10

## 2025-05-05 RX ADMIN — IPRATROPIUM BROMIDE AND ALBUTEROL SULFATE 1 DOSE: .5; 3 SOLUTION RESPIRATORY (INHALATION) at 15:08

## 2025-05-05 RX ADMIN — LEVOTHYROXINE SODIUM 112 MCG: 0.11 TABLET ORAL at 06:52

## 2025-05-05 RX ADMIN — HYDROCORTISONE: 1 CREAM TOPICAL at 09:57

## 2025-05-05 RX ADMIN — ARFORMOTEROL TARTRATE 15 MCG: 15 SOLUTION RESPIRATORY (INHALATION) at 21:03

## 2025-05-05 RX ADMIN — Medication 10 ML: at 09:53

## 2025-05-05 RX ADMIN — GUAIFENESIN AND DEXTROMETHORPHAN 5 ML: 100; 10 SYRUP ORAL at 06:51

## 2025-05-05 RX ADMIN — GUAIFENESIN AND DEXTROMETHORPHAN 5 ML: 100; 10 SYRUP ORAL at 17:18

## 2025-05-05 RX ADMIN — PHENAZOPYRIDINE 200 MG: 100 TABLET ORAL at 09:51

## 2025-05-05 RX ADMIN — HYDROXYZINE PAMOATE 25 MG: 25 CAPSULE ORAL at 20:52

## 2025-05-05 RX ADMIN — HYDROCORTISONE: 1 CREAM TOPICAL at 20:52

## 2025-05-05 RX ADMIN — INSULIN GLARGINE 18 UNITS: 100 INJECTION, SOLUTION SUBCUTANEOUS at 20:50

## 2025-05-05 RX ADMIN — INSULIN LISPRO 4 UNITS: 100 INJECTION, SOLUTION INTRAVENOUS; SUBCUTANEOUS at 21:21

## 2025-05-05 RX ADMIN — PANTOPRAZOLE SODIUM 40 MG: 40 TABLET, DELAYED RELEASE ORAL at 06:52

## 2025-05-05 RX ADMIN — PHENAZOPYRIDINE 200 MG: 100 TABLET ORAL at 12:10

## 2025-05-05 RX ADMIN — BUDESONIDE 500 MCG: 0.5 INHALANT RESPIRATORY (INHALATION) at 08:16

## 2025-05-05 RX ADMIN — ONDANSETRON 4 MG: 2 INJECTION, SOLUTION INTRAMUSCULAR; INTRAVENOUS at 00:10

## 2025-05-05 RX ADMIN — MICONAZOLE NITRATE: 20 POWDER TOPICAL at 20:54

## 2025-05-05 RX ADMIN — ENOXAPARIN SODIUM 40 MG: 100 INJECTION SUBCUTANEOUS at 09:51

## 2025-05-05 RX ADMIN — IPRATROPIUM BROMIDE AND ALBUTEROL SULFATE 1 DOSE: .5; 3 SOLUTION RESPIRATORY (INHALATION) at 21:03

## 2025-05-05 RX ADMIN — INSULIN GLARGINE 18 UNITS: 100 INJECTION, SOLUTION SUBCUTANEOUS at 09:53

## 2025-05-05 ASSESSMENT — PAIN SCALES - GENERAL
PAINLEVEL_OUTOF10: 2
PAINLEVEL_OUTOF10: 0

## 2025-05-06 PROBLEM — L50.9 URTICARIAL RASH: Status: ACTIVE | Noted: 2025-05-06

## 2025-05-06 PROBLEM — J44.1 COPD EXACERBATION (HCC): Status: ACTIVE | Noted: 2025-05-06

## 2025-05-06 LAB
ANION GAP SERPL CALCULATED.3IONS-SCNC: 9 MMOL/L (ref 3–16)
BASOPHILS # BLD: 0 K/UL (ref 0–0.2)
BASOPHILS NFR BLD: 0.6 %
BUN SERPL-MCNC: 10 MG/DL (ref 7–20)
CALCIUM SERPL-MCNC: 8.8 MG/DL (ref 8.3–10.6)
CHLORIDE SERPL-SCNC: 99 MMOL/L (ref 99–110)
CO2 SERPL-SCNC: 27 MMOL/L (ref 21–32)
CORTIS AM PEAK SERPL-MCNC: 13.5 UG/DL (ref 4.3–22.4)
CREAT SERPL-MCNC: 0.7 MG/DL (ref 0.6–1.1)
DEPRECATED RDW RBC AUTO: 18.3 % (ref 12.4–15.4)
EOSINOPHIL # BLD: 0.1 K/UL (ref 0–0.6)
EOSINOPHIL NFR BLD: 1.1 %
GFR SERPLBLD CREATININE-BSD FMLA CKD-EPI: >90 ML/MIN/{1.73_M2}
GLUCOSE BLD-MCNC: 220 MG/DL (ref 70–99)
GLUCOSE BLD-MCNC: 229 MG/DL (ref 70–99)
GLUCOSE BLD-MCNC: 245 MG/DL (ref 70–99)
GLUCOSE BLD-MCNC: 350 MG/DL (ref 70–99)
GLUCOSE SERPL-MCNC: 263 MG/DL (ref 70–99)
HCT VFR BLD AUTO: 48.1 % (ref 36–48)
HGB BLD-MCNC: 15.7 G/DL (ref 12–16)
LYMPHOCYTES # BLD: 1.3 K/UL (ref 1–5.1)
LYMPHOCYTES NFR BLD: 17.1 %
MCH RBC QN AUTO: 26.8 PG (ref 26–34)
MCHC RBC AUTO-ENTMCNC: 32.7 G/DL (ref 31–36)
MCV RBC AUTO: 82 FL (ref 80–100)
MONOCYTES # BLD: 1 K/UL (ref 0–1.3)
MONOCYTES NFR BLD: 13.8 %
NEUTROPHILS # BLD: 5 K/UL (ref 1.7–7.7)
NEUTROPHILS NFR BLD: 67.4 %
PERFORMED ON: ABNORMAL
PLATELET # BLD AUTO: 215 K/UL (ref 135–450)
PMV BLD AUTO: 8.8 FL (ref 5–10.5)
POTASSIUM SERPL-SCNC: 4.8 MMOL/L (ref 3.5–5.1)
RBC # BLD AUTO: 5.87 M/UL (ref 4–5.2)
SODIUM SERPL-SCNC: 135 MMOL/L (ref 136–145)
WBC # BLD AUTO: 7.4 K/UL (ref 4–11)

## 2025-05-06 PROCEDURE — 97530 THERAPEUTIC ACTIVITIES: CPT

## 2025-05-06 PROCEDURE — 6370000000 HC RX 637 (ALT 250 FOR IP): Performed by: INTERNAL MEDICINE

## 2025-05-06 PROCEDURE — 94761 N-INVAS EAR/PLS OXIMETRY MLT: CPT

## 2025-05-06 PROCEDURE — 97110 THERAPEUTIC EXERCISES: CPT

## 2025-05-06 PROCEDURE — 82533 TOTAL CORTISOL: CPT

## 2025-05-06 PROCEDURE — 2500000003 HC RX 250 WO HCPCS: Performed by: INTERNAL MEDICINE

## 2025-05-06 PROCEDURE — 1200000000 HC SEMI PRIVATE

## 2025-05-06 PROCEDURE — 97116 GAIT TRAINING THERAPY: CPT

## 2025-05-06 PROCEDURE — 36415 COLL VENOUS BLD VENIPUNCTURE: CPT

## 2025-05-06 PROCEDURE — 94640 AIRWAY INHALATION TREATMENT: CPT

## 2025-05-06 PROCEDURE — 85025 COMPLETE CBC W/AUTO DIFF WBC: CPT

## 2025-05-06 PROCEDURE — 97535 SELF CARE MNGMENT TRAINING: CPT

## 2025-05-06 PROCEDURE — 6360000002 HC RX W HCPCS: Performed by: INTERNAL MEDICINE

## 2025-05-06 PROCEDURE — 6370000000 HC RX 637 (ALT 250 FOR IP): Performed by: HOSPITALIST

## 2025-05-06 PROCEDURE — 80048 BASIC METABOLIC PNL TOTAL CA: CPT

## 2025-05-06 PROCEDURE — 6370000000 HC RX 637 (ALT 250 FOR IP): Performed by: STUDENT IN AN ORGANIZED HEALTH CARE EDUCATION/TRAINING PROGRAM

## 2025-05-06 PROCEDURE — 2700000000 HC OXYGEN THERAPY PER DAY

## 2025-05-06 RX ORDER — FAMOTIDINE 20 MG/1
20 TABLET, FILM COATED ORAL 2 TIMES DAILY
Status: DISCONTINUED | OUTPATIENT
Start: 2025-05-06 | End: 2025-05-07 | Stop reason: HOSPADM

## 2025-05-06 RX ORDER — DIPHENHYDRAMINE HCL 25 MG
50 TABLET ORAL EVERY 6 HOURS PRN
Status: DISCONTINUED | OUTPATIENT
Start: 2025-05-06 | End: 2025-05-07 | Stop reason: HOSPADM

## 2025-05-06 RX ADMIN — LEVOTHYROXINE SODIUM 112 MCG: 0.11 TABLET ORAL at 06:34

## 2025-05-06 RX ADMIN — ENOXAPARIN SODIUM 40 MG: 100 INJECTION SUBCUTANEOUS at 08:24

## 2025-05-06 RX ADMIN — FAMOTIDINE 20 MG: 20 TABLET, FILM COATED ORAL at 12:55

## 2025-05-06 RX ADMIN — PANTOPRAZOLE SODIUM 40 MG: 40 TABLET, DELAYED RELEASE ORAL at 06:34

## 2025-05-06 RX ADMIN — INSULIN LISPRO 4 UNITS: 100 INJECTION, SOLUTION INTRAVENOUS; SUBCUTANEOUS at 18:01

## 2025-05-06 RX ADMIN — IPRATROPIUM BROMIDE AND ALBUTEROL SULFATE 1 DOSE: .5; 3 SOLUTION RESPIRATORY (INHALATION) at 08:17

## 2025-05-06 RX ADMIN — PREDNISONE 30 MG: 20 TABLET ORAL at 12:55

## 2025-05-06 RX ADMIN — GUAIFENESIN AND DEXTROMETHORPHAN 5 ML: 100; 10 SYRUP ORAL at 18:01

## 2025-05-06 RX ADMIN — INSULIN LISPRO 4 UNITS: 100 INJECTION, SOLUTION INTRAVENOUS; SUBCUTANEOUS at 11:36

## 2025-05-06 RX ADMIN — MICONAZOLE NITRATE: 20 POWDER TOPICAL at 21:23

## 2025-05-06 RX ADMIN — Medication 10 ML: at 08:24

## 2025-05-06 RX ADMIN — MICONAZOLE NITRATE: 20 POWDER TOPICAL at 08:19

## 2025-05-06 RX ADMIN — INSULIN LISPRO 10 UNITS: 100 INJECTION, SOLUTION INTRAVENOUS; SUBCUTANEOUS at 11:36

## 2025-05-06 RX ADMIN — INSULIN LISPRO 10 UNITS: 100 INJECTION, SOLUTION INTRAVENOUS; SUBCUTANEOUS at 08:16

## 2025-05-06 RX ADMIN — IPRATROPIUM BROMIDE AND ALBUTEROL SULFATE 1 DOSE: .5; 3 SOLUTION RESPIRATORY (INHALATION) at 21:53

## 2025-05-06 RX ADMIN — GUAIFENESIN AND DEXTROMETHORPHAN 5 ML: 100; 10 SYRUP ORAL at 11:36

## 2025-05-06 RX ADMIN — GUAIFENESIN AND DEXTROMETHORPHAN 5 ML: 100; 10 SYRUP ORAL at 23:36

## 2025-05-06 RX ADMIN — DIPHENHYDRAMINE HYDROCHLORIDE 50 MG: 25 TABLET ORAL at 16:13

## 2025-05-06 RX ADMIN — FUROSEMIDE 20 MG: 20 TABLET ORAL at 08:17

## 2025-05-06 RX ADMIN — GUAIFENESIN AND DEXTROMETHORPHAN 5 ML: 100; 10 SYRUP ORAL at 06:33

## 2025-05-06 RX ADMIN — BUDESONIDE 500 MCG: 0.5 INHALANT RESPIRATORY (INHALATION) at 21:53

## 2025-05-06 RX ADMIN — CETIRIZINE HYDROCHLORIDE 5 MG: 10 TABLET, FILM COATED ORAL at 08:17

## 2025-05-06 RX ADMIN — INSULIN GLARGINE 18 UNITS: 100 INJECTION, SOLUTION SUBCUTANEOUS at 21:18

## 2025-05-06 RX ADMIN — INSULIN GLARGINE 18 UNITS: 100 INJECTION, SOLUTION SUBCUTANEOUS at 08:15

## 2025-05-06 RX ADMIN — DIPHENHYDRAMINE HYDROCHLORIDE 50 MG: 25 TABLET ORAL at 23:36

## 2025-05-06 RX ADMIN — HYDROCORTISONE: 1 CREAM TOPICAL at 21:23

## 2025-05-06 RX ADMIN — FAMOTIDINE 20 MG: 20 TABLET, FILM COATED ORAL at 21:23

## 2025-05-06 RX ADMIN — Medication 10 ML: at 21:23

## 2025-05-06 RX ADMIN — HYDROCORTISONE: 1 CREAM TOPICAL at 08:19

## 2025-05-06 RX ADMIN — PHENAZOPYRIDINE 200 MG: 100 TABLET ORAL at 08:16

## 2025-05-06 RX ADMIN — INSULIN LISPRO 16 UNITS: 100 INJECTION, SOLUTION INTRAVENOUS; SUBCUTANEOUS at 21:18

## 2025-05-06 RX ADMIN — ARFORMOTEROL TARTRATE 15 MCG: 15 SOLUTION RESPIRATORY (INHALATION) at 08:17

## 2025-05-06 RX ADMIN — ARFORMOTEROL TARTRATE 15 MCG: 15 SOLUTION RESPIRATORY (INHALATION) at 21:53

## 2025-05-06 RX ADMIN — BUDESONIDE 500 MCG: 0.5 INHALANT RESPIRATORY (INHALATION) at 08:17

## 2025-05-06 RX ADMIN — ENOXAPARIN SODIUM 40 MG: 100 INJECTION SUBCUTANEOUS at 21:20

## 2025-05-06 RX ADMIN — INSULIN LISPRO 10 UNITS: 100 INJECTION, SOLUTION INTRAVENOUS; SUBCUTANEOUS at 18:01

## 2025-05-06 RX ADMIN — INSULIN LISPRO 4 UNITS: 100 INJECTION, SOLUTION INTRAVENOUS; SUBCUTANEOUS at 08:16

## 2025-05-06 ASSESSMENT — PAIN SCALES - GENERAL: PAINLEVEL_OUTOF10: 0

## 2025-05-06 NOTE — CARE COORDINATION
CM left  for [facility] liaison [id number] checking on status of pre cert.    Niharika Day RN, BSN  [phone]     Updated at 8:54 AM     Pre cert still pending.     Niharika Day RN, BSN  [phone]

## 2025-05-07 ENCOUNTER — HOSPITAL ENCOUNTER (INPATIENT)
Age: 60
LOS: 9 days | Discharge: HOME HEALTH | DRG: 947 | End: 2025-05-16
Attending: STUDENT IN AN ORGANIZED HEALTH CARE EDUCATION/TRAINING PROGRAM | Admitting: STUDENT IN AN ORGANIZED HEALTH CARE EDUCATION/TRAINING PROGRAM
Payer: OTHER GOVERNMENT

## 2025-05-07 VITALS
OXYGEN SATURATION: 93 % | DIASTOLIC BLOOD PRESSURE: 67 MMHG | HEIGHT: 67 IN | WEIGHT: 293 LBS | SYSTOLIC BLOOD PRESSURE: 121 MMHG | RESPIRATION RATE: 16 BRPM | TEMPERATURE: 97.9 F | BODY MASS INDEX: 45.99 KG/M2 | HEART RATE: 93 BPM

## 2025-05-07 PROBLEM — J96.01 ACUTE RESPIRATORY FAILURE WITH HYPOXIA (HCC): Status: ACTIVE | Noted: 2025-05-07

## 2025-05-07 LAB
GLUCOSE BLD-MCNC: 228 MG/DL (ref 70–99)
GLUCOSE BLD-MCNC: 247 MG/DL (ref 70–99)
GLUCOSE BLD-MCNC: 295 MG/DL (ref 70–99)
GLUCOSE BLD-MCNC: 399 MG/DL (ref 70–99)
PERFORMED ON: ABNORMAL

## 2025-05-07 PROCEDURE — 6360000002 HC RX W HCPCS: Performed by: STUDENT IN AN ORGANIZED HEALTH CARE EDUCATION/TRAINING PROGRAM

## 2025-05-07 PROCEDURE — 2700000000 HC OXYGEN THERAPY PER DAY

## 2025-05-07 PROCEDURE — 6370000000 HC RX 637 (ALT 250 FOR IP): Performed by: STUDENT IN AN ORGANIZED HEALTH CARE EDUCATION/TRAINING PROGRAM

## 2025-05-07 PROCEDURE — 94640 AIRWAY INHALATION TREATMENT: CPT

## 2025-05-07 PROCEDURE — 97116 GAIT TRAINING THERAPY: CPT

## 2025-05-07 PROCEDURE — 6370000000 HC RX 637 (ALT 250 FOR IP): Performed by: INTERNAL MEDICINE

## 2025-05-07 PROCEDURE — 6360000002 HC RX W HCPCS: Performed by: INTERNAL MEDICINE

## 2025-05-07 PROCEDURE — 1280000000 HC REHAB R&B

## 2025-05-07 PROCEDURE — 5A09457 ASSISTANCE WITH RESPIRATORY VENTILATION, 24-96 CONSECUTIVE HOURS, CONTINUOUS POSITIVE AIRWAY PRESSURE: ICD-10-PCS | Performed by: STUDENT IN AN ORGANIZED HEALTH CARE EDUCATION/TRAINING PROGRAM

## 2025-05-07 PROCEDURE — 97110 THERAPEUTIC EXERCISES: CPT

## 2025-05-07 PROCEDURE — 97530 THERAPEUTIC ACTIVITIES: CPT

## 2025-05-07 PROCEDURE — 94660 CPAP INITIATION&MGMT: CPT

## 2025-05-07 PROCEDURE — 94761 N-INVAS EAR/PLS OXIMETRY MLT: CPT

## 2025-05-07 RX ORDER — DIPHENHYDRAMINE HCL 25 MG
50 TABLET ORAL EVERY 6 HOURS PRN
Status: CANCELLED | OUTPATIENT
Start: 2025-05-07

## 2025-05-07 RX ORDER — GUAIFENESIN/DEXTROMETHORPHAN 100-10MG/5
5 SYRUP ORAL EVERY 6 HOURS
Status: DISCONTINUED | OUTPATIENT
Start: 2025-05-08 | End: 2025-05-16 | Stop reason: HOSPADM

## 2025-05-07 RX ORDER — FAMOTIDINE 20 MG/1
20 TABLET, FILM COATED ORAL 2 TIMES DAILY
Status: DISCONTINUED | OUTPATIENT
Start: 2025-05-07 | End: 2025-05-16 | Stop reason: HOSPADM

## 2025-05-07 RX ORDER — ARFORMOTEROL TARTRATE 15 UG/2ML
15 SOLUTION RESPIRATORY (INHALATION)
Status: DISCONTINUED | OUTPATIENT
Start: 2025-05-07 | End: 2025-05-16 | Stop reason: HOSPADM

## 2025-05-07 RX ORDER — ACETAMINOPHEN 325 MG/1
650 TABLET ORAL EVERY 4 HOURS PRN
Status: DISCONTINUED | OUTPATIENT
Start: 2025-05-07 | End: 2025-05-16 | Stop reason: HOSPADM

## 2025-05-07 RX ORDER — INSULIN LISPRO 100 [IU]/ML
10 INJECTION, SOLUTION INTRAVENOUS; SUBCUTANEOUS
Status: CANCELLED | OUTPATIENT
Start: 2025-05-07

## 2025-05-07 RX ORDER — POLYETHYLENE GLYCOL 3350 17 G/17G
17 POWDER, FOR SOLUTION ORAL DAILY PRN
Status: CANCELLED | OUTPATIENT
Start: 2025-05-07

## 2025-05-07 RX ORDER — INSULIN GLARGINE 100 [IU]/ML
18 INJECTION, SOLUTION SUBCUTANEOUS 2 TIMES DAILY
Status: DISCONTINUED | OUTPATIENT
Start: 2025-05-07 | End: 2025-05-09

## 2025-05-07 RX ORDER — INSULIN LISPRO 100 [IU]/ML
0-16 INJECTION, SOLUTION INTRAVENOUS; SUBCUTANEOUS
Status: CANCELLED | OUTPATIENT
Start: 2025-05-07

## 2025-05-07 RX ORDER — SENNOSIDES A AND B 8.6 MG/1
2 TABLET, FILM COATED ORAL DAILY PRN
Status: DISCONTINUED | OUTPATIENT
Start: 2025-05-07 | End: 2025-05-16 | Stop reason: HOSPADM

## 2025-05-07 RX ORDER — PREDNISONE 10 MG/1
30 TABLET ORAL DAILY
Qty: 15 TABLET | Refills: 0 | Status: ON HOLD | OUTPATIENT
Start: 2025-05-08 | End: 2025-05-13

## 2025-05-07 RX ORDER — IPRATROPIUM BROMIDE AND ALBUTEROL SULFATE 2.5; .5 MG/3ML; MG/3ML
1 SOLUTION RESPIRATORY (INHALATION)
Status: CANCELLED | OUTPATIENT
Start: 2025-05-07

## 2025-05-07 RX ORDER — IPRATROPIUM BROMIDE AND ALBUTEROL SULFATE 2.5; .5 MG/3ML; MG/3ML
1 SOLUTION RESPIRATORY (INHALATION) EVERY 4 HOURS PRN
Status: CANCELLED | OUTPATIENT
Start: 2025-05-07

## 2025-05-07 RX ORDER — FUROSEMIDE 20 MG/1
20 TABLET ORAL DAILY
Status: CANCELLED | OUTPATIENT
Start: 2025-05-08

## 2025-05-07 RX ORDER — LEVOTHYROXINE SODIUM 112 UG/1
112 TABLET ORAL
Status: CANCELLED | OUTPATIENT
Start: 2025-05-08

## 2025-05-07 RX ORDER — BUDESONIDE 0.5 MG/2ML
0.5 INHALANT ORAL
Status: DISCONTINUED | OUTPATIENT
Start: 2025-05-07 | End: 2025-05-16 | Stop reason: HOSPADM

## 2025-05-07 RX ORDER — SENNOSIDES A AND B 8.6 MG/1
2 TABLET, FILM COATED ORAL DAILY PRN
Status: CANCELLED | OUTPATIENT
Start: 2025-05-07

## 2025-05-07 RX ORDER — BUDESONIDE 0.5 MG/2ML
0.5 INHALANT ORAL
Status: CANCELLED | OUTPATIENT
Start: 2025-05-07

## 2025-05-07 RX ORDER — ONDANSETRON 4 MG/1
4 TABLET, ORALLY DISINTEGRATING ORAL EVERY 8 HOURS PRN
Status: CANCELLED | OUTPATIENT
Start: 2025-05-07

## 2025-05-07 RX ORDER — IPRATROPIUM BROMIDE AND ALBUTEROL SULFATE 2.5; .5 MG/3ML; MG/3ML
1 SOLUTION RESPIRATORY (INHALATION)
Status: DISCONTINUED | OUTPATIENT
Start: 2025-05-08 | End: 2025-05-07 | Stop reason: HOSPADM

## 2025-05-07 RX ORDER — IPRATROPIUM BROMIDE AND ALBUTEROL SULFATE 2.5; .5 MG/3ML; MG/3ML
1 SOLUTION RESPIRATORY (INHALATION) EVERY 4 HOURS PRN
Status: DISCONTINUED | OUTPATIENT
Start: 2025-05-07 | End: 2025-05-16 | Stop reason: HOSPADM

## 2025-05-07 RX ORDER — ONDANSETRON 4 MG/1
4 TABLET, ORALLY DISINTEGRATING ORAL EVERY 8 HOURS PRN
Status: DISCONTINUED | OUTPATIENT
Start: 2025-05-07 | End: 2025-05-16 | Stop reason: HOSPADM

## 2025-05-07 RX ORDER — FUROSEMIDE 20 MG/1
20 TABLET ORAL DAILY
Status: DISCONTINUED | OUTPATIENT
Start: 2025-05-08 | End: 2025-05-16 | Stop reason: HOSPADM

## 2025-05-07 RX ORDER — GLUCAGON 1 MG/ML
1 KIT INJECTION PRN
Status: CANCELLED | OUTPATIENT
Start: 2025-05-07

## 2025-05-07 RX ORDER — ENOXAPARIN SODIUM 100 MG/ML
30 INJECTION SUBCUTANEOUS 2 TIMES DAILY
Status: CANCELLED | OUTPATIENT
Start: 2025-05-07

## 2025-05-07 RX ORDER — GUAIFENESIN/DEXTROMETHORPHAN 100-10MG/5
5 SYRUP ORAL EVERY 6 HOURS
Status: CANCELLED | OUTPATIENT
Start: 2025-05-07

## 2025-05-07 RX ORDER — INSULIN LISPRO 100 [IU]/ML
10 INJECTION, SOLUTION INTRAVENOUS; SUBCUTANEOUS
Status: DISCONTINUED | OUTPATIENT
Start: 2025-05-08 | End: 2025-05-16 | Stop reason: HOSPADM

## 2025-05-07 RX ORDER — FAMOTIDINE 20 MG/1
20 TABLET, FILM COATED ORAL 2 TIMES DAILY
Status: CANCELLED | OUTPATIENT
Start: 2025-05-07

## 2025-05-07 RX ORDER — POLYETHYLENE GLYCOL 3350 17 G/17G
17 POWDER, FOR SOLUTION ORAL DAILY PRN
Status: DISCONTINUED | OUTPATIENT
Start: 2025-05-07 | End: 2025-05-16 | Stop reason: HOSPADM

## 2025-05-07 RX ORDER — ENOXAPARIN SODIUM 100 MG/ML
30 INJECTION SUBCUTANEOUS 2 TIMES DAILY
Status: DISCONTINUED | OUTPATIENT
Start: 2025-05-07 | End: 2025-05-16 | Stop reason: HOSPADM

## 2025-05-07 RX ORDER — HYDROXYZINE HYDROCHLORIDE 25 MG/1
25 TABLET, FILM COATED ORAL EVERY 8 HOURS PRN
Qty: 30 TABLET | Refills: 0 | Status: ON HOLD | OUTPATIENT
Start: 2025-05-07 | End: 2025-05-17

## 2025-05-07 RX ORDER — FAMOTIDINE 20 MG/1
20 TABLET, FILM COATED ORAL 2 TIMES DAILY
Qty: 10 TABLET | Refills: 0 | Status: ON HOLD | OUTPATIENT
Start: 2025-05-07 | End: 2025-05-08 | Stop reason: CLARIF

## 2025-05-07 RX ORDER — INSULIN LISPRO 100 [IU]/ML
0-16 INJECTION, SOLUTION INTRAVENOUS; SUBCUTANEOUS
Status: DISCONTINUED | OUTPATIENT
Start: 2025-05-08 | End: 2025-05-16 | Stop reason: HOSPADM

## 2025-05-07 RX ORDER — DIPHENHYDRAMINE HCL 25 MG
50 TABLET ORAL EVERY 6 HOURS PRN
Status: DISCONTINUED | OUTPATIENT
Start: 2025-05-07 | End: 2025-05-11

## 2025-05-07 RX ORDER — INSULIN GLARGINE 100 [IU]/ML
18 INJECTION, SOLUTION SUBCUTANEOUS 2 TIMES DAILY
Status: CANCELLED | OUTPATIENT
Start: 2025-05-07

## 2025-05-07 RX ORDER — LEVOTHYROXINE SODIUM 112 UG/1
112 TABLET ORAL
Status: DISCONTINUED | OUTPATIENT
Start: 2025-05-08 | End: 2025-05-16 | Stop reason: HOSPADM

## 2025-05-07 RX ORDER — GLUCAGON 1 MG/ML
1 KIT INJECTION PRN
Status: DISCONTINUED | OUTPATIENT
Start: 2025-05-07 | End: 2025-05-16 | Stop reason: HOSPADM

## 2025-05-07 RX ORDER — ARFORMOTEROL TARTRATE 15 UG/2ML
15 SOLUTION RESPIRATORY (INHALATION)
Status: CANCELLED | OUTPATIENT
Start: 2025-05-07

## 2025-05-07 RX ORDER — DIPHENHYDRAMINE HCL 25 MG
50 TABLET ORAL EVERY 6 HOURS PRN
Qty: 60 TABLET | Refills: 0 | Status: ON HOLD | OUTPATIENT
Start: 2025-05-07 | End: 2025-05-08 | Stop reason: CLARIF

## 2025-05-07 RX ORDER — ACETAMINOPHEN 325 MG/1
650 TABLET ORAL EVERY 4 HOURS PRN
Status: CANCELLED | OUTPATIENT
Start: 2025-05-07

## 2025-05-07 RX ORDER — DEXTROSE MONOHYDRATE 100 MG/ML
INJECTION, SOLUTION INTRAVENOUS CONTINUOUS PRN
Status: DISCONTINUED | OUTPATIENT
Start: 2025-05-07 | End: 2025-05-16 | Stop reason: HOSPADM

## 2025-05-07 RX ORDER — DEXTROSE MONOHYDRATE 100 MG/ML
INJECTION, SOLUTION INTRAVENOUS CONTINUOUS PRN
Status: CANCELLED | OUTPATIENT
Start: 2025-05-07

## 2025-05-07 RX ADMIN — ENOXAPARIN SODIUM 40 MG: 100 INJECTION SUBCUTANEOUS at 08:23

## 2025-05-07 RX ADMIN — PREDNISONE 30 MG: 20 TABLET ORAL at 08:21

## 2025-05-07 RX ADMIN — INSULIN LISPRO 10 UNITS: 100 INJECTION, SOLUTION INTRAVENOUS; SUBCUTANEOUS at 17:25

## 2025-05-07 RX ADMIN — INSULIN LISPRO 16 UNITS: 100 INJECTION, SOLUTION INTRAVENOUS; SUBCUTANEOUS at 17:25

## 2025-05-07 RX ADMIN — CETIRIZINE HYDROCHLORIDE 5 MG: 10 TABLET, FILM COATED ORAL at 08:21

## 2025-05-07 RX ADMIN — LEVOTHYROXINE SODIUM 112 MCG: 0.11 TABLET ORAL at 06:17

## 2025-05-07 RX ADMIN — DIPHENHYDRAMINE HYDROCHLORIDE 50 MG: 25 TABLET ORAL at 22:02

## 2025-05-07 RX ADMIN — ENOXAPARIN SODIUM 30 MG: 100 INJECTION SUBCUTANEOUS at 21:51

## 2025-05-07 RX ADMIN — FAMOTIDINE 20 MG: 20 TABLET, FILM COATED ORAL at 08:20

## 2025-05-07 RX ADMIN — DIPHENHYDRAMINE HYDROCHLORIDE 50 MG: 25 TABLET ORAL at 08:21

## 2025-05-07 RX ADMIN — INSULIN LISPRO 4 UNITS: 100 INJECTION, SOLUTION INTRAVENOUS; SUBCUTANEOUS at 12:09

## 2025-05-07 RX ADMIN — INSULIN LISPRO 10 UNITS: 100 INJECTION, SOLUTION INTRAVENOUS; SUBCUTANEOUS at 08:22

## 2025-05-07 RX ADMIN — HYDROXYZINE PAMOATE 25 MG: 25 CAPSULE ORAL at 14:44

## 2025-05-07 RX ADMIN — IPRATROPIUM BROMIDE AND ALBUTEROL SULFATE 1 DOSE: .5; 3 SOLUTION RESPIRATORY (INHALATION) at 09:41

## 2025-05-07 RX ADMIN — INSULIN LISPRO 4 UNITS: 100 INJECTION, SOLUTION INTRAVENOUS; SUBCUTANEOUS at 08:22

## 2025-05-07 RX ADMIN — INSULIN GLARGINE 18 UNITS: 100 INJECTION, SOLUTION SUBCUTANEOUS at 21:50

## 2025-05-07 RX ADMIN — BUDESONIDE 500 MCG: 0.5 INHALANT RESPIRATORY (INHALATION) at 09:42

## 2025-05-07 RX ADMIN — ARFORMOTEROL TARTRATE 15 MCG: 15 SOLUTION RESPIRATORY (INHALATION) at 09:44

## 2025-05-07 RX ADMIN — HYDROXYZINE PAMOATE 25 MG: 25 CAPSULE ORAL at 08:22

## 2025-05-07 RX ADMIN — INSULIN GLARGINE 18 UNITS: 100 INJECTION, SOLUTION SUBCUTANEOUS at 08:20

## 2025-05-07 RX ADMIN — ARFORMOTEROL TARTRATE 15 MCG: 15 SOLUTION RESPIRATORY (INHALATION) at 20:37

## 2025-05-07 RX ADMIN — GUAIFENESIN AND DEXTROMETHORPHAN 5 ML: 100; 10 SYRUP ORAL at 06:17

## 2025-05-07 RX ADMIN — DIPHENHYDRAMINE HYDROCHLORIDE 50 MG: 25 TABLET ORAL at 14:44

## 2025-05-07 RX ADMIN — INSULIN LISPRO 10 UNITS: 100 INJECTION, SOLUTION INTRAVENOUS; SUBCUTANEOUS at 12:09

## 2025-05-07 RX ADMIN — GUAIFENESIN AND DEXTROMETHORPHAN 5 ML: 100; 10 SYRUP ORAL at 12:09

## 2025-05-07 RX ADMIN — FAMOTIDINE 20 MG: 20 TABLET, FILM COATED ORAL at 21:51

## 2025-05-07 RX ADMIN — FUROSEMIDE 20 MG: 20 TABLET ORAL at 08:22

## 2025-05-07 RX ADMIN — BUDESONIDE 500 MCG: 0.5 INHALANT RESPIRATORY (INHALATION) at 20:37

## 2025-05-07 ASSESSMENT — PAIN DESCRIPTION - DESCRIPTORS
DESCRIPTORS: ACHING
DESCRIPTORS_4: ACHING
DESCRIPTORS: ACHING

## 2025-05-07 ASSESSMENT — PAIN DESCRIPTION - LOCATION
LOCATION: BACK
LOCATION_3: NOSE
LOCATION_4: HEAD
LOCATION: BACK;HEAD
LOCATION_2: EAR

## 2025-05-07 ASSESSMENT — PAIN DESCRIPTION - ORIENTATION
ORIENTATION_3: RIGHT
ORIENTATION_4: ANTERIOR
ORIENTATION_2: RIGHT;LEFT
ORIENTATION: LOWER

## 2025-05-07 ASSESSMENT — PAIN SCALES - GENERAL
PAINLEVEL_OUTOF10: 2
PAINLEVEL_OUTOF10: 2

## 2025-05-07 ASSESSMENT — PAIN DESCRIPTION - FREQUENCY
FREQUENCY: CONTINUOUS
FREQUENCY: CONTINUOUS

## 2025-05-07 ASSESSMENT — PAIN DESCRIPTION - ONSET
ONSET: ON-GOING
ONSET: ON-GOING

## 2025-05-07 ASSESSMENT — PAIN - FUNCTIONAL ASSESSMENT: PAIN_FUNCTIONAL_ASSESSMENT: ACTIVITIES ARE NOT PREVENTED

## 2025-05-07 ASSESSMENT — PAIN DESCRIPTION - PAIN TYPE: TYPE: CHRONIC PAIN

## 2025-05-07 NOTE — PLAN OF CARE
Problem: Chronic Conditions and Co-morbidities  Goal: Patient's chronic conditions and co-morbidity symptoms are monitored and maintained or improved  4/24/2025 1442 by Randi Adams, RN  Outcome: Progressing  Flowsheets (Taken 4/24/2025 0800)  Care Plan - Patient's Chronic Conditions and Co-Morbidity Symptoms are Monitored and Maintained or Improved:   Monitor and assess patient's chronic conditions and comorbid symptoms for stability, deterioration, or improvement   Collaborate with multidisciplinary team to address chronic and comorbid conditions and prevent exacerbation or deterioration   Update acute care plan with appropriate goals if chronic or comorbid symptoms are exacerbated and prevent overall improvement and discharge  4/24/2025 0633 by Asher Beltre, RN  Outcome: Progressing     Problem: Discharge Planning  Goal: Discharge to home or other facility with appropriate resources  4/24/2025 1442 by Randi Adams, RN  Outcome: Progressing  Flowsheets (Taken 4/24/2025 0800)  Discharge to home or other facility with appropriate resources:   Identify barriers to discharge with patient and caregiver   Arrange for needed discharge resources and transportation as appropriate   Identify discharge learning needs (meds, wound care, etc)   Refer to discharge planning if patient needs post-hospital services based on physician order or complex needs related to functional status, cognitive ability or social support system  4/24/2025 0633 by Asher Beltre, RN  Outcome: Progressing     Problem: Safety - Adult  Goal: Free from fall injury  4/24/2025 1442 by Randi Adams, RN  Outcome: Progressing  4/24/2025 0633 by Asher Beltre, RN  Outcome: Progressing     
  Problem: Chronic Conditions and Co-morbidities  Goal: Patient's chronic conditions and co-morbidity symptoms are monitored and maintained or improved  4/24/2025 2336 by Mirna Streeter, RN  Outcome: Progressing  4/24/2025 1442 by Randi Adams RN  Outcome: Progressing  Flowsheets (Taken 4/24/2025 0800)  Care Plan - Patient's Chronic Conditions and Co-Morbidity Symptoms are Monitored and Maintained or Improved:   Monitor and assess patient's chronic conditions and comorbid symptoms for stability, deterioration, or improvement   Collaborate with multidisciplinary team to address chronic and comorbid conditions and prevent exacerbation or deterioration   Update acute care plan with appropriate goals if chronic or comorbid symptoms are exacerbated and prevent overall improvement and discharge     Problem: Discharge Planning  Goal: Discharge to home or other facility with appropriate resources  4/24/2025 2336 by Mirna Streeter, RN  Outcome: Progressing  4/24/2025 1442 by Randi Adams RN  Outcome: Progressing  Flowsheets (Taken 4/24/2025 0800)  Discharge to home or other facility with appropriate resources:   Identify barriers to discharge with patient and caregiver   Arrange for needed discharge resources and transportation as appropriate   Identify discharge learning needs (meds, wound care, etc)   Refer to discharge planning if patient needs post-hospital services based on physician order or complex needs related to functional status, cognitive ability or social support system     Problem: Safety - Adult  Goal: Free from fall injury  4/24/2025 2336 by Mirna Streeter, RN  Outcome: Progressing  4/24/2025 1442 by Randi Adams RN  Outcome: Progressing     Problem: Safety - Adult  Goal: Free from fall injury  4/24/2025 2336 by Mirna Streeter, RN  Outcome: Progressing  4/24/2025 1442 by Randi Adams, RN  Outcome: Progressing     
  Problem: Chronic Conditions and Co-morbidities  Goal: Patient's chronic conditions and co-morbidity symptoms are monitored and maintained or improved  5/4/2025 1532 by Stephanie Thomas RN  Outcome: Progressing  Flowsheets (Taken 5/4/2025 0815)  Care Plan - Patient's Chronic Conditions and Co-Morbidity Symptoms are Monitored and Maintained or Improved: Monitor and assess patient's chronic conditions and comorbid symptoms for stability, deterioration, or improvement  5/4/2025 0517 by Paula Noel RN  Outcome: Progressing  Flowsheets (Taken 5/3/2025 1945)  Care Plan - Patient's Chronic Conditions and Co-Morbidity Symptoms are Monitored and Maintained or Improved:   Monitor and assess patient's chronic conditions and comorbid symptoms for stability, deterioration, or improvement   Update acute care plan with appropriate goals if chronic or comorbid symptoms are exacerbated and prevent overall improvement and discharge   Collaborate with multidisciplinary team to address chronic and comorbid conditions and prevent exacerbation or deterioration     Problem: Discharge Planning  Goal: Discharge to home or other facility with appropriate resources  5/4/2025 1532 by Stephanie Thomas RN  Outcome: Progressing  Flowsheets (Taken 5/4/2025 0815)  Discharge to home or other facility with appropriate resources: Identify barriers to discharge with patient and caregiver     Problem: Safety - Adult  Goal: Free from fall injury  5/4/2025 1532 by Stephanie Thomas RN  Outcome: Progressing     Problem: Pain  Goal: Verbalizes/displays adequate comfort level or baseline comfort level  5/4/2025 1532 by Stephanie Thomas RN  Outcome: Progressing     Problem: Skin/Tissue Integrity  Goal: Skin integrity remains intact  Description: 1.  Monitor for areas of redness and/or skin breakdown2.  Assess vascular access sites hourly3.  Every 4-6 hours minimum:  Change oxygen saturation probe site4.  Every 4-6 hours:  If on nasal continuous positive airway 
  Problem: Chronic Conditions and Co-morbidities  Goal: Patient's chronic conditions and co-morbidity symptoms are monitored and maintained or improved  5/5/2025 0758 by Cecilio Benitez RN  Outcome: Progressing  5/5/2025 0745 by Cecilio Benitez RN  Outcome: Progressing     Problem: Discharge Planning  Goal: Discharge to home or other facility with appropriate resources  5/5/2025 0758 by Cecilio Benitez RN  Outcome: Progressing  5/5/2025 0745 by Cecilio Benitez RN  Outcome: Progressing     Problem: Safety - Adult  Goal: Free from fall injury  5/5/2025 0758 by Cecilio Benitez RN  Outcome: Progressing  5/5/2025 0745 by Cecilio Benitez RN  Outcome: Progressing     Problem: Pain  Goal: Verbalizes/displays adequate comfort level or baseline comfort level  5/5/2025 0758 by Cecilio Benitez RN  Outcome: Progressing  5/5/2025 0745 by Cecilio Benitez RN  Outcome: Progressing     Problem: Skin/Tissue Integrity  Goal: Skin integrity remains intact  Description: 1.  Monitor for areas of redness and/or skin breakdown2.  Assess vascular access sites hourly3.  Every 4-6 hours minimum:  Change oxygen saturation probe site4.  Every 4-6 hours:  If on nasal continuous positive airway pressure, respiratory therapy assess nares and determine need for appliance change or resting period  5/5/2025 0758 by Cecilio Benitez RN  Outcome: Progressing  5/5/2025 0745 by Cecilio Benitez RN  Outcome: Progressing     
  Problem: Chronic Conditions and Co-morbidities  Goal: Patient's chronic conditions and co-morbidity symptoms are monitored and maintained or improved  Outcome: Progressing     Problem: Discharge Planning  Goal: Discharge to home or other facility with appropriate resources  Outcome: Progressing     Problem: Safety - Adult  Goal: Free from fall injury  Outcome: Progressing     
  Problem: Chronic Conditions and Co-morbidities  Goal: Patient's chronic conditions and co-morbidity symptoms are monitored and maintained or improved  Outcome: Progressing     Problem: Discharge Planning  Goal: Discharge to home or other facility with appropriate resources  Outcome: Progressing     Problem: Safety - Adult  Goal: Free from fall injury  Outcome: Progressing     Problem: Pain  Goal: Verbalizes/displays adequate comfort level or baseline comfort level  Outcome: Progressing     Problem: Skin/Tissue Integrity  Goal: Skin integrity remains intact  Description: 1.  Monitor for areas of redness and/or skin breakdown2.  Assess vascular access sites hourly3.  Every 4-6 hours minimum:  Change oxygen saturation probe site4.  Every 4-6 hours:  If on nasal continuous positive airway pressure, respiratory therapy assess nares and determine need for appliance change or resting period  Outcome: Progressing     
  Problem: Chronic Conditions and Co-morbidities  Goal: Patient's chronic conditions and co-morbidity symptoms are monitored and maintained or improved  Outcome: Progressing     Problem: Discharge Planning  Goal: Discharge to home or other facility with appropriate resources  Outcome: Progressing     Problem: Safety - Adult  Goal: Free from fall injury  Outcome: Progressing     Problem: Pain  Goal: Verbalizes/displays adequate comfort level or baseline comfort level  Outcome: Progressing  Flowsheets (Taken 5/2/2025 0800)  Verbalizes/displays adequate comfort level or baseline comfort level: Encourage patient to monitor pain and request assistance     
  Problem: Chronic Conditions and Co-morbidities  Goal: Patient's chronic conditions and co-morbidity symptoms are monitored and maintained or improved  Outcome: Progressing  Flowsheets (Taken 5/3/2025 1945)  Care Plan - Patient's Chronic Conditions and Co-Morbidity Symptoms are Monitored and Maintained or Improved:   Monitor and assess patient's chronic conditions and comorbid symptoms for stability, deterioration, or improvement   Update acute care plan with appropriate goals if chronic or comorbid symptoms are exacerbated and prevent overall improvement and discharge   Collaborate with multidisciplinary team to address chronic and comorbid conditions and prevent exacerbation or deterioration     Problem: Discharge Planning  Goal: Discharge to home or other facility with appropriate resources  Outcome: Progressing  Flowsheets (Taken 5/3/2025 1945)  Discharge to home or other facility with appropriate resources:   Identify barriers to discharge with patient and caregiver   Arrange for needed discharge resources and transportation as appropriate   Identify discharge learning needs (meds, wound care, etc)   Refer to discharge planning if patient needs post-hospital services based on physician order or complex needs related to functional status, cognitive ability or social support system     Problem: Safety - Adult  Goal: Free from fall injury  Outcome: Progressing     Problem: Pain  Goal: Verbalizes/displays adequate comfort level or baseline comfort level  Outcome: Progressing     Problem: Skin/Tissue Integrity  Goal: Skin integrity remains intact  Description: 1.  Monitor for areas of redness and/or skin breakdown2.  Assess vascular access sites hourly3.  Every 4-6 hours minimum:  Change oxygen saturation probe site4.  Every 4-6 hours:  If on nasal continuous positive airway pressure, respiratory therapy assess nares and determine need for appliance change or resting period  Outcome: Progressing     
Consult received. Full PM&R consult to follow tomorrow. If CM inquiring only if patient is appropriate for IPR, she is not appropriate for IPR due to requiring HFNC supplemental oxygen. LTACH most appropriate at this time.     Abdon Sen MD 04/30/25 5:25 PM    
sites hourly3.  Every 4-6 hours minimum:  Change oxygen saturation probe site4.  Every 4-6 hours:  If on nasal continuous positive airway pressure, respiratory therapy assess nares and determine need for appliance change or resting period  5/7/2025 0942 by Lani Quintanilla RN  Outcome: Progressing  Flowsheets (Taken 5/7/2025 0942)  Skin Integrity Remains Intact: Monitor for areas of redness and/or skin breakdown

## 2025-05-07 NOTE — PROGRESS NOTES
[facility] Admission Assessment    Ethnicity  \"Are you of , /a, or Macanese origin?\"  Check all that apply:  [x] A.  No, not of , /a, or Macanese Origin  [] B.  Yes, Venezuelan, Venezuelan American, Chicano/a  [] C.  Yes, Peruvian  [] D.  Yes, Popeye  [] E.  Yes, another , , or Macanese origin  [] X.  Patient unable to respond  [] Y.  Patient declines to respond    Race  \"What is your race?\"  Check all that apply:  [] A.  White  [] B.  Black or   [] C.   or   [] D.     [] E.  Chinese  [] F.  Central African  [] G. Stateless  [] H.  Amharic  [] I.  Croatian  [] J.  Other   [] K.    [] L.  Burmese or Deyanira  [] M.  [place] citizen  [] N.  Other   [] X.  Patient unable to respond  [x] Y.  Patient declines to respond  [] Z.  None of the above    Language  A.  \"What is your preferred language?\"   English    B.  \"Do you need or want an  to communicate with a doctor or health care staff?\"  Check only one:  [x] 0.  No  [] 1.  Yes  [] 9.  Unable to determine    Transportation  \"Has lack of transportation kept you from medical appointments, meetings, work, or from getting things needed for daily living?\"Check all that apply:  [] A.  Yes, it has kept me from medical appointments or from getting my medications  [x] B.  Yes, it has kept me from non-medical meetings, appointments, work, or from getting things that I need  [] C.  No  [] X.  Patient unable to respond  [] Y.  Patient declines to respond    Hearing  Ability to hear (with hearing aid or hearing appliances if normally used)  [x]  0.  Adequate - no difficulty in normal conversation, social interaction, listening to TV  []  1.  Minimal difficulty - difficulty in some environments (e.g. when person speaks softly or setting is noisy)  []  2.  Moderate difficulty - speaker has to increase volume and speak distinctly   []  3.  Highly impaired - absence of  score 0-27, or enter 99 if unable to complete (if symptom frequency (column 2) is blank for 3 or more items).   2     Social Isolation  \"How often do you feel lonely or isolated from those around you?\"  [x] 0.  Never  [] 1.  Rarely  [] 2.  Sometimes  [] 3.  Often  [] 4.  Always  [] 7.  Patient declines to respond  [] 8.  Patient unable to respond    Pain Effect on Sleep  \"Over the past 5 days, how much of the time has pain made it hard for you to sleep at night?\"  []  0.  Does not apply - I have not had any pain or hurting in the past 5 days  []  1.  Rarely or not at all  []  2.  Occasionally  []  3.  Frequently  [x]  4.  Almost constantly  []  8.  Unable to answer    **If the patient answers \"0.  Does not apply\" to this question, skip the next two \"Pain Effect...\" questions**    Pain Interference with Therapy Activities  \"Over the past 5 days, how often have you limited your participation in rehabilitation therapy sessions due to pain?\"  []  0.  Does not apply - I have not received rehabilitation therapy in the past 5 days  [x]  1.  Rarely or not at all  []  2.  Occasionally  []  3.  Frequently  []  4.  Almost constantly  []  8.  Unable to answer    Pain Interference with Day-to-Day Activities:  \"Over the past 5 days, how often have you limited your day-to-day activities (excluding rehabilitation therapy session)?\"  [x]  1.  Rarely or not at all  []  2.  Occasionally  []  3.  Frequently  []  4.  Almost constantly  []  8.  Unable to answer    Nutritional Approaches  Check all of the following nutritional approaches that apply on admission:  []  A.  Parenteral/IV feeding (including IV fluids if needed for hydration, but not as part of dialysis/chemo)  []  B.  Feeding tube (e.g., nasogastric or abdominal (PEG))  []  C.  Mechanically altered diet - requires change in texture of food or liquids (e.g., pureed food, thickened liquids)  [x]  D.  Therapeutic diet (e.g., low salt, diabetic, low cholesterol)  []  Z.  None of

## 2025-05-07 NOTE — DISCHARGE SUMMARY
Hospital Medicine Discharge Summary    Patient ID: Jaden Denise      Patient's PCP: No primary care provider on file.    Admit Date: 4/23/2025     Discharge Date:   5/7/2025     Admitting Provider: Humberto Sanford MD     Discharge Provider: Kimo Torres MD     Discharge Diagnoses:       Active Hospital Problems    Diagnosis     Urticarial rash [L50.9]     COPD exacerbation (HCC) [J44.1]     Pneumonia due to infectious agent [J18.9]     GERD (gastroesophageal reflux disease) [K21.9]     Mixed hyperlipidemia [E78.2]        The patient was seen and examined on day of discharge and this discharge summary is in conjunction with any daily progress note from day of discharge.    Hospital Course:   The patient is a 60-year-old lady with history of GERD, hyperlipidemia, anxiety/depression, chronic lower back pain, COPD, type 2 diabetes, hypothyroidism, obesity who presented with shortness of breath and dry cough found to have multifocal pneumonia complicated by acute exacerbation of COPD, acute respiratory failure with hypoxia due to COPD and pneumonia and decreased functional endurance due to acute illness.       Pneumonia due to infectious agent: Left improved.  Completed a course of antibiotics.         Physical debility: Awaiting placement for rehab     COPD exacerbation: Improved respiratory status.  At baseline O2 use.  Continue breathing therapy and O2 supplementation.  Wean off as tolerated.       Urticarial rash: Patient reports coincidence with antifungal medications given also noted to have received Pyridium which was continued to this morning.  She is \"confluent maculopapular rash on the torso as well as flexural areas.  Likely intolerance to one of the medication suspicious for Pyridium.  Slowly improving  Continue prednisone with famotidine, Benadryl, Atarax       Active Problems:    GERD (gastroesophageal reflux disease): On Protonix    Mixed hyperlipidemia: Stable      Physical Exam

## 2025-05-07 NOTE — PROGRESS NOTES
Patient was admitted to room 4910 at 1815.  Patient was oriented to the Call Light, Phone, TV, Thermostat, Bed Controls, Bathroom and Emergency Cord.  Patient verbalized and demonstrated understanding of all.  Patient was also given an overview of Unit Routines for Acute Rehab, including the patient's rights and responsibilities as well as the CMS IRF ROHAN Privacy Act Statement providing notice of data collection.  Patient states that their normal bowel regime is daily. Meal times were explained, including how to order food.  The white board, (which is posted on the wall by the door is used for communication) has the Therapy Scheduled that is posted each day along with the name of your doctor, nurse, and therapist for your convenience.  We recommend any family that will be care givers or any care givers the patient has, take part in therapy.  We have no set visiting hours, we suggest non-caregiver friends and family visitors come after therapy (at 4 PM or later) to allow patient to rest in between sessions.      In conjunction with the patient and patient’s family, this nurse worked to establish a tailored Fall TIPS plan to ensure patient safety and compliance:    Falls TIPS Completion    Patient identified as increased risk for harm if fall:  [x] Yes     Fall Risks  History of Falls:    [x] Yes   Medication Side Effects:   [] Yes   Walking Aid:    [x] Yes   IV Pole or Equipment:   [] Yes   Unsteady Walk:     [x] Yes   May Forget or Choose Not to Call: [] Yes     Fall Interventions   Communicate Recent Fall and/or Risk of Harm: [x] Yes   Walking Aids:  Crutches: [] Yes   Cane: [] Yes   Walker: [x] Yes   IV Assistance When Walking: [] Yes   Toileting Schedule: Every 1-2 Hours  Bedpan:   [] Yes   Assist to Commode: [x] Yes   Assist to Bathroom: [x] Yes   Bed Alarm On: [x] Yes   Assistance Out of Bed:  Bedrest: [] Yes   1 Person: [x] Yes   2 People: [] Yes

## 2025-05-08 LAB
GLUCOSE BLD-MCNC: 172 MG/DL (ref 70–99)
GLUCOSE BLD-MCNC: 244 MG/DL (ref 70–99)
GLUCOSE BLD-MCNC: 303 MG/DL (ref 70–99)
GLUCOSE BLD-MCNC: 310 MG/DL (ref 70–99)
PERFORMED ON: ABNORMAL

## 2025-05-08 PROCEDURE — 6360000002 HC RX W HCPCS: Performed by: STUDENT IN AN ORGANIZED HEALTH CARE EDUCATION/TRAINING PROGRAM

## 2025-05-08 PROCEDURE — 97110 THERAPEUTIC EXERCISES: CPT

## 2025-05-08 PROCEDURE — 97161 PT EVAL LOW COMPLEX 20 MIN: CPT

## 2025-05-08 PROCEDURE — 94640 AIRWAY INHALATION TREATMENT: CPT

## 2025-05-08 PROCEDURE — 1280000000 HC REHAB R&B

## 2025-05-08 PROCEDURE — 97165 OT EVAL LOW COMPLEX 30 MIN: CPT

## 2025-05-08 PROCEDURE — 2700000000 HC OXYGEN THERAPY PER DAY

## 2025-05-08 PROCEDURE — 2500000003 HC RX 250 WO HCPCS: Performed by: STUDENT IN AN ORGANIZED HEALTH CARE EDUCATION/TRAINING PROGRAM

## 2025-05-08 PROCEDURE — 97535 SELF CARE MNGMENT TRAINING: CPT

## 2025-05-08 PROCEDURE — 97530 THERAPEUTIC ACTIVITIES: CPT

## 2025-05-08 PROCEDURE — 94660 CPAP INITIATION&MGMT: CPT

## 2025-05-08 PROCEDURE — 6370000000 HC RX 637 (ALT 250 FOR IP): Performed by: STUDENT IN AN ORGANIZED HEALTH CARE EDUCATION/TRAINING PROGRAM

## 2025-05-08 RX ORDER — DULOXETIN HYDROCHLORIDE 30 MG/1
30 CAPSULE, DELAYED RELEASE ORAL DAILY
Status: ON HOLD | COMMUNITY
End: 2025-05-08 | Stop reason: CLARIF

## 2025-05-08 RX ORDER — SIMETHICONE 80 MG
80 TABLET,CHEWABLE ORAL EVERY 6 HOURS PRN
Status: DISCONTINUED | OUTPATIENT
Start: 2025-05-08 | End: 2025-05-16 | Stop reason: HOSPADM

## 2025-05-08 RX ORDER — SODIUM CHLORIDE 0.9 % (FLUSH) 0.9 %
5-40 SYRINGE (ML) INJECTION PRN
Status: DISCONTINUED | OUTPATIENT
Start: 2025-05-08 | End: 2025-05-16 | Stop reason: HOSPADM

## 2025-05-08 RX ORDER — DULOXETIN HYDROCHLORIDE 30 MG/1
30 CAPSULE, DELAYED RELEASE ORAL DAILY
Status: DISCONTINUED | OUTPATIENT
Start: 2025-05-08 | End: 2025-05-08

## 2025-05-08 RX ORDER — SODIUM CHLORIDE 0.9 % (FLUSH) 0.9 %
5-40 SYRINGE (ML) INJECTION 2 TIMES DAILY
Status: DISCONTINUED | OUTPATIENT
Start: 2025-05-08 | End: 2025-05-10

## 2025-05-08 RX ADMIN — BUDESONIDE 500 MCG: 0.5 INHALANT RESPIRATORY (INHALATION) at 20:50

## 2025-05-08 RX ADMIN — DIPHENHYDRAMINE HYDROCHLORIDE 50 MG: 25 TABLET ORAL at 20:36

## 2025-05-08 RX ADMIN — FAMOTIDINE 20 MG: 20 TABLET, FILM COATED ORAL at 20:33

## 2025-05-08 RX ADMIN — ENOXAPARIN SODIUM 30 MG: 100 INJECTION SUBCUTANEOUS at 20:32

## 2025-05-08 RX ADMIN — INSULIN LISPRO 4 UNITS: 100 INJECTION, SOLUTION INTRAVENOUS; SUBCUTANEOUS at 20:32

## 2025-05-08 RX ADMIN — INSULIN LISPRO 12 UNITS: 100 INJECTION, SOLUTION INTRAVENOUS; SUBCUTANEOUS at 08:15

## 2025-05-08 RX ADMIN — BUDESONIDE 500 MCG: 0.5 INHALANT RESPIRATORY (INHALATION) at 05:17

## 2025-05-08 RX ADMIN — GUAIFENESIN AND DEXTROMETHORPHAN 5 ML: 100; 10 SYRUP ORAL at 13:10

## 2025-05-08 RX ADMIN — INSULIN LISPRO 10 UNITS: 100 INJECTION, SOLUTION INTRAVENOUS; SUBCUTANEOUS at 08:15

## 2025-05-08 RX ADMIN — INSULIN GLARGINE 18 UNITS: 100 INJECTION, SOLUTION SUBCUTANEOUS at 08:16

## 2025-05-08 RX ADMIN — PREDNISONE 30 MG: 20 TABLET ORAL at 08:14

## 2025-05-08 RX ADMIN — GUAIFENESIN AND DEXTROMETHORPHAN 5 ML: 100; 10 SYRUP ORAL at 23:53

## 2025-05-08 RX ADMIN — DIPHENHYDRAMINE HYDROCHLORIDE 50 MG: 25 TABLET ORAL at 06:45

## 2025-05-08 RX ADMIN — GUAIFENESIN AND DEXTROMETHORPHAN 5 ML: 100; 10 SYRUP ORAL at 17:45

## 2025-05-08 RX ADMIN — LEVOTHYROXINE SODIUM 112 MCG: 0.11 TABLET ORAL at 06:45

## 2025-05-08 RX ADMIN — DIPHENHYDRAMINE HYDROCHLORIDE 50 MG: 25 TABLET ORAL at 15:05

## 2025-05-08 RX ADMIN — SIMETHICONE 80 MG: 80 TABLET, CHEWABLE ORAL at 14:56

## 2025-05-08 RX ADMIN — GUAIFENESIN AND DEXTROMETHORPHAN 5 ML: 100; 10 SYRUP ORAL at 06:46

## 2025-05-08 RX ADMIN — ARFORMOTEROL TARTRATE 15 MCG: 15 SOLUTION RESPIRATORY (INHALATION) at 20:50

## 2025-05-08 RX ADMIN — SODIUM CHLORIDE, PRESERVATIVE FREE 10 ML: 5 INJECTION INTRAVENOUS at 08:21

## 2025-05-08 RX ADMIN — ACETAMINOPHEN 650 MG: 325 TABLET ORAL at 08:14

## 2025-05-08 RX ADMIN — INSULIN LISPRO 10 UNITS: 100 INJECTION, SOLUTION INTRAVENOUS; SUBCUTANEOUS at 17:45

## 2025-05-08 RX ADMIN — ENOXAPARIN SODIUM 30 MG: 100 INJECTION SUBCUTANEOUS at 08:14

## 2025-05-08 RX ADMIN — FUROSEMIDE 20 MG: 20 TABLET ORAL at 08:14

## 2025-05-08 RX ADMIN — INSULIN GLARGINE 18 UNITS: 100 INJECTION, SOLUTION SUBCUTANEOUS at 20:32

## 2025-05-08 RX ADMIN — INSULIN LISPRO 10 UNITS: 100 INJECTION, SOLUTION INTRAVENOUS; SUBCUTANEOUS at 13:10

## 2025-05-08 RX ADMIN — ACETAMINOPHEN 650 MG: 325 TABLET ORAL at 00:06

## 2025-05-08 RX ADMIN — SODIUM CHLORIDE, PRESERVATIVE FREE 5 ML: 5 INJECTION INTRAVENOUS at 00:30

## 2025-05-08 RX ADMIN — ARFORMOTEROL TARTRATE 15 MCG: 15 SOLUTION RESPIRATORY (INHALATION) at 05:17

## 2025-05-08 RX ADMIN — FAMOTIDINE 20 MG: 20 TABLET, FILM COATED ORAL at 08:14

## 2025-05-08 RX ADMIN — INSULIN LISPRO 12 UNITS: 100 INJECTION, SOLUTION INTRAVENOUS; SUBCUTANEOUS at 17:45

## 2025-05-08 RX ADMIN — GUAIFENESIN AND DEXTROMETHORPHAN 5 ML: 100; 10 SYRUP ORAL at 00:06

## 2025-05-08 ASSESSMENT — PAIN SCALES - GENERAL
PAINLEVEL_OUTOF10: 3
PAINLEVEL_OUTOF10: 3
PAINLEVEL_OUTOF10: 2
PAINLEVEL_OUTOF10: 0

## 2025-05-08 ASSESSMENT — PAIN DESCRIPTION - PAIN TYPE: TYPE: CHRONIC PAIN

## 2025-05-08 ASSESSMENT — PAIN DESCRIPTION - ONSET: ONSET: ON-GOING

## 2025-05-08 ASSESSMENT — PAIN SCALES - WONG BAKER: WONGBAKER_NUMERICALRESPONSE: NO HURT

## 2025-05-08 ASSESSMENT — PAIN DESCRIPTION - LOCATION
LOCATION: BUTTOCKS;BACK
LOCATION: BACK

## 2025-05-08 ASSESSMENT — PAIN DESCRIPTION - ORIENTATION: ORIENTATION: LOWER

## 2025-05-08 ASSESSMENT — PAIN DESCRIPTION - FREQUENCY: FREQUENCY: CONTINUOUS

## 2025-05-08 NOTE — PROGRESS NOTES
[facility] - Inpatient Rehabilitation Department   Phone: [phone]    Physical Therapy    [x] Initial Evaluation            [] Daily Treatment Note         [] Discharge Summary      Patient: Jaden Denise   : 1965   MRN: [id number]   Date of Service:  2025  Admitting Diagnosis: Acute respiratory failure with hypoxia (HCC)  Current Admission Summary: 60 y.o. female with PMHx notable for T2DM, COPD, hypothyroidism, tobacco use disorder, morbid obesity who presented on 2025 with dyspnea and cough.  She was hypoxic on room air, CT chest showed patchy bilateral infiltrates, left greater than right.  She required NIV with BiPAP.  She was started on broad-spectrum IV antibiotics, IV steroids and breathing treatments for COPD exacerbation and bilateral multifocal pneumonia.  Pulmonology consulted, following.  She completed course of IV ceftriaxone and azithromycin.  She initially required high flow nasal cannula, but has been able to wean down to low-flow.  She is requiring BiPAP overnight, with concern for underlying ARISTEO.  She was also diuresed with IV Lasix, now transitioned to oral.   Past Medical History:  has a past medical history of COPD (chronic obstructive pulmonary disease) (HCC), Diabetes mellitus (HCC), and Thyroid disease.  Past Surgical History:  has a past surgical history that includes Spinal fusion; Ovary removal (Left); and Endometrial ablation.  Discharge Recommendations: Home w/ HHPT   DME Required For Discharge: DME to be determined pending patient progress  Precautions/Restrictions: high fall risk  Weight Bearing Restrictions: no restrictions  [] Right Upper Extremity  [] Left Upper Extremity [] Right Lower Extremity  [] Left Lower Extremity     Required Braces/Orthotics: no braces required   [] Right  [] Left  Positional Restrictions:no positional restrictions    Pre-Admission Information   Lives With: son (son has dialysis 3 days a week, works full time either at

## 2025-05-08 NOTE — H&P
Patient: Jaden Denise  [id number]  Date: 5/8/2025    Chief Complaint: Debility    History of Present Illness/Hospital Course:  Jaden Denise is a 60 y.o. female with PMHx notable for T2DM, COPD, hypothyroidism, tobacco use disorder, morbid obesity who presented on 4/23/2025 with dyspnea and cough.  She was hypoxic on room air, CT chest showed patchy bilateral infiltrates, left greater than right.  She required NIV with BiPAP.  She was started on broad-spectrum IV antibiotics, IV steroids and breathing treatments for COPD exacerbation and bilateral multifocal pneumonia.  Pulmonology consulted, following.  She completed course of IV ceftriaxone and azithromycin.  She initially required high flow nasal cannula, but has been able to wean down to low-flow.  She is requiring BiPAP overnight, with concern for underlying ARISTEO.  She was also diuresed with IV Lasix, now transitioned to oral.    Currently patient reports that she is doing well.  She is breathing comfortably.  She slept well last night, but continues to struggle to tolerate BiPAP.  She says she wore it for only around 30 minutes.  She continues to have a rash involving her chest, flanks, back and left lower extremity.  It seems to be improving the last several days, less itchy.  Is also feeling better after shower earlier today.  She continues to have urinary incontinence, discussed resuming her oxybutynin once she is no longer requiring oral antihistamines. Last BM (including prior to admit): 05/07/25.     Prior Level of Function:  Modified independent for mobility with rollator, independent with ADLs, has aides services 3 hours/day.  Lives with son in 1 level house with ramped entry.  Patient's son has his own chronic medical issues, unable to provide physical assistance to patient.    Current Level of Function:      Functional Deficits:  Generalized weakness, decreased functional endurance limiting independence with functional mobility and ADLs.

## 2025-05-08 NOTE — PLAN OF CARE
Problem: Chronic Conditions and Co-morbidities  Goal: Patient's chronic conditions and co-morbidity symptoms are monitored and maintained or improved  Outcome: Progressing  Flowsheets (Taken 5/7/2025 2001)  Care Plan - Patient's Chronic Conditions and Co-Morbidity Symptoms are Monitored and Maintained or Improved:   Monitor and assess patient's chronic conditions and comorbid symptoms for stability, deterioration, or improvement   Collaborate with multidisciplinary team to address chronic and comorbid conditions and prevent exacerbation or deterioration   Update acute care plan with appropriate goals if chronic or comorbid symptoms are exacerbated and prevent overall improvement and discharge     Problem: Discharge Planning  Goal: Discharge to home or other facility with appropriate resources  Outcome: Progressing  Flowsheets (Taken 5/7/2025 2001)  Discharge to home or other facility with appropriate resources: Identify discharge learning needs (meds, wound care, etc)     Problem: Pain  Goal: Verbalizes/displays adequate comfort level or baseline comfort level  Outcome: Progressing  Flowsheets (Taken 5/7/2025 8497)  Verbalizes/displays adequate comfort level or baseline comfort level: Encourage patient to monitor pain and request assistance     Problem: Skin/Tissue Integrity  Goal: Skin integrity remains intact  Description: 1.  Monitor for areas of redness and/or skin breakdown2.  Assess vascular access sites hourly3.  Every 4-6 hours minimum:  Change oxygen saturation probe site4.  Every 4-6 hours:  If on nasal continuous positive airway pressure, respiratory therapy assess nares and determine need for appliance change or resting period  Outcome: Progressing  Flowsheets (Taken 5/7/2025 2001)  Skin Integrity Remains Intact: Monitor for areas of redness and/or skin breakdown     Problem: Safety - Adult  Goal: Free from fall injury  Outcome: Progressing     Problem: ABCDS Injury Assessment  Goal: Absence of

## 2025-05-08 NOTE — PLAN OF CARE
[facility] PATIENT TREATMENT PLAN  [address] [address]  [phone]      Jaden Denise    : 1965  [id number]  MRN: [id number]  PHYSICIAN:  Abdon Sen MD  Primary Problem    Patient Active Problem List   Diagnosis    Bilateral edema of lower extremity    Chronic low back pain    Chronic shoulder pain    GERD (gastroesophageal reflux disease)    Mixed anxiety and depressive disorder    Mixed hyperlipidemia    S/P lumbar fusion    Pneumonia due to infectious agent    Urticarial rash    COPD exacerbation (HCC)    Acute respiratory failure with hypoxia (HCC)       Rehabilitation Diagnosis:       #. Acute hypoxic respiratory failure, improved  #. COPD exacerbation  #. Community-acquired neumonia  - Supplemental O2 as necessary to maintain SpO2 >88%, wean as able. Continue NIV overnight.  Needs outpatient sleep study, but will see about getting her set up with home BiPAP prior to discharge.  - Continue scheduled and as needed nebulizer breathing treatments  - Robitussin for cough, congestion  - Completed course of antibiotics while in acute hospital.    #. Debility  - Continue PT/OT     #. HFpEF  - Continue Lasix  - Monitor electrolytes, renal function  - Daily weights       #.  T2DM, poorly controlled  - Last A1c 8.8%  - Lantus 18 units BID, Humalog 10 units TID w/ meals, high dose correction scale     #.  Urticaria, suspected to be reaction to medication  - Suspected related to either topical miconazole or Pyridium  - Was receiving 4 antihistamine medications concurrently in the acute hospital.  Stopped hydroxyzine and cetirizine due to polypharmacy concerns.  Continue Benadryl 50 mg q6h PRN for itching, famotidine 20 mg BID, and oral steroid taper.  No evidence for benefit with topical antihistamines or corticosteroids in the treatment of urticaria.     Chronic Conditions:  - Obesity: Provide education/counseling on association w/ adverse health  speech/language/communication therapy   []  group therapy    [x]  patient/family education    [] Other:    CASE MANAGEMENT:  Goals:  Assist patient/family with discharge planning, patient/family counseling, and coordination with insurance during [facility] stay.       Jaden Denise will be seen a minimum of 3 hours of therapy per day, a minimum of 5 out of 7 days per week  (please see above for specific treatment plan per PT/OT/SLP).    [] In this rare instance due to the nature of this patient's medical involvement, this patient will be seen 15 hours per week (900 minutes within a 7 day period).    In addition, dietician/nutritionist may monitor calorie count as well as intake and collaboratively work with SLP on dietary upgrades.  Neuropsychology/Psychology may evaluate and provide necessary support.    Medical issues being managed closely and that require 24 hour availability of a physician:  [] Swallowing Precautions  [x] Bowel/Bladder Fx  [] Weight bearing precautions  [x] Wound Care    [x] Pain Mgmt   [x] Infection Protection  [x] DVT Prophylaxis   [x] Fall Precautions  [x] Fluid/Electrolyte/Nutrition Balance  [] Voice Protection   [x] Respiratory  [] Other:    Medical Prognosis: [] Good  [x] Fair    [] Guarded   Total expected IRF days: 7  Anticipated discharge destination: Home  [] Home Independently   [x] Home with supervision - for stairs only    []SNF     [] Other                                           Physician anticipated functional outcomes:  By discharge, the patient will progress to being independent with all mobility and activities except requiring supervision for stairs.   IPOC brief synthesis: Jaden Denise is a 60 y.o. female with PMHx notable for T2DM, COPD, hypothyroidism, tobacco use disorder, morbid obesity who presented on 4/23/2025 with dyspnea and cough.  She was hypoxic on room air, CT chest showed patchy bilateral infiltrates, left greater than right.  She required NIV with BiPAP.  She

## 2025-05-08 NOTE — PROGRESS NOTES
Hospitalist Progress Note      PCP: No primary care provider on file.    Date of Admission: 4/23/2025    LOS: 12    Chief Complaint:   Chief Complaint   Patient presents with    Shortness of Breath     Pt. Started with not being able to get warm, then cough and then SOB. [facility] squad states O2 sats 71% on room air. Pt. Was given a duoneb and placed on 6 l of O2 and sats up to 91%       Case Summary:   60-year-old lady with history of GERD, hyperlipidemia, anxiety/depression, chronic lower back pain, COPD, type 2 diabetes, hypothyroidism, obesity who presented with shortness of breath and dry cough found to have multifocal pneumonia complicated by acute exacerbation of COPD, acute respiratory failure with hypoxia due to COPD and pneumonia and decreased functional endurance due to acute illness.      Active Hospital Problems    Diagnosis Date Noted    Urticarial rash [L50.9] 05/06/2025    Pneumonia due to infectious agent [J18.9] 04/24/2025    GERD (gastroesophageal reflux disease) [K21.9] 06/01/2014    Mixed hyperlipidemia [E78.2] 12/17/2010         Principal Problem:    Pneumonia due to infectious agent: Left improved.  Completed a course of antibiotics.  Awaiting placement for rehab      Physical debility: Awaiting placement for rehab    COPD exacerbation: Improved respiratory status.  At baseline O2 use.  Continue breathing therapy and O2 supplementation.  Wean off as tolerated.      Urticarial rash: Patient reports coincidence with antifungal medications given also noted to have received Pyridium which was continued to this morning.  She is \"confluent maculopapular rash on the torso as well as flexural areas.  Likely intolerance to one of the medication.  - Will discontinue Pyridium.  She only received one-time dose of fluconazole.  - Likely intolerance to Pyridium.  - Will give prednisone with famotidine  - Continue topical applications    Active Problems:    GERD (gastroesophageal reflux 
     PULMONARY AND CRITICAL CARE MEDICINE PROGRESS NOTE    Subjective: Patient sitting in bed in no apparent respiratory distress.  Reports that her breathing is about the same.  Remains on AirVo at 40 L/min of flow and 80% FiO2.  Minimal expectoration present.  Used BiPAP only for 1 hour.  Reports that she felt anxious and took the BiPAP off.  Still feels chest congestion.  Remains afebrile.     REVIEW OF SYSTEMS:     8 point review of system is negative except that mentioned in the subjective portion.    PAST MEDICAL HISTORY:   Past Medical History:   Diagnosis Date    COPD (chronic obstructive pulmonary disease) (HCC)     Diabetes mellitus (HCC)     Thyroid disease        PAST SURGICAL HISTORY:   Past Surgical History:   Procedure Laterality Date    ENDOMETRIAL ABLATION      x 2    OVARY REMOVAL Left     SPINAL FUSION         SOCIAL HISTORY:   Social History     Tobacco Use    Smoking status: Every Day     Current packs/day: 1.00     Average packs/day: 1 pack/day for 44.3 years (44.3 ttl pk-yrs)     Types: Cigarettes     Start date: 1981    Smokeless tobacco: Never   Vaping Use    Vaping status: Never Used   Substance Use Topics    Alcohol use: Never    Drug use: Never       FAMILY HISTORY: History reviewed. No pertinent family history.    MEDICATIONS:     No current facility-administered medications on file prior to encounter.     Current Outpatient Medications on File Prior to Encounter   Medication Sig Dispense Refill    TRELEGY ELLIPTA 100-62.5-25 MCG/ACT AEPB inhaler Inhale 1 puff into the lungs daily      OZEMPIC, 2 MG/DOSE, 8 MG/3ML SOPN sc injection Inject 2 mg into the skin once a week      oxyBUTYnin (DITROPAN XL) 15 MG extended release tablet Take 1 tablet by mouth daily      insulin glargine (LANTUS) 100 UNIT/ML injection vial Inject 20 Units into the skin nightly      hydrOXYzine pamoate (VISTARIL) 25 MG capsule Take 1 capsule by mouth 3 times daily as needed for Itching      levothyroxine 
     PULMONARY AND CRITICAL CARE MEDICINE PROGRESS NOTE    Subjective: Patient sitting in bed in no apparent respiratory distress.  Reports that her breathing is only slightly better.  Remains on AirVo at 40 L/min of flow and 80% FiO2.  Minimal expectoration present.  Still feels chest congestion.  Remains afebrile.  Hyperglycemic today.    REVIEW OF SYSTEMS:     8 point review of system is negative except that mentioned in the subjective portion.    PAST MEDICAL HISTORY:   Past Medical History:   Diagnosis Date    COPD (chronic obstructive pulmonary disease) (HCC)     Diabetes mellitus (HCC)     Thyroid disease        PAST SURGICAL HISTORY:   Past Surgical History:   Procedure Laterality Date    ENDOMETRIAL ABLATION      x 2    OVARY REMOVAL Left     SPINAL FUSION         SOCIAL HISTORY:   Social History     Tobacco Use    Smoking status: Every Day     Current packs/day: 1.00     Average packs/day: 1 pack/day for 44.3 years (44.3 ttl pk-yrs)     Types: Cigarettes     Start date: 1981    Smokeless tobacco: Never   Vaping Use    Vaping status: Never Used   Substance Use Topics    Alcohol use: Never    Drug use: Never       FAMILY HISTORY: History reviewed. No pertinent family history.    MEDICATIONS:     No current facility-administered medications on file prior to encounter.     Current Outpatient Medications on File Prior to Encounter   Medication Sig Dispense Refill    TRELEGY ELLIPTA 100-62.5-25 MCG/ACT AEPB inhaler Inhale 1 puff into the lungs daily      OZEMPIC, 2 MG/DOSE, 8 MG/3ML SOPN sc injection Inject 2 mg into the skin once a week      oxyBUTYnin (DITROPAN XL) 15 MG extended release tablet Take 1 tablet by mouth daily      insulin glargine (LANTUS) 100 UNIT/ML injection vial Inject 20 Units into the skin nightly      hydrOXYzine pamoate (VISTARIL) 25 MG capsule Take 1 capsule by mouth 3 times daily as needed for Itching      levothyroxine (SYNTHROID) 100 MCG tablet TAKE 1 TABLET BY MOUTH DAILY. START 
     PULMONARY AND CRITICAL CARE MEDICINE PROGRESS NOTE    Subjective: Patient sitting in chair in no apparent respiratory distress.  Reporting further improvement in her breathing.  Unfortunately oxygen requirements remains on AirVo at 40 L/min of flow and 80% FiO2.  Minimal expectoration present.  Used BiPAP more last night.  Anxiety better controlled.  Remains afebrile.    REVIEW OF SYSTEMS:     8 point review of system is negative except that mentioned in the subjective portion.    PAST MEDICAL HISTORY:   Past Medical History:   Diagnosis Date    COPD (chronic obstructive pulmonary disease) (HCC)     Diabetes mellitus (HCC)     Thyroid disease        PAST SURGICAL HISTORY:   Past Surgical History:   Procedure Laterality Date    ENDOMETRIAL ABLATION      x 2    OVARY REMOVAL Left     SPINAL FUSION         SOCIAL HISTORY:   Social History     Tobacco Use    Smoking status: Every Day     Current packs/day: 1.00     Average packs/day: 1 pack/day for 44.3 years (44.3 ttl pk-yrs)     Types: Cigarettes     Start date: 1981    Smokeless tobacco: Never   Vaping Use    Vaping status: Never Used   Substance Use Topics    Alcohol use: Never    Drug use: Never       FAMILY HISTORY: History reviewed. No pertinent family history.    MEDICATIONS:     No current facility-administered medications on file prior to encounter.     Current Outpatient Medications on File Prior to Encounter   Medication Sig Dispense Refill    TRELEGY ELLIPTA 100-62.5-25 MCG/ACT AEPB inhaler Inhale 1 puff into the lungs daily      OZEMPIC, 2 MG/DOSE, 8 MG/3ML SOPN sc injection Inject 2 mg into the skin once a week      oxyBUTYnin (DITROPAN XL) 15 MG extended release tablet Take 1 tablet by mouth daily      insulin glargine (LANTUS) 100 UNIT/ML injection vial Inject 20 Units into the skin nightly      hydrOXYzine pamoate (VISTARIL) 25 MG capsule Take 1 capsule by mouth 3 times daily as needed for Itching      levothyroxine (SYNTHROID) 100 MCG tablet TAKE 
    Clinical Pharmacy Note    Ozempic 2mg (8 mg/3 mL) has been brought in from the patient's home supply and it has been verified as the correct product.    Jessica RN has taken the product to the floor and plans to store it in [facility]    ThanksUrbano, PharmD  PGY-1 Pharmacy Resident   [facility]  [id number]   
    Low Risk Nutrition Note     Reason for Visit:   Length of Stay    Nutrition Assessment:  Pt assessed for nutrition risk. Pt with good PO intake, consuming % of most meals. No unintentional weight loss. Skin is in tact. Deemed to be at low nutrition risk at this time.     Current Nutrition Therapies:    ADULT DIET; Regular; 4 carb choices (60 gm/meal)    Anthropometrics:   Current Height: 170.2 cm (5' 7\")  Current Weight - Scale: (!) 142.5 kg (314 lb 2.5 oz)      Monitoring and Evaluation:  No nutrition diagnosis. Patient will be monitored per nutrition standards of care.     Consult Dietitian if nutrition intervention essential to patient care is needed.     Discharge Planning:  No needs    Gissell Casper, MS, RD, LD   
    PULMONARY AND CRITICAL CARE MEDICINE PROGRESS NOTE    Subjective: Continues to have improved shortness of breath and cough.  On 5 to 6 L nasal cannula saturating well.    REVIEW OF SYSTEMS:   Constitutional symptoms: The patient denies fever, fatigue, night sweats, weight loss or weight gain.   HEENT: No vision changes. No tinnitus, Denies sinus pain. No hoarseness, or dysphagia.   Neck: Patient denies swelling in the neck.   Cardiovascular: Denies chest pain, palpitation, syncope.  Respiratory: see above.   Gastrointestinal: Denies nausea, abdominal pain or change in bowel function.  Genitourinary: Denies obstructive symptoms. No history of incontinence.  Skin: No rashes or itching.   Muskuloskeletal: Denies weakness or bone pain.   Neurological: No headaches or seizures.   Psychiatric: Denies mood swings or depression.     MEDICATIONS:     Scheduled Meds:   insulin glargine  18 Units SubCUTAneous BID    furosemide  40 mg Oral Daily    guaiFENesin-dextromethorphan  5 mL Oral Q6H    insulin lispro  10 Units SubCUTAneous TID WC    insulin lispro  0-16 Units SubCUTAneous 4x Daily AC & HS    ipratropium 0.5 mg-albuterol 2.5 mg  1 Dose Inhalation TID RT    pantoprazole  40 mg Oral QAM AC    semaglutide (2 MG/DOSE)  2 mg SubCUTAneous Q7 Days    sodium chloride flush  5-40 mL IntraVENous 2 times per day    levothyroxine  112 mcg Oral QAM AC    arformoterol tartrate  15 mcg Nebulization BID RT    budesonide  0.5 mg Nebulization BID RT    enoxaparin  40 mg SubCUTAneous BID       Current Infusions:    sodium chloride      dextrose         PRN meds:  LORazepam (ATIVAN) 0.5 mg in sodium chloride (PF) 0.9 % 10 mL injection, guaiFENesin-dextromethorphan, hydrOXYzine pamoate, sodium chloride flush, sodium chloride, ondansetron **OR** ondansetron, polyethylene glycol, acetaminophen **OR** acetaminophen, glucagon (rDNA), ipratropium 0.5 mg-albuterol 2.5 mg, dextrose bolus **OR** dextrose bolus, dextrose    PHYSICAL EXAM:  BP 
    PULMONARY AND CRITICAL CARE MEDICINE PROGRESS NOTE    Subjective: Improving shortness of breath and cough..  On 6 L nasal cannula and saturating well.  Working with physical therapy.    REVIEW OF SYSTEMS:   Constitutional symptoms: The patient denies fever, fatigue, night sweats, weight loss or weight gain.   HEENT: No vision changes. No tinnitus, Denies sinus pain. No hoarseness, or dysphagia.   Neck: Patient denies swelling in the neck.   Cardiovascular: Denies chest pain, palpitation, syncope.  Respiratory: See above  Gastrointestinal: Denies nausea, abdominal pain or change in bowel function.  Genitourinary: Denies obstructive symptoms. No history of incontinence.  Skin: No rashes or itching.   Muskuloskeletal: Denies weakness or bone pain.   Neurological: No headaches or seizures.   Psychiatric: Denies mood swings or depression.     MEDICATIONS:     Scheduled Meds:   [START ON 5/2/2025] furosemide  40 mg Oral Daily    guaiFENesin-dextromethorphan  5 mL Oral Q6H    insulin lispro  10 Units SubCUTAneous TID WC    insulin glargine  12 Units SubCUTAneous BID    insulin lispro  0-16 Units SubCUTAneous 4x Daily AC & HS    ipratropium 0.5 mg-albuterol 2.5 mg  1 Dose Inhalation TID RT    [START ON 5/2/2025] predniSONE  5 mg Oral Daily    pantoprazole  40 mg Oral QAM AC    semaglutide (2 MG/DOSE)  2 mg SubCUTAneous Q7 Days    sodium chloride flush  5-40 mL IntraVENous 2 times per day    levothyroxine  112 mcg Oral QAM AC    arformoterol tartrate  15 mcg Nebulization BID RT    budesonide  0.5 mg Nebulization BID RT    enoxaparin  40 mg SubCUTAneous BID       Current Infusions:    sodium chloride      dextrose         PRN meds:  LORazepam (ATIVAN) 0.5 mg in sodium chloride (PF) 0.9 % 10 mL injection, guaiFENesin-dextromethorphan, hydrOXYzine pamoate, sodium chloride flush, sodium chloride, ondansetron **OR** ondansetron, polyethylene glycol, acetaminophen **OR** acetaminophen, glucagon (rDNA), ipratropium 0.5 
    PULMONARY AND CRITICAL CARE MEDICINE PROGRESS NOTE    Subjective: Patient is feeling better today.  Has been weaned down to 15 L nasal cannula and saturating well.  Diuresing well with Lasix.      REVIEW OF SYSTEMS:   Constitutional symptoms: The patient denies fever, fatigue, night sweats, weight loss or weight gain.   HEENT: No vision changes. No tinnitus, Denies sinus pain. No hoarseness, or dysphagia.   Neck: Patient denies swelling in the neck.   Cardiovascular: Denies chest pain, palpitation, syncope.  Respiratory: Improved shortness of breath or cough.   Gastrointestinal: Denies nausea, abdominal pain or change in bowel function.  Genitourinary: Denies obstructive symptoms. No history of incontinence.  Skin: No rashes or itching.   Muskuloskeletal: Denies weakness or bone pain.   Neurological: No headaches or seizures.   Psychiatric: Denies mood swings or depression.     MEDICATIONS:     Scheduled Meds:   insulin lispro  0-16 Units SubCUTAneous 4x Daily AC & HS    ipratropium 0.5 mg-albuterol 2.5 mg  1 Dose Inhalation TID RT    [START ON 4/30/2025] predniSONE  20 mg Oral Daily    Followed by    [START ON 5/1/2025] predniSONE  10 mg Oral Daily    Followed by    [START ON 5/2/2025] predniSONE  5 mg Oral Daily    pantoprazole  40 mg Oral QAM AC    insulin glargine  15 Units SubCUTAneous BID    insulin lispro  0.08 Units/kg SubCUTAneous TID WC    semaglutide (2 MG/DOSE)  2 mg SubCUTAneous Q7 Days    sodium chloride flush  5-40 mL IntraVENous 2 times per day    guaiFENesin  600 mg Oral BID    levothyroxine  112 mcg Oral QAM AC    furosemide  40 mg IntraVENous Daily    cefTRIAXone (ROCEPHIN) 1,000 mg in sterile water 10 mL IV syringe  1,000 mg IntraVENous Q24H    arformoterol tartrate  15 mcg Nebulization BID RT    budesonide  0.5 mg Nebulization BID RT    enoxaparin  40 mg SubCUTAneous BID       Current Infusions:    sodium chloride      dextrose         PRN meds:  LORazepam (ATIVAN) 0.5 mg in sodium chloride 
    PULMONARY AND CRITICAL CARE MEDICINE PROGRESS NOTE    Subjective: Patient remains on heated high flow with 60% FiO2.  States some improvement in shortness of breath and cough.  No wheezing or chest pain or hemoptysis    REVIEW OF SYSTEMS:   Constitutional symptoms: The patient denies fever, fatigue, night sweats, weight loss or weight gain.   HEENT: No vision changes. No tinnitus, Denies sinus pain. No hoarseness, or dysphagia.   Neck: Patient denies swelling in the neck.   Cardiovascular: Denies chest pain, palpitation, syncope.  Respiratory: See above.   Gastrointestinal: Denies nausea, abdominal pain or change in bowel function.  Genitourinary: Denies obstructive symptoms. No history of incontinence.  Skin: No rashes or itching.   Muskuloskeletal: Denies weakness or bone pain.   Neurological: No headaches or seizures.   Psychiatric: Denies mood swings or depression.     MEDICATIONS:     Scheduled Meds:   [START ON 4/29/2025] predniSONE  30 mg Oral Daily    Followed by    [START ON 4/30/2025] predniSONE  20 mg Oral Daily    Followed by    [START ON 5/1/2025] predniSONE  10 mg Oral Daily    Followed by    [START ON 5/2/2025] predniSONE  5 mg Oral Daily    [START ON 4/29/2025] pantoprazole  40 mg Oral QAM AC    ipratropium 0.5 mg-albuterol 2.5 mg  1 Dose Inhalation 4x Daily RT    insulin glargine  15 Units SubCUTAneous BID    insulin lispro  0.08 Units/kg SubCUTAneous TID WC    insulin lispro  0-8 Units SubCUTAneous 4x Daily AC & HS    semaglutide (2 MG/DOSE)  2 mg SubCUTAneous Q7 Days    sodium chloride flush  5-40 mL IntraVENous 2 times per day    guaiFENesin  600 mg Oral BID    levothyroxine  112 mcg Oral QAM AC    furosemide  40 mg IntraVENous Daily    cefTRIAXone (ROCEPHIN) 1,000 mg in sterile water 10 mL IV syringe  1,000 mg IntraVENous Q24H    arformoterol tartrate  15 mcg Nebulization BID RT    budesonide  0.5 mg Nebulization BID RT    enoxaparin  40 mg SubCUTAneous BID       Current Infusions:    sodium 
   04/24/25 0246   RT Protocol   History Pulmonary Disease 2   Respiratory pattern 2   Breath sounds 4   Cough 0   Indications for Bronchodilator Therapy On home bronchodilators   Bronchodilator Assessment Score 8       
   04/24/25 0334   Treatment   Treatment Type HHN;IS   $Treatment Type $Inhaled Therapy/Meds   Medications Albuterol/Ipratropium   Pre-Tx Pulse 96   Pre-Tx Resps 18   Breath Sounds Pre-Tx YONATAN Expiratory wheezes   Breath Sounds Pre-Tx LLL Expiratory wheezes   Breath Sounds Pre-Tx RUL Expiratory wheezes   Breath Sounds Pre-Tx RML Expiratory wheezes   Breath Sounds Pre-Tx RLL Expiratory wheezes   Breath Sounds Post-Tx YONATAN Expiratory wheezes   Breath Sounds Post-Tx LLL Expiratory wheezes   Breath Sounds Post-Tx RUL Expiratory wheezes   Breath Sounds Post-Tx RML Expiratory wheezes   Breath Sounds Post-Tx RLL Expiratory wheezes   Post-Tx Pulse 95   Post-Tx Resps 20   Delivery Source Oxygen;Mouthpiece   Position Semi-Faustina's   Treatment Tolerance Well   Duration 10   Is patient on O2? Y   Oxygen Therapy/Pulse Ox   O2 Therapy Oxygen humidified   O2 Device (S)  Heated high flow cannula  (Started on HHFNC due to desats, will wean as [place].)   O2 Flow Rate (L/min) (S)  40 L/min   FiO2  (S)  65 %   Pulse 92   Respirations 18   SpO2 92 %   Humidification Source Heated wire   Humidification Temp 31   Humidification Temp Measured 31   Pulse Oximeter Device Mode Continuous   Pulse Oximeter Device Location Finger   Incentive Spirometry Tx   Treatment Effort Initial   Predicted Volume 616   Maximum Achieved Volume (mL) 1000 mL   Number of Breaths 2  (unable to do more, SOB and coughing)       
   04/24/25 0553   NIV Type   $NIV $Daily Charge   Ventilator ID [id number]   NIV Started/Stopped On   Equipment Type V60   Mode Bilevel   Mask Type Full face mask   Mask Size Medium   Assessment   Pulse 86   Respirations 24   SpO2 90 %   Level of Consciousness 0   Comfort Level Good   Using Accessory Muscles No   Mask Compliance Good   Skin Assessment Clean, dry, & intact   Settings/Measurements   PIP Observed 15 cm H20   IPAP 14 cmH20   CPAP/EPAP 7 cmH2O   Vt (Measured) 688 mL   Rate Ordered 12   Insp Rise Time (%) 3 %   FiO2  70 %   I Time/ I Time % 1 s   Minute Volume (L/min) 14.2 Liters   Mask Leak (lpm) 5 lpm   Patient's Home Machine No   Alarm Settings   Alarms On Y   Low Pressure (cmH2O) 3 cmH2O   High Pressure (cmH2O) 30 cmH2O   RR Low (bpm) 13   RR High (bpm) 40 br/min     Pt. Placed on Bipap.  
   04/26/25 1200   RT Protocol   History Pulmonary Disease 2   Respiratory pattern 2   Breath sounds 2   Cough 0   Bronchodilator Assessment Score 6       
   04/29/25 1202   RT Protocol   History Pulmonary Disease 2   Respiratory pattern 2   Breath sounds 2   Cough 1   Bronchodilator Assessment Score 7       
   05/02/25 1400   RT Protocol   History Pulmonary Disease 2   Respiratory pattern 2   Breath sounds 2   Cough 0   Bronchodilator Assessment Score 6       
   05/02/25 2356   NIV Type   Ventilator ID [id number]   NIV Started/Stopped On   Equipment Type V60   Mode Bilevel   Mask Type Full face mask   Mask Size Medium   Assessment   Pulse (!) 109   Respirations 23   SpO2 91 %   Breath Sounds   Respiratory Pattern Regular   Breath Sounds Bilateral Diminished   Settings/Measurements   PIP Observed 14 cm H20   IPAP 14 cmH20   CPAP/EPAP 7 cmH2O   Vt (Measured) 771 mL   Rate Ordered 12   FiO2  50 %   I Time/ I Time % 1 s   Minute Volume (L/min) 11.9 Liters   Mask Leak (lpm) 46 lpm   Patient's Home Machine No   Alarm Settings   Alarms On Y   Low Pressure (cmH2O) 3 cmH2O   High Pressure (cmH2O) 30 cmH2O   RR Low (bpm) 13   RR High (bpm) 40 br/min       
   05/07/25 1413   RT Protocol   History Pulmonary Disease 2   Respiratory pattern 2   Breath sounds 2   Cough 0   Bronchodilator Assessment Score 6       
  [facility] - Inpatient Rehabilitation Department   Phone: [phone]    Occupational Therapy    [] Initial Evaluation            [x] Daily Treatment Note         [] Discharge Summary      Patient: Jaden Denise   : 1965   MRN: [id number]   Date of Service:  2025    Admitting Diagnosis:  Pneumonia due to infectious agent    Current Admission Summary: \"Jaden Denise is a 60 y.o. female who presents to the emergency department with shortness of breath ongoing for about 2 weeks, seem to get worse over the last 2 days.  She has been taking Trelegy and albuterol inhalers at home without improvement in her symptoms.  She has a history of COPD.  She denies chest pain but is having a dry cough.  Denies any peripheral edema above her baseline.  Patient apparently was satting 71% on room air, was given DuoNeb, IV steroids and requiring nonrebreather by EMS.\"  Past Medical History:  has a past medical history of COPD (chronic obstructive pulmonary disease) (HCC), Diabetes mellitus (HCC), and Thyroid disease.  Past Surgical History:  has a past surgical history that includes Spinal fusion; Ovary removal (Left); and Endometrial ablation.    Discharge Recommendations: Jaden Denise scored a 15/24 on the AM-PAC ADL Inpatient form. Current research shows that an AM-PAC score of 17 or less is typically not associated with a discharge to the patient's home setting. Based on the patient's AM-PAC score and their current ADL deficits, it is recommended that the patient have 5-7 sessions per week of Occupational Therapy at d/c to increase the patient's independence.  At this time, this patient demonstrates complex nursing, medical, and rehabilitative needs, and would benefit from intensive rehabilitation services upon discharge from the Inpatient setting.  This patient demonstrates the ability to participate in and benefit from an intensive therapy program with a coordinated interdisciplinary team 
  [facility]    Hospitalist Progress Note:      Name:  Jaden Denise /Age/Sex: 1965  (60 y.o. female)   MRN & CSN:  [id number] & [id number] Encounter Date: 2025    Location:  [id number] PCP: No primary care provider on file.     Attending:Humberto Sanford MD  Admission Date: 2025      Hospital Day: 12    Chief Complain/Reason for Admission:  Chief Complaint   Patient presents with    Shortness of Breath     Pt. Started with not being able to get warm, then cough and then SOB. [facility] squad states O2 sats 71% on room air. Pt. Was given a duoneb and placed on 6 l of O2 and sats up to 91%     Assessment:  Jaden Denise is a 60 y.o. female with a PMHx of thyroid disease, COPD, DM2, obesity who presented to the ER for evaluation of a 2-week hx of shortness of breath with associated non-productive cough.   Acute respiratory failure with hypoxia d/t 2,3  Bilateral multifocal pneumonia   COPD with acute exacerbation   Suspected sleep apnea  Adrenal mass   Diabetes type 2 with hyperglycemia: High blood secondary to steroids  Hypothyroid  B/l LE edema  Morbid obesity   Tobacco smoking    Plan:   Continue high flow oxygen, currently on 3  L, continue to wean off as tolerated with target saturation 88 to 90%  changed  to po steroids, nebulized bronchodilators and respiratory therapy.   Finished  ab  and steroid   Add protonix  Cont lasix, monitor lytes, Echo - LVEF 70-75%, normal diastolic function  Adjust basal-bolus insulin, CSIC, check FSBS ACHS, monitor for hypoglycemia   Obtain renal aldosterone assay in process, may need more imaging later  Will need sleep study as outpatient  Advised to quite smoking.  Current meds reviewed, adjusted.   See orders  Diet ADULT DIET; Regular; 4 carb choices (60 gm/meal)   DVT Prophylaxis [x] Lovenox, []  Heparin, [] SCDs, [] Ambulation,  [] Eliquis, [] Xarelto  [] Coumadin   Code Status Full Code   Disposition Expected Disposition: Home 
  [facility]    Hospitalist Progress Note:      Name:  Jaden Denise /Age/Sex: 1965  (60 y.o. female)   MRN & CSN:  [id number] & [id number] Encounter Date: 2025    Location:  [facility]3311/3311-01 PCP: No primary care provider on file.     Attending:Humberto Sanford MD  Admission Date: 2025      Hospital Day: 13    Chief Complain/Reason for Admission:  Chief Complaint   Patient presents with    Shortness of Breath     Pt. Started with not being able to get warm, then cough and then SOB. [facility] squad states O2 sats 71% on room air. Pt. Was given a duoneb and placed on 6 l of O2 and sats up to 91%     Assessment:  Jaden Denise is a 60 y.o. female with a PMHx of thyroid disease, COPD, DM2, obesity who presented to the ER for evaluation of a 2-week hx of shortness of breath with associated non-productive cough.   Stable for dc to ARU, pre cert pending, pt refusing snf    Acute respiratory failure with hypoxia d/t 2,3  Bilateral multifocal pneumonia   COPD with acute exacerbation   Suspected sleep apnea  Adrenal   mass, left :  could  be adenoma  Diabetes type 2 with hyperglycemia: High blood secondary to steroids  Hypothyroid  B/l LE edema  Morbid obesity   Tobacco smoking    Plan:   Continue high flow oxygen, currently on 3  L, continue to wean off as tolerated with target saturation 88 to 90%  changed  to po steroids, nebulized bronchodilators and respiratory therapy.   Finished  ab  and steroid   Add protonix  Cont lasix, monitor lytes, Echo - LVEF 70-75%, normal diastolic function  Adjust basal-bolus insulin, CSIC, check FSBS ACHS, monitor for hypoglycemia   renal aldosterone assay in process, obtain am cortisol , if elevated  may  need more outpatient workup including suppression test and further imaging,pt does have cushingoid features  Will need sleep study as outpatient  Advised to quite smoking.  Current meds reviewed, adjusted.   See orders  Diet ADULT DIET; Regular; 
  [facility]    Hospitalist Progress Note:      Name:  Jaden Denise /Age/Sex: 1965  (60 y.o. female)   MRN & CSN:  [id number] & [id number] Encounter Date: 2025    Location:  [facility]3903/3903-01 PCP: No primary care provider on file.     Attending:Adria Reagan MD  Admission Date: 2025      Hospital Day: 6    Chief Complain/Reason for Admission:  Chief Complaint   Patient presents with    Shortness of Breath     Pt. Started with not being able to get warm, then cough and then SOB. [facility] squad states O2 sats 71% on room air. Pt. Was given a duoneb and placed on 6 l of O2 and sats up to 91%     Assessment:  Jaden Denise is a 60 y.o. female with a PMHx of thyroid disease, COPD, DM2, obesity who presented to the ER for evaluation of a 2-week hx of shortness of breath with associated non-productive cough.   Acute respiratory failure with hypoxia d/t 2,3  Bilateral multifocal pneumonia   COPD with acute exacerbation   Adrenal mass   Diabetes type 2 with hyperglycemia: High blood secondary to steroids  Hypothyroid  B/l LE edema  Morbid obesity   Tobacco smoking    Plan:   Continue BiPAP, wean oxygen as able for O2 saturation more than 90  Change IV to posteroids, nebulized bronchodilators and respiratory therapy.   Cont on IV antibiotics, follow-up cultures  Add protonix  Wean oxygen for O2 sat >90 %.  Cont lasix, monitor lytes, Echo - LVEF 70-75%, normal diastolic function  Adjust basal-bolus insulin, CSIC, check FSBS ACHS, monitor for hypoglycemia   CT abdomen pelvis to evaluate for adrenal mass.  Advised to quite smoking.  Current meds reviewed, adjusted.   See orders  Diet ADULT DIET; Regular; 4 carb choices (60 gm/meal)   DVT Prophylaxis [x] Lovenox, []  Heparin, [] SCDs, [] Ambulation,  [] Eliquis, [] Xarelto  [] Coumadin   Code Status Full Code   Disposition Expected Disposition: Home vs ECF vs Hm with HHC  Estimated Date of Discharge:  1-2 days  Reason for 
  [facility]    Hospitalist Progress Note:      Name:  Jaden Denise /Age/Sex: 1965  (60 y.o. female)   MRN & CSN:  [id number] & [id number] Encounter Date: 2025    Location:  [facility]3903/3903-01 PCP: No primary care provider on file.     Attending:Adria Reagan MD  Admission Date: 2025      Hospital Day: 5    Chief Complain/Reason for Admission:  Chief Complaint   Patient presents with    Shortness of Breath     Pt. Started with not being able to get warm, then cough and then SOB. [facility] squad states O2 sats 71% on room air. Pt. Was given a duoneb and placed on 6 l of O2 and sats up to 91%     Assessment:  Jaden Denise is a 60 y.o. female with a PMHx of thyroid disease, COPD, DM2, obesity who presented to the ER for evaluation of a 2-week hx of shortness of breath with associated non-productive cough.   Acute respiratory failure with hypoxia d/t 2,3  Bilateral multifocal pneumonia   COPD with acute exacerbation   Adrenal mass   Diabetes type 2 with hyperglycemia: High blood secondary to steroids  Hypothyroid  B/l LE edema  Morbid obesity   Tobacco smoking    Plan:   Continue BiPAP, wean oxygen as able for O2 saturation more than 90  Cont on IV antibiotics, follow-up cultures  Cont on IV steroids, nebulized bronchodilators and respiratory therapy.   Wean oxygen for O2 sat >90 %.  Cont lasix, monitor lytes, consider echo  Adjust basal-bolus insulin, CSIC, check FSBS ACHS, monitor for hypoglycemia   CT abdomen pelvis to evaluate for adrenal mass.  Advised to quite smoking.  Current meds reviewed, adjusted.   See orders  Diet ADULT DIET; Regular; 4 carb choices (60 gm/meal)   DVT Prophylaxis [x] Lovenox, []  Heparin, [] SCDs, [] Ambulation,  [] Eliquis, [] Xarelto  [] Coumadin   Code Status Full Code   Disposition Expected Disposition: Home vs ECF vs Hm with HHC  Estimated Date of Discharge:  1-2 days  Reason for continued admission:PNA, COPD     Subjective:  Pt. seen 
  [facility] - Inpatient Rehabilitation Department   Phone: [phone]    Physical Therapy    [] Initial Evaluation            [x] Daily Treatment Note         [] Discharge Summary      Patient: Jaden Denise   : 1965   MRN: [id number]   Date of Service:  2025  Admitting Diagnosis: Pneumonia due to infectious agent  Current Admission Summary: Jaden Denise is a 60 y.o. female who presents to the emergency department with shortness of breath ongoing for about 2 weeks, seem to get worse over the last 2 days.  She has been taking Trelegy and albuterol inhalers at home without improvement in her symptoms.  She has a history of COPD.  She denies chest pain but is having a dry cough.  Denies any peripheral edema above her baseline.  Patient apparently was satting 71% on room air, was given DuoNeb, IV steroids and requiring nonrebreather by EMS.  Past Medical History:  has a past medical history of COPD (chronic obstructive pulmonary disease) (HCC), Diabetes mellitus (HCC), and Thyroid disease.  Past Surgical History:  has a past surgical history that includes Spinal fusion; Ovary removal (Left); and Endometrial ablation.  Discharge Recommendations: Jaden Denise scored a 16/24 on the AM-PAC short mobility form. Current research shows that an AM-PAC score of 17 or less is typically not associated with a discharge to the patient's home setting. Based on the patient's AM-PAC score and their current functional mobility deficits, it is recommended that the patient have 5-7 sessions per week of Physical Therapy at d/c to increase the patient's independence.  At this time, this patient demonstrates complex nursing, medical, and rehabilitative needs, and would benefit from intensive rehabilitation services upon discharge from the Inpatient setting.  This patient demonstrates the ability to participate in and benefit from an intensive therapy program with a coordinated interdisciplinary team 
  [facility]    Hospitalist Progress Note:      Name:  Jaden Denise /Age/Sex: 1965  (60 y.o. female)   MRN & CSN:  [id number] & [id number] Encounter Date: 2025    Location:  [facility]3903/3903-01 PCP: No primary care provider on file.     Attending:Humberto Sanford MD  Admission Date: 2025      Hospital Day: 9    Chief Complain/Reason for Admission:  Chief Complaint   Patient presents with    Shortness of Breath     Pt. Started with not being able to get warm, then cough and then SOB. [facility] squad states O2 sats 71% on room air. Pt. Was given a duoneb and placed on 6 l of O2 and sats up to 91%     Assessment:  Jaden Denise is a 60 y.o. female with a PMHx of thyroid disease, COPD, DM2, obesity who presented to the ER for evaluation of a 2-week hx of shortness of breath with associated non-productive cough.   Acute respiratory failure with hypoxia d/t 2,3  Bilateral multifocal pneumonia   COPD with acute exacerbation   Adrenal mass   Diabetes type 2 with hyperglycemia: High blood secondary to steroids  Hypothyroid  B/l LE edema  Morbid obesity   Tobacco smoking    Plan:   Continue high flow oxygen, currently on 6  L, continue to wean off as tolerated  changed  to po steroids, nebulized bronchodilators and respiratory therapy.   Cont on IV antibiotics to complete course , follow-up cultures ng  Add protonix  Wean oxygen for O2 sat >90 %.  Cont lasix, monitor lytes, Echo - LVEF 70-75%, normal diastolic function  Adjust basal-bolus insulin, CSIC, check FSBS ACHS, monitor for hypoglycemia   Obtain renal aldosterone assay, may need more imaging later  Advised to quite smoking.  Current meds reviewed, adjusted.   See orders  Diet ADULT DIET; Regular; 4 carb choices (60 gm/meal)   DVT Prophylaxis [x] Lovenox, []  Heparin, [] SCDs, [] Ambulation,  [] Eliquis, [] Xarelto  [] Coumadin   Code Status Full Code   Disposition Expected Disposition: Home vs ECF vs Hm with HHC  Estimated 
  [facility]    Hospitalist Progress Note:      Name:  Jaden Denise /Age/Sex: 1965  (60 y.o. female)   MRN & CSN:  [id number] & [id number] Encounter Date: 5/3/2025    Location:  [facility]3903/3903-01 PCP: No primary care provider on file.     Attending:Humberto Sanford MD  Admission Date: 2025      Hospital Day: 11    Chief Complain/Reason for Admission:  Chief Complaint   Patient presents with    Shortness of Breath     Pt. Started with not being able to get warm, then cough and then SOB. [facility] squad states O2 sats 71% on room air. Pt. Was given a duoneb and placed on 6 l of O2 and sats up to 91%     Assessment:  Jaden Denise is a 60 y.o. female with a PMHx of thyroid disease, COPD, DM2, obesity who presented to the ER for evaluation of a 2-week hx of shortness of breath with associated non-productive cough.   Acute respiratory failure with hypoxia d/t 2,3  Bilateral multifocal pneumonia   COPD with acute exacerbation   Suspected sleep apnea  Adrenal mass   Diabetes type 2 with hyperglycemia: High blood secondary to steroids  Hypothyroid  B/l LE edema  Morbid obesity   Tobacco smoking    Plan:   Continue high flow oxygen, currently on 3  L, continue to wean off as tolerated with target saturation 88 to 90%  changed  to po steroids, nebulized bronchodilators and respiratory therapy.   Finished  ab  and steroid   Add protonix  Cont lasix, monitor lytes, Echo - LVEF 70-75%, normal diastolic function  Adjust basal-bolus insulin, CSIC, check FSBS ACHS, monitor for hypoglycemia   Obtain renal aldosterone assay in process, may need more imaging later  Will need sleep study as outpatient  Advised to quite smoking.  Current meds reviewed, adjusted.   See orders  Diet ADULT DIET; Regular; 4 carb choices (60 gm/meal)   DVT Prophylaxis [x] Lovenox, []  Heparin, [] SCDs, [] Ambulation,  [] Eliquis, [] Xarelto  [] Coumadin   Code Status Full Code   Disposition Expected Disposition: Home 
  [facility] - Inpatient Rehabilitation Department   Phone: [phone]    Occupational Therapy    [] Initial Evaluation            [x] Daily Treatment Note         [] Discharge Summary      Patient: Jaden Denise   : 1965   MRN: [id number]   Date of Service:  2025    Admitting Diagnosis:  Pneumonia due to infectious agent    Current Admission Summary: \"Jaden Denise is a 60 y.o. female who presents to the emergency department with shortness of breath ongoing for about 2 weeks, seem to get worse over the last 2 days.  She has been taking Trelegy and albuterol inhalers at home without improvement in her symptoms.  She has a history of COPD.  She denies chest pain but is having a dry cough.  Denies any peripheral edema above her baseline.  Patient apparently was satting 71% on room air, was given DuoNeb, IV steroids and requiring nonrebreather by EMS.\"  Past Medical History:  has a past medical history of COPD (chronic obstructive pulmonary disease) (HCC), Diabetes mellitus (HCC), and Thyroid disease.  Past Surgical History:  has a past surgical history that includes Spinal fusion; Ovary removal (Left); and Endometrial ablation.    Discharge Recommendations: Jaden Denise scored a 16/24 on the AM-PAC ADL Inpatient form. Current research shows that an AM-PAC score of 17 or less is typically not associated with a discharge to the patient's home setting. Based on the patient's AM-PAC score and their current ADL deficits, it is recommended that the patient have 5-7 sessions per week of Occupational Therapy at d/c to increase the patient's independence.  At this time, this patient demonstrates complex nursing, medical, and rehabilitative needs, and would benefit from intensive rehabilitation services upon discharge from the Inpatient setting.  This patient demonstrates the ability to participate in and benefit from an intensive therapy program with a coordinated interdisciplinary team 
  [facility] - Inpatient Rehabilitation Department   Phone: [phone]    Physical Therapy    [] Initial Evaluation            [x] Daily Treatment Note         [] Discharge Summary      Patient: Jaden Denise   : 1965   MRN: [id number]   Date of Service:  2025  Admitting Diagnosis: Pneumonia due to infectious agent  Current Admission Summary: Jaden Denise is a 60 y.o. female who presents to the emergency department with shortness of breath ongoing for about 2 weeks, seem to get worse over the last 2 days.  She has been taking Trelegy and albuterol inhalers at home without improvement in her symptoms.  She has a history of COPD.  She denies chest pain but is having a dry cough.  Denies any peripheral edema above her baseline.  Patient apparently was satting 71% on room air, was given DuoNeb, IV steroids and requiring nonrebreather by EMS.  Past Medical History:  has a past medical history of COPD (chronic obstructive pulmonary disease) (HCC), Diabetes mellitus (HCC), and Thyroid disease.  Past Surgical History:  has a past surgical history that includes Spinal fusion; Ovary removal (Left); and Endometrial ablation.  Discharge Recommendations: Jaden Denise scored a 16/24 on the AM-PAC short mobility form. Current research shows that an AM-PAC score of 17 or less is typically not associated with a discharge to the patient's home setting. Based on the patient's AM-PAC score and their current functional mobility deficits, it is recommended that the patient have 5-7 sessions per week of Physical Therapy at d/c to increase the patient's independence.  At this time, this patient demonstrates complex nursing, medical, and rehabilitative needs, and would benefit from intensive rehabilitation services upon discharge from the Inpatient setting.  This patient demonstrates the ability to participate in and benefit from an intensive therapy program with a coordinated interdisciplinary team 
  [facility]    Hospitalist Progress Note:      Name:  Jaden Denise /Age/Sex: 1965  (60 y.o. female)   MRN & CSN:  [id number] & [id number] Encounter Date: 2025    Location:  [facility]3903/3903-01 PCP: No primary care provider on file.     Attending:Adria Reagan MD  Admission Date: 2025      Hospital Day: 3    Chief Complain/Reason for Admission:  Chief Complaint   Patient presents with    Shortness of Breath     Pt. Started with not being able to get warm, then cough and then SOB. [facility] squad states O2 sats 71% on room air. Pt. Was given a duoneb and placed on 6 l of O2 and sats up to 91%     Assessment:  Jaden Denise is a 60 y.o. female with a PMHx of thyroid disease, COPD, DM2, obesity who presented to the ER for evaluation of a 2-week hx of shortness of breath with associated non-productive cough.   Acute respiratory failure with hypoxia 2/2   Bilateral multifocal pneumonia   COPD with acute exacerbation   Adrenal mass   Diabetes type 2 with hyperglycemia: High blood secondary to steroids  Hypothyroid  Morbid obesity   Tobacco smoking    Plan:   Continue BiPAP, wean oxygen as able for O2 saturation more than 90  Cont on IV steroids, empirical abx, nebulized bronchodilators and respiratory therapy.   Wean oxygen for O2 sat >90 %.  Adjust basal-bolus insulin, CSIC, check FSBS ACHS, monitor for hypoglycemia   CT abdomen pelvis to evaluate for adrenal mass.  Advised to quite smoking.  Current meds reviewed, adjusted.   See orders  Diet ADULT DIET; Regular; 4 carb choices (60 gm/meal)   DVT Prophylaxis [x] Lovenox, []  Heparin, [] SCDs, [] Ambulation,  [] Eliquis, [] Xarelto  [] Coumadin   Code Status Full Code   Disposition Expected Disposition: Home vs ECF vs Hm with [facility]  Estimated Date of Discharge:  1-2 days  Reason for continued admission:PNA, COPD     A total critical care time 35 minutes was used. This includes but not limited to examining patient, 
  [facility]    Hospitalist Progress Note:      Name:  Jaden Denise /Age/Sex: 1965  (60 y.o. female)   MRN & CSN:  [id number] & [id number] Encounter Date: 2025    Location:  [facility]3903/3903-01 PCP: No primary care provider on file.     Attending:Humberto Sanford MD  Admission Date: 2025      Hospital Day: 7    Chief Complain/Reason for Admission:  Chief Complaint   Patient presents with    Shortness of Breath     Pt. Started with not being able to get warm, then cough and then SOB. [facility] squad states O2 sats 71% on room air. Pt. Was given a duoneb and placed on 6 l of O2 and sats up to 91%     Assessment:  Jaden Denise is a 60 y.o. female with a PMHx of thyroid disease, COPD, DM2, obesity who presented to the ER for evaluation of a 2-week hx of shortness of breath with associated non-productive cough.   Acute respiratory failure with hypoxia d/t 2,3  Bilateral multifocal pneumonia   COPD with acute exacerbation   Adrenal mass   Diabetes type 2 with hyperglycemia: High blood secondary to steroids  Hypothyroid  B/l LE edema  Morbid obesity   Tobacco smoking    Plan:   Continue high flow oxygen, currently on 15 L, continue to wean off as tolerated  change  to po steroids, nebulized bronchodilators and respiratory therapy.   Cont on IV antibiotics, follow-up cultures  Add protonix  Wean oxygen for O2 sat >90 %.  Cont lasix, monitor lytes, Echo - LVEF 70-75%, normal diastolic function  Adjust basal-bolus insulin, CSIC, check FSBS ACHS, monitor for hypoglycemia   CT abdomen pelvis to evaluate for adrenal mass.  Advised to quite smoking.  Current meds reviewed, adjusted.   See orders  Diet ADULT DIET; Regular; 4 carb choices (60 gm/meal)   DVT Prophylaxis [x] Lovenox, []  Heparin, [] SCDs, [] Ambulation,  [] Eliquis, [] Xarelto  [] Coumadin   Code Status Full Code   Disposition Expected Disposition: Home vs ECF vs Hm with HHC  Estimated Date of Discharge:  1-2 days  Reason 
  [facility] - Inpatient Rehabilitation Department   Phone: [phone]    Physical Therapy    [] Initial Evaluation            [x] Daily Treatment Note         [] Discharge Summary      Patient: Jaden Denise   : 1965   MRN: [id number]   Date of Service:  2025  Admitting Diagnosis: Pneumonia due to infectious agent  Current Admission Summary: Jaden Denise is a 60 y.o. female who presents to the emergency department with shortness of breath ongoing for about 2 weeks, seem to get worse over the last 2 days.  She has been taking Trelegy and albuterol inhalers at home without improvement in her symptoms.  She has a history of COPD.  She denies chest pain but is having a dry cough.  Denies any peripheral edema above her baseline.  Patient apparently was satting 71% on room air, was given DuoNeb, IV steroids and requiring nonrebreather by EMS.  Past Medical History:  has a past medical history of COPD (chronic obstructive pulmonary disease) (HCC), Diabetes mellitus (HCC), and Thyroid disease.  Past Surgical History:  has a past surgical history that includes Spinal fusion; Ovary removal (Left); and Endometrial ablation.  Discharge Recommendations: Jaden Denise scored a 16/24 on the AM-PAC short mobility form. Current research shows that an AM-PAC score of 17 or less is typically not associated with a discharge to the patient's home setting. Based on the patient's AM-PAC score and their current functional mobility deficits, it is recommended that the patient have 5-7 sessions per week of Physical Therapy at d/c to increase the patient's independence.  At this time, this patient demonstrates complex nursing, medical, and rehabilitative needs, and would benefit from intensive rehabilitation services upon discharge from the Inpatient setting.  This patient demonstrates the ability to participate in and benefit from an intensive therapy program with a coordinated interdisciplinary team 
  [facility] - Inpatient Rehabilitation Department   Phone: [phone]    Occupational Therapy    [x] Initial Evaluation            [] Daily Treatment Note         [] Discharge Summary      Patient: Jaden Denise   : 1965   MRN: [id number]   Date of Service:  2025    Admitting Diagnosis:  Pneumonia due to infectious agent  Current Admission Summary: \"Jaden Denise is a 60 y.o. female who presents to the emergency department with shortness of breath ongoing for about 2 weeks, seem to get worse over the last 2 days.  She has been taking Trelegy and albuterol inhalers at home without improvement in her symptoms.  She has a history of COPD.  She denies chest pain but is having a dry cough.  Denies any peripheral edema above her baseline.  Patient apparently was satting 71% on room air, was given DuoNeb, IV steroids and requiring nonrebreather by EMS.\"  Past Medical History:  has a past medical history of COPD (chronic obstructive pulmonary disease) (HCC), Diabetes mellitus (HCC), and Thyroid disease.  Past Surgical History:  has a past surgical history that includes Spinal fusion; Ovary removal (Left); and Endometrial ablation.    Discharge Recommendations: Jaden Denise scored a 14/24 on the AM-PAC ADL Inpatient form. Current research shows that an AM-PAC score of 17 or less is typically not associated with a discharge to the patient's home setting. Based on the patient's AM-PAC score and their current ADL deficits, it is recommended that the patient have 5-7 sessions per week of Occupational Therapy at d/c to increase the patient's independence.  At this time, this patient demonstrates complex nursing, medical, and rehabilitative needs, and would benefit from intensive rehabilitation services upon discharge from the Inpatient setting.  This patient demonstrates the ability to participate in and benefit from an intensive therapy program with a coordinated interdisciplinary team approach 
4 Eyes Skin Assessment     NAME:  Jaden Denise  YOB: 1965  MEDICAL RECORD NUMBER:  [id number]    The patient is being assessed for  Admission    I agree that at least one RN has performed a thorough Head to Toe Skin Assessment on the patient. ALL assessment sites listed below have been assessed.      Areas assessed by both nurses:    Head, Face, Ears, Shoulders, Back, Chest, Arms, Elbows, Hands, Sacrum. Buttock, Coccyx, Ischium, Legs. Feet and Heels, and Under Medical Devices         Does the Patient have a Wound? No noted wound(s)       Riky Prevention initiated by RN: No  Wound Care Orders initiated by RN: No    Pressure Injury (Stage 3,4, Unstageable, DTI, NWPT, and Complex wounds) if present, place Wound referral order by RN under : No    New Ostomies, if present place, Ostomy referral order under : No     Nurse 1 eSignature: Electronically signed by Connie Allen RN on 4/24/25 at 3:38 AM EDT    **SHARE this note so that the co-signing nurse can place an eSignature**    Nurse 2 eSignature: {Esignature:[id number]}    
Advance Care Planning     Advance Care Planning Inpatient Note  Spiritual Care Department    Today's Date: 4/24/2025  Unit: [facility]    Received request from IDT Member, Connie Allen RN.  Upon review of chart and communication with care team, patient's decision making abilities are not in question.. Patient was/were present in the room during visit.    Goals of ACP Conversation:  Discuss advance care planning documents  Facilitate a discussion related to patient's goals of care as they align with the patient's values and beliefs.    Health Care Decision Makers:     No healthcare decision makers have been documented.    Summary:  No Decision Maker named by patient at this time    Advance Care Planning Documents (Patient Wishes):  None     Assessment:  Spiritual Health consult to facilitate completion of Health Care Power of  and Living Will. Pt was awake and alert. There is no  medical, psychological, psychosocial, family systems, ethical, cultural, societal and spiritual barriers observed at this time.  Provided support through active listen, affirmed thoughts, concerns, conversation, and blessing.                                                                                                                                                                                 Interventions:  Provided education on documents for clarity and greater understanding  Discussed and provided education on state decision maker hierarchy  Encouraged ongoing ACP conversation with future decision makers and loved ones  Reviewed but did not complete ACP document    Care Preferences Communicated:   No    Outcomes/Plan:  ACP Discussion: Postponed. Pt requested to review/complete POA/LW documents with son today. Pt's son will be coming to visit pt later during the day. Pt will notify the nurse to contact [facility] when appropriate. Pt expressed hope and gratitude.    Electronically signed by Chaplain Jose 
CLINICAL PHARMACY NOTE: MEDS TO BEDS    Total # of Prescriptions Filled: 1   The following medications were delivered to the patient:  Gabapentin  Entresto 49-51  torsemide    Additional Documentation:  Terrell miller picked up  
COMPLEX ROUNDS CHECKLIST    C Comfort - Pain Management/Sedation [] Propofol ( )  [] Fentanyl ( )  [] Precedex ( )  [] Versed ( )  [] PRN Pain Medication  [x] N/A   O Organisms   *Active isolation*   [] None  [] ABX (Days on abx **)   M Mechanical Ventilation/Oxygenation [x]O2 Device: Heated high flow cannula       O2 Flow Rate (L/min): 40 L/min   FiO2 : 81 %      []Spontaneous Awake Trial: N/A  []Spontaneous Breathing Trial: N/A     P Prophylaxis [] GI   [] VTE    [] Bowel regimen ( )   L Lines/Labs [] Central Venous Access: No Central Lines (Line Days:0)  [] No Central Lines (Line Days:0  )  [] Urinary Catheter: None (Urinary Catheter Days: 0  )   [] Routine Labs ordered for next day   E Enteral/Parenteral Nutrition [] Tube feed (Rate **, Goal **)  [] TPN   [x] PO Diet  [] NPO  [] Awaiting/Following recommendation from SLP   X X-ray (Do we need one tomorrow?)   [x] CXR   [] No   C  A  R  E Care Team Team Members Present During Rounds: Primary RN, Charge RN, Critical Care Provider, Pharmacist, and Respiratory Therapist   Daily Goals/Plan of the day  Encourage Bipap, decrease solu medrol dose to once a day    This note is completed during interdisciplinary rounds in collaboration with the provider. Please see the critical care and/or hospitalist note for additional clarification.    
COMPLEX ROUNDS CHECKLIST    C Comfort - Pain Management/Sedation [] Propofol ( )  [] Fentanyl ( )  [] Precedex ( )  [] Versed ( )  [] PRN Pain Medication  [x] N/A   O Organisms   *Active isolation*   [x] None  [] ABX (Days on abx **)   M Mechanical Ventilation/Oxygenation [x]O2 Device: PAP (positive airway pressure)       O2 Flow Rate (L/min): 40 L/min   FiO2 : 70 %      []Spontaneous Awake Trial:   []Spontaneous Breathing Trial:      P Prophylaxis [] GI   [] VTE    [] Bowel regimen (Last BM (including prior to admit): 04/26/25)   L Lines/Labs [] Central Venous Access: No Central Lines (Line Days:0)  [] No Central Lines (Line Days:0)  [x] Urinary Catheter: Indwelling (Urinary Catheter Days: 1)   [] Routine Labs ordered for next day   E Enteral/Parenteral Nutrition [] Tube feed (Rate **, Goal **)  [] TPN   [x] PO Diet  [] NPO  [] Awaiting/Following recommendation from SLP   X X-ray (Do we need one tomorrow?)   [] CXR   [] No   C  A  R  E Care Team Team Members Present During Rounds:Primary RN, Charge RN, Critical Care Provider, Pharmacist   Daily Goals/Plan of the day  Titrate O2 as able  Decrease solumedrol     This note is completed during interdisciplinary rounds in collaboration with the provider. Please see the critical care and/or hospitalist note for additional clarification.    
COMPLEX ROUNDS CHECKLIST    C Comfort - Pain Management/Sedation [] Propofol ( )  [] Fentanyl ( )  [] Precedex ( )  [] Versed ( )  [] PRN Pain Medication  [x] N/A   O Organisms   *Active isolation*   [x] None  [] ABX (Days on abx 0)   M Mechanical Ventilation/Oxygenation []O2 Device: High flow nasal cannula       O2 Flow Rate (L/min): 6 L/min   FiO2 : 35 %      []Spontaneous Awake Trial: N/A  []Spontaneous Breathing Trial: N/A     P Prophylaxis [] GI   [] VTE    [x] Bowel regimen (Last BM (including prior to admit): 04/30/25)   L Lines/Labs [] Central Venous Access: No Central Lines (Line Days:0  )0)  [x] Urinary Catheter: Indwelling (Urinary Catheter Days: 2  )   [] Routine Labs ordered for next day   E Enteral/Parenteral Nutrition [] Tube feed (Rate **, Goal **)  [] TPN   [x] PO Diet  [] NPO  [] Awaiting/Following recommendation from SLP   X X-ray (Do we need one tomorrow?)   [] CXR   [x] No   C  A  R  E Care Team Team Members Present During Rounds: Primary RN, Charge RN, Critical Care Provider, Pharmacist, and Respiratory Therapist   Daily Goals/Plan of the day  Antibiotics completed, change Lasix to PO, work with PT/OT     This note is completed during interdisciplinary rounds in collaboration with the provider. Please see the critical care and/or hospitalist note for additional clarification.    
COMPLEX ROUNDS CHECKLIST    C Comfort - Pain Management/Sedation [] Propofol ( )  [] Fentanyl ( )  [] Precedex ( )  [] Versed ( )  [] PRN Pain Medication  [x] N/A   O Organisms   *Active isolation*   [x] None  [] ABX (Days on abx 0)   M Mechanical Ventilation/Oxygenation [x]O2 Device: High flow nasal cannula       O2 Flow Rate (L/min): 6 L/min   FiO2 : 35 %      []Spontaneous Awake Trial: N/A  []Spontaneous Breathing Trial: N/A     P Prophylaxis [] GI   [] VTE    [] Bowel regimen (Last BM (including prior to admit): 05/01/25)   L Lines/Labs [] Central Venous Access: No Central Lines (Line Days:0  )  [] No Central Lines (Line Days:0  )  [x] Urinary Catheter: None (Urinary Catheter Days: 0)   [] Routine Labs ordered for next day   E Enteral/Parenteral Nutrition [] Tube feed (Rate **, Goal **)  [] TPN   [x] PO Diet  [] NPO  [] Awaiting/Following recommendation from SLP   X X-ray (Do we need one tomorrow?)   [] CXR   [x] No   C  A  R  E Care Team Team Members Present During Rounds: Primary RN, Charge RN, Critical Care Provider, Pharmacist, and Dietician   Daily Goals/Plan of the day  Continue to titrate O2 down, Ok for discharge to ARU    This note is completed during interdisciplinary rounds in collaboration with the provider. Please see the critical care and/or hospitalist note for additional clarification.    
COMPLEX ROUNDS CHECKLIST    C Comfort - Pain Management/Sedation [] Propofol ( )  [] Fentanyl ( )  [] Precedex ( )  [] Versed ( )  [] PRN Pain Medication  [x] N/A   O Organisms   *Active isolation*   [x] None  [] ABX (Days on abx 3)?   M Mechanical Ventilation/Oxygenation [x]O2 Device: Heated high flow cannula       O2 Flow Rate (L/min): 40 L/min   FiO2 : 70 % (weaned by RN)      []Spontaneous Awake Trial:   []Spontaneous Breathing Trial:      P Prophylaxis [] GI   [x] VTE    [] Bowel regimen (Last BM (including prior to admit): 04/26/25)   L Lines/Labs [] Central Venous Access: No Central Lines (Line Days:0)  [] No Central Lines (Line Days:0)  [x] Urinary Catheter: Indwelling (Urinary Catheter Days: 3)   [] Routine Labs ordered for next day   E Enteral/Parenteral Nutrition [] Tube feed (Rate **, Goal **)  [] TPN   [x] PO Diet  [] NPO  [] Awaiting/Following recommendation from SLP   X X-ray (Do we need one tomorrow?)   [] CXR   [] No   C  A  R  E Care Team Team Members Present During Rounds: Primary RN, Charge RN, Critical Care Provider, Pharmacist, Dietician, and Respiratory Therapist   Daily Goals/Plan of the day  Encourage Bipap, wean O2 as able      This note is completed during interdisciplinary rounds in collaboration with the provider. Please see the critical care and/or hospitalist note for additional clarification.    
Incentive Spirometry education and demonstration completed by Respiratory Therapy Yes      Response to education: Very Good     Teaching Time: 15 minutes    Minimum Predicted Vital Capacity - 616 mL.  Patient's Actual Vital Capacity - 1000 mL. Turning over to Nursing for routine follow-up Yes.    Comments: IS goal met    Electronically signed by Sunday Lazaro RCP on 4/24/2025 at 5:26 PM   
NUTRITION    Nutrition screening referral was triggered based on results obtained during nursing admission assessment for Unintentional Weight Loss and Decreased appetite.    The patient's chart was reviewed and nutrition assessment is not indicated at this time.  Patient will be seen per nutrition standards of care.     Gissell Casper MS, RD, LD   
Patient awake, alert, and oriented. VSS, afebrile. Denies pain. Discomfort noted with urinating, UA w/ reflex ordered and results pending. Patient refused up to the chair today. Excellent PO food/fluids. Urine output after Lasix 2 Liters. She has been using her flutter valve throughout the day.   Patient on room air 92%.  
Patient belongings taken to [address] [place] [address] and handoff report given to receiving nurse at the bedside. Patient's dinner arrived and patient eating with son at her bedside. She has requested to finish her dinner.   
Patient extubated per MD order, 3l nc =95%, , BS clear no stridor noted.  
Patient transferred to  3359 per bed per transporter. Room checked for any belongings left behind.   
Patient with discharge orders. Patient to transfer to ARU 4910. Patient  report called to VALERIO Montano. Patient with peripheral IV in place. Patient with all belongings. Patient on oxygen 3LNC. Patient with transport, transfer complete.   
Pt admitted to ICU room #3903. Alert and oriented. Able to follow commands. Currently on 15 L of oxygen on high flow with oxygen saturation around 87-90. Plan to place pt on airvo. Oriented pt to the call light system. All safety measures stay active.   
Pt did not want to wear the bipap.  
Pt feeling SOB upon arrival to 3T, oxygen 80-82% on RA. Pt placed back on 3L oxygen with oxygen saturation 91-93%.   
Pt refusing bath. Also refusing to go back to bed from chair (to get morning weight) at this time.  
Water added to high flow oxygen set up.  
Weaned oxygen overnight, patient tolerated well. On 15L HFNC, denies SOB or difficulties breathing this morning. Plan of care on going  
\"TRICHOMONAS\", \"YEAST\", \"BACTERIA\", \"CLARITYU\", \"SPECGRAV\", \"LEUKOCYTESUR\", \"UROBILINOGEN\", \"BILIRUBINUR\", \"BLOODU\", \"GLUCOSEU\", \"KETUA\", \"AMORPHOUS\"    Imaging Studies:  Reports reviewed.  CT CHEST PULMONARY EMBOLISM W CONTRAST  Result Date: 4/23/2025  EXAMINATION: CTA OF THE CHEST 4/23/2025 9:59 pm TECHNIQUE: CTA of the chest was performed after the administration of intravenous contrast.  Multiplanar reformatted images are provided for review.  MIP images are provided for review. Automated exposure control, iterative reconstruction, and/or weight based adjustment of the mA/kV was utilized to reduce the radiation dose to as low as reasonably achievable. COMPARISON: Chest x-ray 04/23/2025 HISTORY: ORDERING SYSTEM PROVIDED HISTORY: dyspnea elevated dimer TECHNOLOGIST PROVIDED HISTORY: Reason for exam:->dyspnea elevated dimer Additional Contrast?->1 Reason for Exam: dyspnea elevated dimer FINDINGS: There is motion artifact throughout the exam. Pulmonary Arteries: Pulmonary arteries are adequately opacified for evaluation.  No evidence of intraluminal filling defect to suggest pulmonary embolism.  Main pulmonary artery is normal in caliber. Mediastinum: There are small lymph nodes scattered along the AP window and pretracheal region with the largest measuring 2.4 cm.  There is mild infrahilar adenopathy bilaterally with the largest on the left measuring 2.3 cm.  The heart and pericardium demonstrate no acute abnormality.  There is no acute abnormality of the thoracic aorta. Lungs/pleura: There is hazy airspace opacity along the right mid lung extending inferiorly with subsegmental airspace opacities scattered along the right lung base anteriorly and posteriorly.  There is mild loculated pleural fluid along the left upper chest laterally and with moderate airspace consolidation along the superior segment left lower lobe extending inferiorly into the perihilar region with moderate air bronchograms throughout.  There is 
administration of intravenous contrast.  Multiplanar reformatted images are provided for review.  MIP images are provided for review. Automated exposure control, iterative reconstruction, and/or weight based adjustment of the mA/kV was utilized to reduce the radiation dose to as low as reasonably achievable. COMPARISON: Chest x-ray 04/23/2025 HISTORY: ORDERING SYSTEM PROVIDED HISTORY: dyspnea elevated dimer TECHNOLOGIST PROVIDED HISTORY: Reason for exam:->dyspnea elevated dimer Additional Contrast?->1 Reason for Exam: dyspnea elevated dimer FINDINGS: There is motion artifact throughout the exam. Pulmonary Arteries: Pulmonary arteries are adequately opacified for evaluation.  No evidence of intraluminal filling defect to suggest pulmonary embolism.  Main pulmonary artery is normal in caliber. Mediastinum: There are small lymph nodes scattered along the AP window and pretracheal region with the largest measuring 2.4 cm.  There is mild infrahilar adenopathy bilaterally with the largest on the left measuring 2.3 cm.  The heart and pericardium demonstrate no acute abnormality.  There is no acute abnormality of the thoracic aorta. Lungs/pleura: There is hazy airspace opacity along the right mid lung extending inferiorly with subsegmental airspace opacities scattered along the right lung base anteriorly and posteriorly.  There is mild loculated pleural fluid along the left upper chest laterally and with moderate airspace consolidation along the superior segment left lower lobe extending inferiorly into the perihilar region with moderate air bronchograms throughout.  There is no endobronchial lesion.  There is no obvious pulmonary nodule or mass . There is a small left pleural effusion layering posteriorly and appears loculated superiorly. Upper Abdomen: The liver is mildly enlarged with hypertrophy of the caudate and left lobes fatty replacement throughout.  There is a 3.5 cm rounded mass in the left adrenal gland.  The 
etiology.  Recommend follow-up or PET scan correlation.      Extensive airspace consolidation left lower lobe which with smaller   ground-glass and airspace opacities scattered along the right upper lobe and   right lung base which may represent multisegmental bronchopneumonia.   Recommend follow-up until resolution.      Small left pleural effusion with mild loculated pleural fluid along the left   upper chest posterolaterally.      Chronic liver changes with fatty replacement throughout.      3.5 cm adrenal mass on the left which may represent an adenoma but is   indeterminate.  Recommend nonemergent follow-up limited CT scan of the   adrenal glands for further characterization.         XR CHEST PORTABLE   Final Result   Findings suggest pulmonary edema.  Underlying pneumonia cannot be excluded         CT ABDOMEN PELVIS WO CONTRAST Additional Contrast? None    (Results Pending)           Kimo Torres MD      Please excuse brevity and/or typos. This report was transcribed using voice recognition software. Every effort was made to ensure accuracy, however, inadvertent computerized transcription errors may be present.        
software limitations. If there are questions or concerns about the content of this note of information contained within the body of this dictation they should be addressed with the provider for clarification.     
Tissues/Bones: No acute bone or soft tissue abnormality.     Motion artifact limiting the exam.  Within the limitations of the exam, no obvious acute pulmonary embolus can be seen. Unremarkable thoracic aorta. Mild mediastinal and hilar adenopathy which could be inflammatory or neoplastic in etiology.  Recommend follow-up or PET scan correlation. Extensive airspace consolidation left lower lobe which with smaller ground-glass and airspace opacities scattered along the right upper lobe and right lung base which may represent multisegmental bronchopneumonia. Recommend follow-up until resolution. Small left pleural effusion with mild loculated pleural fluid along the left upper chest posterolaterally. Chronic liver changes with fatty replacement throughout. 3.5 cm adrenal mass on the left which may represent an adenoma but is indeterminate.  Recommend nonemergent follow-up limited CT scan of the adrenal glands for further characterization.     XR CHEST PORTABLE  Result Date: 4/23/2025  EXAMINATION: ONE XRAY VIEW OF THE CHEST 4/23/2025 5:38 pm COMPARISON: None. HISTORY: ORDERING SYSTEM PROVIDED HISTORY: dyspnea TECHNOLOGIST PROVIDED HISTORY: Reason for exam:->dyspnea FINDINGS: Cardiac size appears normal.  There are bilateral infiltrates and small bilateral pleural effusions primarily in the mid and lower lung zones.. Underlying pulmonary vascular congestion.  No evidence of pneumothorax.  No acute osseous abnormalities     Findings suggest pulmonary edema.  Underlying pneumonia cannot be excluded       Cultures:  Organism: No results found for: \"ORG\"    Blood Cultures: Reviewed. No growth so far.  Respiratory panel: Pending  Pneumonia panel: Pending  Strep pneumococcal antigen: Negative    DISCLAIMER: Please note that some or all of this record was generated using voice recognition software. Efforts were made by me to ensure accuracy, however some errors may be present due to limitations of this technology and 
device/equipment, transfer training, discharge recommendations  Learning Assessment:  patient verbalizes and demonstrates understanding    Assessment  Activity Tolerance: Limited due to SpO2 desat, SOB, fatigue, weakness  Impairments Requiring Therapeutic Intervention: decreased functional mobility, decreased strength, decreased endurance, decreased balance  Prognosis: good  Clinical Assessment: At baseline, Jaden is modified independent with the use of a 4WW in the home and a power w/c in the community. Today, Jaden presents at a level below baseline due to the above impairments and activity tolerance limitations. She requires skilled PT in order to build endurance and strength to return to her baseline level of function.  Safety Interventions: patient left in chair, chair alarm in place, call light within reach, gait belt, patient at risk for falls, and nurse notified    Plan  Frequency: 3-5 x/per week  Current Treatment Recommendations: strengthening, balance training, functional mobility training, transfer training, gait training, endurance training, neuromuscular re-education, patient/caregiver education, home management training, home exercise program, safety education, and equipment evaluation/education    Goals  Patient Goals: did not state   Short Term Goals:  Time Frame: discharge  Patient will complete bed mobility at stand by assistance   Patient will complete transfers at stand by assistance   Patient will ambulate 20 ft with use of rolling walker at contact guard assistance    Above goals reviewed on 4/29/2025.  All goals are ongoing at this time unless indicated above.      Therapy Session Time      Individual Group Co-treatment   Time In     0859   Time Out     1010   Minutes     71     Timed Code Treatment Minutes:  56 Minutes  Total Treatment Minutes: 71 Minutes       Electronically Signed By: Chicho aTylor, SPT   Therapist observed and directed the above evaluation. Thanks, Shira Alicia, PT, DPT

## 2025-05-08 NOTE — CARE COORDINATION
Case Management Assessment  Initial Evaluation    Date/Time of Evaluation: 5/8/2025 3:30 PM  Assessment Completed by: LIMA Potter    If patient is discharged prior to next notation, then this note serves as note for discharge by case management.    Patient Name: Jaden Denise                   YOB: 1965  Diagnosis: Acute respiratory failure with hypoxia (HCC) [J96.01]                   Date / Time: 5/7/2025  6:03 PM    Patient Admission Status: REHAB IP   Readmission Risk (Low < 19, Mod (19-27), High > 27): Readmission Risk Score: 16.9    Current PCP: No primary care provider on file.  PCP verified by CM? Yes    Chart Reviewed: Yes      History Provided by: Patient  Patient Orientation: Alert and Oriented    Patient Cognition: Alert    Hospitalization in the last 30 days (Readmission):  No    If yes, Readmission Assessment in CM Navigator will be completed.    Advance Directives:      Code Status: Full Code   Patient's Primary Decision Maker is: Legal Next of Kin      Discharge Planning:    Patient lives with: (P) Children Type of Home: (P) House  Primary Care Giver: Private caregiver  Patient Support Systems include: Children   Current Financial resources: (P) [place], [place]  Current community resources: (P) Other (Comment) ([place] [facility])  Current services prior to admission: (P) Durable Medical Equipment, Meals On Wheels, Home Care            Current DME: (P) Wheelchair, Walker, Cane (Bariatric stand up walker)            Type of Home Care services:  (P) OT, PT, Nursing Services    ADLS  Prior functional level: (P) Assistance with the following:, Mobility, Dressing, Bathing  Current functional level: (P) Mobility, Assistance with the following:, Dressing, Bathing    PT AM-PAC:   /24  OT AM-PAC:   /24    Family can provide assistance at DC: (P) Yes  Would you like Case Management to discuss the discharge plan with any other family members/significant others, and if so, who? (P)  Yes (Son Chapito)  Plans to Return to Present Housing: (P) Yes  Other Identified Issues/Barriers to RETURNING to current housing: NA  Potential Assistance needed at discharge: (P) Durable Medical Equipment            Potential DME: (P) Oxygen Therapy (Comment), Bipap, Walker  Patient expects to discharge to: (P) House  Plan for transportation at discharge:      Financial    Payor: [employer] / Plan: [employer] / Product Type: *No Product type* /     Does insurance require precert for SNF: Yes    Potential assistance Purchasing Medications: (P) No  Meds-to-Beds request: Yes      [facility] - [place], [place] - [address] - P [phone] - F [phone]  [address]  [address]  [facility] [address]  Phone: [phone] Fax: [phone]      Notes:    Factors facilitating achievement of predicted outcomes: Family support and Has needed Durable Medical Equipment at home    Barriers to discharge: Pain    Additional Case Management Notes: Patient is 60 year old female from home with adult son. Patient was admitted to ARU for Acute respiratory failure with hypoxia (HCC). Discharge date is TBD. Patient is A&O x 4. She has active insurance with a PCP in the community. Patient ambulates with a rolling walker requiring stand by assistance, minimal assistance. Disposition is home with healthcare. She has home care through [facility] provided by [place] [facility]. DME is TBD, but already owns a cane, electric wheelchair and bariatric stand up walker. SW will continue to follow therapy and Dr. Sen recommendations and discuss closer to discharge.    Patient previously had [facility] but does not want to resume services with them. HHC list left at bedside.     The Plan for Transition of Care is related to the following treatment goals of Acute respiratory failure with hypoxia (HCC) [J96.01]    IF APPLICABLE: The Patient and/or patient representative Jaden and her family were provided with a choice of

## 2025-05-08 NOTE — PROGRESS NOTES
Admission Period/Goal QM Codes for Jaden Denise.    QM Admit Code Goal Code   Eating     Oral Hygiene     Toileting Hygiene     Shower/Bathing     UB Dressing     LB Dressing     Putting on/off Footwear     Rolling Left and Right     Sit To Lying     Lying to Sitting on Bedside     Sit to Stand     Chair/Bed to Chair Transfer     Toilet Transfers     Car Transfers     Walk 10 Feet     Walk 50 Feet with Two Turns     Walk 150 Feet     Walk 10 Feet on Uneven Surfaces     1 Step (Curb)     4 Steps     12 Steps     Picking up Object from Floor     Wheel 50 Feet with 2 Turns     Type     Wheel 150 Feet     Type         The above codes were determined by the treatment team to be the patient's accurate admission assessment codes based on assessment performed soon after the patient's admission and prior to the benefit of services provided by staff, or if appropriate, the patient's usual performance during the admission assessment period.    OT:       PT:       RN:       ST:       :

## 2025-05-08 NOTE — PROGRESS NOTES
05/08/25 0045   NIV Type   $NIV $Daily Charge   Ventilator ID [id number]   NIV Started/Stopped On   Equipment Type V60   Mode Bilevel   Mask Type Full face mask   Mask Size Medium   Assessment   Pulse 88   Respirations 23   SpO2 93 %   Breath Sounds   Respiratory Pattern Regular   Breath Sounds Bilateral Diminished   Settings/Measurements   PIP Observed 11 cm H20   IPAP 10 cmH20   CPAP/EPAP 5 cmH2O   Vt (Measured) 617 mL   Rate Ordered 10   FiO2  30 %   I Time/ I Time % 0.9 s   Minute Volume (L/min) 14.6 Liters   Mask Leak (lpm) 2 lpm   Patient's Home Machine No   Alarm Settings   Alarms On Y   Low Pressure (cmH2O) 3 cmH2O   High Pressure (cmH2O) 30 cmH2O   RR Low (bpm) 11   RR High (bpm) 40 br/min

## 2025-05-08 NOTE — PROGRESS NOTES
Nutrition Note    RECOMMENDATIONS  PO Diet: [facility] [place]  ONS: N/A     ASSESSMENT   Nutrition intervention triggered for ARU admission s/p respiratory failure with hypoxia.  Pt reports has a good appetite & has had no unintentional wt loss.  Po intake usually greater than 75% of meals.  Pt has quit smoking & would like snacks that won't raise blood sugars.  Offered suggestions & reviewed menu options.  No further nutrition concerns at this time.       Malnutrition Status: No malnutrition     NUTRITION DIAGNOSIS   Altered nutrition-related lab values related to endocrine dysfunction as evidenced by lab values (elevated glucose)    Goals: PO intake 75% or greater     NUTRITION RELATED FINDINGS  Objective: nonpitting BLE edema  Wounds: None    CURRENT NUTRITION THERAPIES  ADULT DIET; Regular; 4 carb choices (60 gm/meal)       PO Intake: %   PO Supplement Intake:None Ordered    ANTHROPOMETRICS  Current Height: 170.2 cm (5' 7\")  Current Weight - Scale: (!) 142.1 kg (313 lb 3.2 oz)      EDUCATION  Education/Counseling initiated (discussed carb servings permitted with meals & reviewed menu options)     The patient will be monitored per nutrition standards of care. Consult dietitian if additional nutrition interventions are needed prior to RD reassessment.     Zakia Morrissey, JANEL, LD    Contact: [phone]

## 2025-05-08 NOTE — PROGRESS NOTES
4 Eyes Skin Assessment     NAME:  Jaden Denise  YOB: 1965  MEDICAL RECORD NUMBER:  [id number]    The patient is being assessed for  Admission    I agree that at least one RN has performed a thorough Head to Toe Skin Assessment on the patient. ALL assessment sites listed below have been assessed.      Areas assessed by both nurses:    Head, Face, Ears, Shoulders, Back, Chest, Arms, Elbows, Hands, Sacrum. Buttock, Coccyx, Ischium, Legs. Feet and Heels, and Under Medical Devices         Does the Patient have a Wound? Yes wound(s) were present on assessment. LDA wound assessment was Initiated and completed by RN       Riky Prevention initiated by RN: Yes  Wound Care Orders initiated by RN: No    Pressure Injury (Stage 3,4, Unstageable, DTI, NWPT, and Complex wounds) if present, place Wound referral order by RN under : No    New Ostomies, if present place, Ostomy referral order under : No     Nurse 1 eSignature: Electronically signed by Janna Bolaños RN on 5/8/25 at 8:49 AM EDT    **SHARE this note so that the co-signing nurse can place an eSignature**    Nurse 2 eSignature: Electronically signed by Nadya Casarez RN on 5/8/25 at 9:20 AM EDT

## 2025-05-08 NOTE — PROGRESS NOTES
[facility] - Inpatient Rehabilitation Department   Phone: [phone]    Occupational Therapy    [x] Initial Evaluation            [] Daily Treatment Note         [] Discharge Summary      Patient: Jaden Denise   : 1965   MRN: [id number]   Date of Service:  2025    Admitting Diagnosis:  Acute respiratory failure with hypoxia (HCC)  Current Admission Summary:   Jaden Denise is a 60 y.o. female with PMHx notable for T2DM, COPD, hypothyroidism, tobacco use disorder, morbid obesity who presented on 2025 with dyspnea and cough.  She was hypoxic on room air, CT chest showed patchy bilateral infiltrates, left greater than right.  She required NIV with BiPAP.  She was started on broad-spectrum IV antibiotics, IV steroids and breathing treatments for COPD exacerbation and bilateral multifocal pneumonia.  Pulmonology consulted, following.  She completed course of IV ceftriaxone and azithromycin.  She initially required high flow nasal cannula, but has been able to wean down to low-flow.  She is requiring BiPAP overnight, with concern for underlying ARISTEO.  She was also diuresed with IV Lasix, now transitioned to oral.   Past Medical History:  has a past medical history of COPD (chronic obstructive pulmonary disease) (HCC), Diabetes mellitus (HCC), and Thyroid disease.  Past Surgical History:  has a past surgical history that includes Spinal fusion; Ovary removal (Left); and Endometrial ablation.    Discharge Recommendations: Home with HHOT     DME Required For Discharge: DME to be determined pending patient progress- reacher, sock aide, tub transfer bench, toilet aide, long handled sponge, grab bars    Precautions/Restrictions: high fall risk  Weight Bearing Restrictions: no restrictions    Required Braces/Orthotics: no braces required  Positional Restrictions:no positional restrictions    Pre-Admission Information   Lives With: son (son has dialysis 3 days a week, works full time either at  within the room and rollator in community. Pt requiring min-maxA for ADLs and SBA-CGA for mobility. Pt requiring 3-4 L of O2 for sessions this date. Pt primarily limited by decreased activity tolerance, body habitus and decreased dynamic balance. Pt will benefit from continued OT services to address above deficits and maximize safety and independence.   Safety Interventions: patient left in chair, chair alarm in place, call light within reach, and patient at risk for falls    Plan  Frequency: 5 x/week, 90 min/day  Current Treatment Recommendations: strengthening, balance training, functional mobility training, transfer training, endurance training, patient/caregiver education, ADL/self-care training, IADL training, and equipment evaluation/education    Goals  Patient Goals: to go home and be more independent    Short Term Goals:  Time Frame: 7 days   Patient will complete upper body ADL at modified independent   Patient will complete lower body ADL at modified independent   Patient will complete toileting at modified independent   Patient will complete functional transfers at modified independent   Patient to gather and transport items at modified independent     Above goals reviewed on 5/8/2025.  All goals are ongoing at this time unless indicated above.       Therapy Session Time     Individual Group Co-treatment   Time In 0835      Time Out 0940      Minutes 65         Second Session Therapy Time:   Individual Concurrent Group Co-treatment   Time In 1305         Time Out 1343         Minutes 38           Timed Code Treatment Minutes:  Timed Code Treatment Minutes: 50 Minutes + 38 minutes   Total Treatment Minutes:  103 minutes        Electronically Signed By: VICKY Huffman MOT OTR/L [id number]

## 2025-05-09 LAB
ANION GAP SERPL CALCULATED.3IONS-SCNC: 8 MMOL/L (ref 3–16)
BASOPHILS # BLD: 0.1 K/UL (ref 0–0.2)
BASOPHILS NFR BLD: 0.7 %
BUN SERPL-MCNC: 19 MG/DL (ref 7–20)
CALCIUM SERPL-MCNC: 9.4 MG/DL (ref 8.3–10.6)
CHLORIDE SERPL-SCNC: 99 MMOL/L (ref 99–110)
CO2 SERPL-SCNC: 28 MMOL/L (ref 21–32)
CREAT SERPL-MCNC: 0.7 MG/DL (ref 0.6–1.1)
DEPRECATED RDW RBC AUTO: 18.6 % (ref 12.4–15.4)
EOSINOPHIL # BLD: 0.2 K/UL (ref 0–0.6)
EOSINOPHIL NFR BLD: 2 %
GFR SERPLBLD CREATININE-BSD FMLA CKD-EPI: >90 ML/MIN/{1.73_M2}
GLUCOSE BLD-MCNC: 208 MG/DL (ref 70–99)
GLUCOSE BLD-MCNC: 255 MG/DL (ref 70–99)
GLUCOSE BLD-MCNC: 297 MG/DL (ref 70–99)
GLUCOSE BLD-MCNC: 380 MG/DL (ref 70–99)
GLUCOSE SERPL-MCNC: 238 MG/DL (ref 70–99)
HCT VFR BLD AUTO: 46.4 % (ref 36–48)
HGB BLD-MCNC: 15 G/DL (ref 12–16)
LYMPHOCYTES # BLD: 2.3 K/UL (ref 1–5.1)
LYMPHOCYTES NFR BLD: 23.9 %
MCH RBC QN AUTO: 26.7 PG (ref 26–34)
MCHC RBC AUTO-ENTMCNC: 32.3 G/DL (ref 31–36)
MCV RBC AUTO: 82.8 FL (ref 80–100)
MONOCYTES # BLD: 0.9 K/UL (ref 0–1.3)
MONOCYTES NFR BLD: 9.4 %
NEUTROPHILS # BLD: 6.2 K/UL (ref 1.7–7.7)
NEUTROPHILS NFR BLD: 64 %
PERFORMED ON: ABNORMAL
PLATELET # BLD AUTO: 249 K/UL (ref 135–450)
PMV BLD AUTO: 8.8 FL (ref 5–10.5)
POTASSIUM SERPL-SCNC: 4.1 MMOL/L (ref 3.5–5.1)
RBC # BLD AUTO: 5.6 M/UL (ref 4–5.2)
SODIUM SERPL-SCNC: 135 MMOL/L (ref 136–145)
WBC # BLD AUTO: 9.7 K/UL (ref 4–11)

## 2025-05-09 PROCEDURE — 1280000000 HC REHAB R&B

## 2025-05-09 PROCEDURE — 97110 THERAPEUTIC EXERCISES: CPT

## 2025-05-09 PROCEDURE — 6360000002 HC RX W HCPCS: Performed by: STUDENT IN AN ORGANIZED HEALTH CARE EDUCATION/TRAINING PROGRAM

## 2025-05-09 PROCEDURE — 36415 COLL VENOUS BLD VENIPUNCTURE: CPT

## 2025-05-09 PROCEDURE — 80048 BASIC METABOLIC PNL TOTAL CA: CPT

## 2025-05-09 PROCEDURE — 97535 SELF CARE MNGMENT TRAINING: CPT

## 2025-05-09 PROCEDURE — 97530 THERAPEUTIC ACTIVITIES: CPT

## 2025-05-09 PROCEDURE — 6370000000 HC RX 637 (ALT 250 FOR IP): Performed by: STUDENT IN AN ORGANIZED HEALTH CARE EDUCATION/TRAINING PROGRAM

## 2025-05-09 PROCEDURE — 94660 CPAP INITIATION&MGMT: CPT

## 2025-05-09 PROCEDURE — 85025 COMPLETE CBC W/AUTO DIFF WBC: CPT

## 2025-05-09 PROCEDURE — 94640 AIRWAY INHALATION TREATMENT: CPT

## 2025-05-09 RX ORDER — INSULIN GLARGINE 100 [IU]/ML
22 INJECTION, SOLUTION SUBCUTANEOUS 2 TIMES DAILY
Status: DISCONTINUED | OUTPATIENT
Start: 2025-05-09 | End: 2025-05-10

## 2025-05-09 RX ADMIN — INSULIN LISPRO 8 UNITS: 100 INJECTION, SOLUTION INTRAVENOUS; SUBCUTANEOUS at 12:44

## 2025-05-09 RX ADMIN — GUAIFENESIN AND DEXTROMETHORPHAN 5 ML: 100; 10 SYRUP ORAL at 05:42

## 2025-05-09 RX ADMIN — INSULIN LISPRO 10 UNITS: 100 INJECTION, SOLUTION INTRAVENOUS; SUBCUTANEOUS at 12:44

## 2025-05-09 RX ADMIN — GUAIFENESIN AND DEXTROMETHORPHAN 5 ML: 100; 10 SYRUP ORAL at 23:19

## 2025-05-09 RX ADMIN — ENOXAPARIN SODIUM 30 MG: 100 INJECTION SUBCUTANEOUS at 08:58

## 2025-05-09 RX ADMIN — FAMOTIDINE 20 MG: 20 TABLET, FILM COATED ORAL at 20:58

## 2025-05-09 RX ADMIN — GUAIFENESIN AND DEXTROMETHORPHAN 5 ML: 100; 10 SYRUP ORAL at 12:44

## 2025-05-09 RX ADMIN — INSULIN GLARGINE 18 UNITS: 100 INJECTION, SOLUTION SUBCUTANEOUS at 08:57

## 2025-05-09 RX ADMIN — INSULIN LISPRO 8 UNITS: 100 INJECTION, SOLUTION INTRAVENOUS; SUBCUTANEOUS at 20:57

## 2025-05-09 RX ADMIN — FAMOTIDINE 20 MG: 20 TABLET, FILM COATED ORAL at 08:57

## 2025-05-09 RX ADMIN — ENOXAPARIN SODIUM 30 MG: 100 INJECTION SUBCUTANEOUS at 20:57

## 2025-05-09 RX ADMIN — BUDESONIDE 500 MCG: 0.5 INHALANT RESPIRATORY (INHALATION) at 05:37

## 2025-05-09 RX ADMIN — INSULIN LISPRO 10 UNITS: 100 INJECTION, SOLUTION INTRAVENOUS; SUBCUTANEOUS at 08:57

## 2025-05-09 RX ADMIN — LEVOTHYROXINE SODIUM 112 MCG: 0.11 TABLET ORAL at 05:42

## 2025-05-09 RX ADMIN — ARFORMOTEROL TARTRATE 15 MCG: 15 SOLUTION RESPIRATORY (INHALATION) at 05:37

## 2025-05-09 RX ADMIN — BUDESONIDE 500 MCG: 0.5 INHALANT RESPIRATORY (INHALATION) at 21:08

## 2025-05-09 RX ADMIN — SIMETHICONE 80 MG: 80 TABLET, CHEWABLE ORAL at 10:40

## 2025-05-09 RX ADMIN — INSULIN GLARGINE 22 UNITS: 100 INJECTION, SOLUTION SUBCUTANEOUS at 20:57

## 2025-05-09 RX ADMIN — FUROSEMIDE 20 MG: 20 TABLET ORAL at 08:56

## 2025-05-09 RX ADMIN — DIPHENHYDRAMINE HYDROCHLORIDE 50 MG: 25 TABLET ORAL at 20:58

## 2025-05-09 RX ADMIN — PREDNISONE 30 MG: 20 TABLET ORAL at 08:57

## 2025-05-09 RX ADMIN — DIPHENHYDRAMINE HYDROCHLORIDE 50 MG: 25 TABLET ORAL at 03:43

## 2025-05-09 RX ADMIN — INSULIN LISPRO 10 UNITS: 100 INJECTION, SOLUTION INTRAVENOUS; SUBCUTANEOUS at 17:35

## 2025-05-09 RX ADMIN — INSULIN LISPRO 16 UNITS: 100 INJECTION, SOLUTION INTRAVENOUS; SUBCUTANEOUS at 17:35

## 2025-05-09 RX ADMIN — GUAIFENESIN AND DEXTROMETHORPHAN 5 ML: 100; 10 SYRUP ORAL at 17:35

## 2025-05-09 RX ADMIN — ONDANSETRON 4 MG: 4 TABLET, ORALLY DISINTEGRATING ORAL at 20:59

## 2025-05-09 RX ADMIN — ARFORMOTEROL TARTRATE 15 MCG: 15 SOLUTION RESPIRATORY (INHALATION) at 21:08

## 2025-05-09 RX ADMIN — INSULIN LISPRO 14 UNITS: 100 INJECTION, SOLUTION INTRAVENOUS; SUBCUTANEOUS at 08:58

## 2025-05-09 ASSESSMENT — PAIN SCALES - GENERAL: PAINLEVEL_OUTOF10: 0

## 2025-05-09 NOTE — PROGRESS NOTES
Pt evening shift assessment complete. VSS and medication given as ordered. Pt assessed for comfort needs.  Pt does not express any additional needs at this time, resting comfortably in bed.

## 2025-05-09 NOTE — PROGRESS NOTES
[facility] - Inpatient Rehabilitation Department   Phone: [phone]    Physical Therapy    [] Initial Evaluation            [x] Daily Treatment Note         [] Discharge Summary      Patient: Jaden Denise   : 1965   MRN: [id number]   Date of Service:  2025  Admitting Diagnosis: Acute respiratory failure with hypoxia (HCC)  Current Admission Summary: 60 y.o. female with PMHx notable for T2DM, COPD, hypothyroidism, tobacco use disorder, morbid obesity who presented on 2025 with dyspnea and cough.  She was hypoxic on room air, CT chest showed patchy bilateral infiltrates, left greater than right.  She required NIV with BiPAP.  She was started on broad-spectrum IV antibiotics, IV steroids and breathing treatments for COPD exacerbation and bilateral multifocal pneumonia.  Pulmonology consulted, following.  She completed course of IV ceftriaxone and azithromycin.  She initially required high flow nasal cannula, but has been able to wean down to low-flow.  She is requiring BiPAP overnight, with concern for underlying ARISTEO.  She was also diuresed with IV Lasix, now transitioned to oral.   Past Medical History:  has a past medical history of Anxiety, Bladder incontinence, Contact dermatitis, COPD (chronic obstructive pulmonary disease) (HCC), Depression, Diabetes mellitus (HCC), Lymphedema, Neuropathy, Rosacea, and Thyroid disease.  Past Surgical History:  has a past surgical history that includes Spinal fusion; Ovary removal (Left); Endometrial ablation; Tubal ligation (Bilateral); and Tonsillectomy.  Discharge Recommendations: Home w/ HHPT   DME Required For Discharge: DME to be determined pending patient progress  Precautions/Restrictions: high fall risk  Weight Bearing Restrictions: no restrictions  [] Right Upper Extremity  [] Left Upper Extremity [] Right Lower Extremity  [] Left Lower Extremity     Required Braces/Orthotics: no braces required   [] Right  [] Left  Positional

## 2025-05-09 NOTE — PLAN OF CARE
Problem: Chronic Conditions and Co-morbidities  Goal: Patient's chronic conditions and co-morbidity symptoms are monitored and maintained or improved  5/8/2025 2135 by Shira Casey, RN  Outcome: Progressing  Flowsheets (Taken 5/8/2025 1957)  Care Plan - Patient's Chronic Conditions and Co-Morbidity Symptoms are Monitored and Maintained or Improved: Monitor and assess patient's chronic conditions and comorbid symptoms for stability, deterioration, or improvement  5/8/2025 0852 by Janna Bolaños, RN  Outcome: Progressing  Flowsheets (Taken 5/7/2025 2001)  Care Plan - Patient's Chronic Conditions and Co-Morbidity Symptoms are Monitored and Maintained or Improved:   Monitor and assess patient's chronic conditions and comorbid symptoms for stability, deterioration, or improvement   Collaborate with multidisciplinary team to address chronic and comorbid conditions and prevent exacerbation or deterioration   Update acute care plan with appropriate goals if chronic or comorbid symptoms are exacerbated and prevent overall improvement and discharge

## 2025-05-09 NOTE — PROGRESS NOTES
Jaden Denise  5/9/2025  [id number]    Chief Complaint: Acute respiratory failure with hypoxia (HCC)    Subjective    No acute events overnight.     Patient reports she is doing well. Tolerated her BiPAP a bit longer last night. Denies any dyspnea at rest. Rash and associated itching slowly improving. Denies any other new concerns. She wants to thank Dustin Russell RN for talking with her insurance and getting her into inpatient rehab. Last Bowel Movement:  05/09/25          Objective    Patient Vitals for the past 24 hrs:   BP Temp Temp src Pulse Resp SpO2 Weight   05/09/25 0900 124/80 98.2 °F (36.8 °C) Oral 98 18 92 % --   05/09/25 0600 -- -- -- -- -- -- (!) 144.7 kg (319 lb)   05/09/25 0537 -- -- -- 96 18 95 % --   05/09/25 0342 -- -- -- 76 20 93 % --   05/09/25 0013 -- -- -- 92 18 90 % --   05/08/25 2050 -- -- -- 92 18 92 % --   05/08/25 2001 (!) 163/81 98.3 °F (36.8 °C) Oral 91 20 91 % --     Patient Vitals for the past 96 hrs (Last 3 readings):   Weight   05/09/25 0600 (!) 144.7 kg (319 lb)   05/07/25 1835 (!) 142.1 kg (313 lb 3.2 oz)      Gen: No distress.  HEENT: Normocephalic, atraumatic.  CV: Regular rate and rhythm. Extremities warm, well perfused.   Resp: No respiratory distress on 3L O2 via NC. CTAB.  Abd: Soft, nontender.  Ext: Trace BLE edema.  Neuro: Alert, oriented, appropriately interactive.     Laboratory data:   Na/K/Cl/CO2:  135/4.1/99/28 (05/09 0616)   BUN/Cr/glu/ALT/AST/amyl/lip:  19/0.7/--/--/--/--/-- (05/09 0616)    WBC/Hgb/Hct/Plts:  9.7/15.0/46.4/249 (05/09 0616)     Therapy progress:       PT    Supine to Sit: Supervision or touching assistance  Sit to Supine: Supervision or touching assistance   Sit to Stand: Supervision or touching assistance  Chair/Bed to Chair Transfer: Supervision or touching assistance  Car Transfer: Supervision or touching assistance  Ambulation 10 ft: Supervision or touching assistance  Ambulation 50 ft: Supervision or touching assistance  Ambulation 150 ft:   TBD  Services: TBD  DME: TBD  Follow-ups: Pulmonology, Sleep Medicine, PCP        Abdon Sen MD 5/9/2025, 6:07 PM    * This document was created using dictation software.  While all precautions were taken to ensure accuracy, errors may have occurred.  Please disregard any typographical errors.

## 2025-05-09 NOTE — PROGRESS NOTES
[facility] - Inpatient Rehabilitation Department   Phone: [phone]    Occupational Therapy    [] Initial Evaluation            [x] Daily Treatment Note         [] Discharge Summary      Patient: Jaden Denise   : 1965   MRN: [id number]   Date of Service:  2025    Admitting Diagnosis:  Acute respiratory failure with hypoxia (HCC)  Current Admission Summary:   Jaden Denise is a 60 y.o. female with PMHx notable for T2DM, COPD, hypothyroidism, tobacco use disorder, morbid obesity who presented on 2025 with dyspnea and cough.  She was hypoxic on room air, CT chest showed patchy bilateral infiltrates, left greater than right.  She required NIV with BiPAP.  She was started on broad-spectrum IV antibiotics, IV steroids and breathing treatments for COPD exacerbation and bilateral multifocal pneumonia.  Pulmonology consulted, following.  She completed course of IV ceftriaxone and azithromycin.  She initially required high flow nasal cannula, but has been able to wean down to low-flow.  She is requiring BiPAP overnight, with concern for underlying ARISTEO.  She was also diuresed with IV Lasix, now transitioned to oral.   Past Medical History:  has a past medical history of Anxiety, Bladder incontinence, Contact dermatitis, COPD (chronic obstructive pulmonary disease) (HCC), Depression, Diabetes mellitus (HCC), Lymphedema, Neuropathy, Rosacea, and Thyroid disease.  Past Surgical History:  has a past surgical history that includes Spinal fusion; Ovary removal (Left); Endometrial ablation; Tubal ligation (Bilateral); and Tonsillectomy.    Discharge Recommendations: Home with OT     DME Required For Discharge: bariatric rolling walker, reacher, sock aide, tub transfer bench, toilet aide, long handled sponge, grab bars    Precautions/Restrictions: high fall risk  Weight Bearing Restrictions: no restrictions    Required Braces/Orthotics: no braces required  Positional Restrictions:no positional    WFL     Education  Barriers To Learning: none  Patient Education: patient educated on goals, OT role and benefits, plan of care, ADL adaptive strategies, proper use of assistive device/equipment, transfer training, discharge recommendations  Learning Assessment:  patient verbalizes and demonstrates understanding    Assessment  Activity Tolerance: pt tolerated well with rest breaks and 3 L of O2, SpO2 dropped to 88% after mobility   Recovered to 90-92%. Reporting some dizziness following mobility- /70 mmHg   Impairments Requiring Therapeutic Intervention: decreased functional mobility, decreased ADL status, decreased strength, decreased endurance, decreased sensation, decreased balance, decreased IADL  Prognosis: good  Clinical Assessment: Pt presenting below baseline function secondary to acute respiratory failure. Pt typically independent with ADLs and mobility with SPC within house and rollator in community. Pt requiring SBA for mobility with RW. Pt continues to need 3 L of O2 for sessions this date. Pt primarily limited by decreased activity tolerance and decreased dynamic balance. Pt will benefit from continued OT services to address above deficits and maximize safety and independence.   Safety Interventions: patient left in chair, chair alarm in place, call light within reach, and patient at risk for falls    Plan  Frequency: 5 x/week, 90 min/day  Current Treatment Recommendations: strengthening, balance training, functional mobility training, transfer training, endurance training, patient/caregiver education, ADL/self-care training, IADL training, and equipment evaluation/education    Goals  Patient Goals: to go home and be more independent    Short Term Goals:  Time Frame: 7 days   Patient will complete upper body ADL at modified independent   Patient will complete lower body ADL at modified independent   Patient will complete toileting at modified independent   Patient will complete functional

## 2025-05-10 LAB
GLUCOSE BLD-MCNC: 150 MG/DL (ref 70–99)
GLUCOSE BLD-MCNC: 243 MG/DL (ref 70–99)
GLUCOSE BLD-MCNC: 351 MG/DL (ref 70–99)
GLUCOSE BLD-MCNC: 380 MG/DL (ref 70–99)
PERFORMED ON: ABNORMAL

## 2025-05-10 PROCEDURE — 97530 THERAPEUTIC ACTIVITIES: CPT

## 2025-05-10 PROCEDURE — 94640 AIRWAY INHALATION TREATMENT: CPT

## 2025-05-10 PROCEDURE — 1280000000 HC REHAB R&B

## 2025-05-10 PROCEDURE — 94761 N-INVAS EAR/PLS OXIMETRY MLT: CPT

## 2025-05-10 PROCEDURE — 97110 THERAPEUTIC EXERCISES: CPT

## 2025-05-10 PROCEDURE — 6360000002 HC RX W HCPCS: Performed by: STUDENT IN AN ORGANIZED HEALTH CARE EDUCATION/TRAINING PROGRAM

## 2025-05-10 PROCEDURE — 6370000000 HC RX 637 (ALT 250 FOR IP): Performed by: STUDENT IN AN ORGANIZED HEALTH CARE EDUCATION/TRAINING PROGRAM

## 2025-05-10 PROCEDURE — 94660 CPAP INITIATION&MGMT: CPT

## 2025-05-10 PROCEDURE — 2700000000 HC OXYGEN THERAPY PER DAY

## 2025-05-10 PROCEDURE — 97535 SELF CARE MNGMENT TRAINING: CPT

## 2025-05-10 PROCEDURE — 97116 GAIT TRAINING THERAPY: CPT

## 2025-05-10 RX ORDER — INSULIN GLARGINE 100 [IU]/ML
27 INJECTION, SOLUTION SUBCUTANEOUS 2 TIMES DAILY
Status: DISCONTINUED | OUTPATIENT
Start: 2025-05-10 | End: 2025-05-14

## 2025-05-10 RX ADMIN — ENOXAPARIN SODIUM 30 MG: 100 INJECTION SUBCUTANEOUS at 09:26

## 2025-05-10 RX ADMIN — PREDNISONE 30 MG: 20 TABLET ORAL at 09:17

## 2025-05-10 RX ADMIN — ENOXAPARIN SODIUM 30 MG: 100 INJECTION SUBCUTANEOUS at 20:21

## 2025-05-10 RX ADMIN — DIPHENHYDRAMINE HYDROCHLORIDE 50 MG: 25 TABLET ORAL at 20:21

## 2025-05-10 RX ADMIN — LEVOTHYROXINE SODIUM 112 MCG: 0.11 TABLET ORAL at 06:08

## 2025-05-10 RX ADMIN — ARFORMOTEROL TARTRATE 15 MCG: 15 SOLUTION RESPIRATORY (INHALATION) at 21:11

## 2025-05-10 RX ADMIN — FAMOTIDINE 20 MG: 20 TABLET, FILM COATED ORAL at 09:17

## 2025-05-10 RX ADMIN — BUDESONIDE 500 MCG: 0.5 INHALANT RESPIRATORY (INHALATION) at 05:36

## 2025-05-10 RX ADMIN — INSULIN GLARGINE 27 UNITS: 100 INJECTION, SOLUTION SUBCUTANEOUS at 20:19

## 2025-05-10 RX ADMIN — GUAIFENESIN AND DEXTROMETHORPHAN 5 ML: 100; 10 SYRUP ORAL at 06:08

## 2025-05-10 RX ADMIN — SIMETHICONE 80 MG: 80 TABLET, CHEWABLE ORAL at 09:36

## 2025-05-10 RX ADMIN — FAMOTIDINE 20 MG: 20 TABLET, FILM COATED ORAL at 20:21

## 2025-05-10 RX ADMIN — INSULIN LISPRO 10 UNITS: 100 INJECTION, SOLUTION INTRAVENOUS; SUBCUTANEOUS at 12:42

## 2025-05-10 RX ADMIN — INSULIN LISPRO 16 UNITS: 100 INJECTION, SOLUTION INTRAVENOUS; SUBCUTANEOUS at 17:50

## 2025-05-10 RX ADMIN — FUROSEMIDE 20 MG: 20 TABLET ORAL at 09:17

## 2025-05-10 RX ADMIN — INSULIN LISPRO 4 UNITS: 100 INJECTION, SOLUTION INTRAVENOUS; SUBCUTANEOUS at 09:18

## 2025-05-10 RX ADMIN — GUAIFENESIN AND DEXTROMETHORPHAN 5 ML: 100; 10 SYRUP ORAL at 12:42

## 2025-05-10 RX ADMIN — INSULIN LISPRO 16 UNITS: 100 INJECTION, SOLUTION INTRAVENOUS; SUBCUTANEOUS at 20:21

## 2025-05-10 RX ADMIN — INSULIN GLARGINE 22 UNITS: 100 INJECTION, SOLUTION SUBCUTANEOUS at 09:18

## 2025-05-10 RX ADMIN — INSULIN LISPRO 10 UNITS: 100 INJECTION, SOLUTION INTRAVENOUS; SUBCUTANEOUS at 09:17

## 2025-05-10 RX ADMIN — BUDESONIDE 500 MCG: 0.5 INHALANT RESPIRATORY (INHALATION) at 21:10

## 2025-05-10 RX ADMIN — INSULIN LISPRO 10 UNITS: 100 INJECTION, SOLUTION INTRAVENOUS; SUBCUTANEOUS at 17:50

## 2025-05-10 RX ADMIN — GUAIFENESIN AND DEXTROMETHORPHAN 5 ML: 100; 10 SYRUP ORAL at 17:49

## 2025-05-10 RX ADMIN — ARFORMOTEROL TARTRATE 15 MCG: 15 SOLUTION RESPIRATORY (INHALATION) at 05:36

## 2025-05-10 ASSESSMENT — PAIN SCALES - GENERAL: PAINLEVEL_OUTOF10: 0

## 2025-05-10 NOTE — FLOWSHEET NOTE
05/10/25 0915   Vital Signs   Temp 97.7 °F (36.5 °C)   Temp Source Oral   Pulse 90   Heart Rate Source Monitor   Respirations 18   /73   MAP (Calculated) 89   Pain Assessment   Pain Assessment None - Denies Pain   Oxygen Therapy   SpO2 92 %   O2 Device Nasal cannula   O2 Flow Rate (L/min) 3 L/min       Lab Results   Component Value Date/Time    WBC 9.7 05/09/2025 06:16 AM    HGB 15.0 05/09/2025 06:16 AM    HCT 46.4 05/09/2025 06:16 AM     05/09/2025 06:16 AM     (L) 05/09/2025 06:16 AM    K 4.1 05/09/2025 06:16 AM    BUN 19 05/09/2025 06:16 AM    CREATININE 0.7 05/09/2025 06:16 AM        AM Assessment completed.  Patient in chair.  Awake, Alert and oriented. Respirations easy unlabored.    Pain/Discomfort is being managed with PRN analgesics per MD orders (See MAR). Patient is able to express and rate pain using numerical scale.  Plan of care, education and safety measures reviewed and mutually agreed upon with the patient.   Calls appropriately. Needed items including call light in reach and exit alarm in place.

## 2025-05-10 NOTE — PROGRESS NOTES
[facility] - Inpatient Rehabilitation Department   Phone: [phone]    Physical Therapy    [] Initial Evaluation            [x] Daily Treatment Note         [] Discharge Summary      Patient: Jaden Denise   : 1965   MRN: [id number]   Date of Service:  5/10/2025  Admitting Diagnosis: Acute respiratory failure with hypoxia (HCC)  Current Admission Summary: 60 y.o. female with PMHx notable for T2DM, COPD, hypothyroidism, tobacco use disorder, morbid obesity who presented on 2025 with dyspnea and cough.  She was hypoxic on room air, CT chest showed patchy bilateral infiltrates, left greater than right.  She required NIV with BiPAP.  She was started on broad-spectrum IV antibiotics, IV steroids and breathing treatments for COPD exacerbation and bilateral multifocal pneumonia.  Pulmonology consulted, following.  She completed course of IV ceftriaxone and azithromycin.  She initially required high flow nasal cannula, but has been able to wean down to low-flow.  She is requiring BiPAP overnight, with concern for underlying ARISTEO.  She was also diuresed with IV Lasix, now transitioned to oral.   Past Medical History:  has a past medical history of Anxiety, Bladder incontinence, Contact dermatitis, COPD (chronic obstructive pulmonary disease) (HCC), Depression, Diabetes mellitus (HCC), Lymphedema, Neuropathy, Rosacea, and Thyroid disease.  Past Surgical History:  has a past surgical history that includes Spinal fusion; Ovary removal (Left); Endometrial ablation; Tubal ligation (Bilateral); and Tonsillectomy.  Discharge Recommendations: Home w/ HHPT   DME Required For Discharge: DME to be determined pending patient progress  Precautions/Restrictions: high fall risk  Weight Bearing Restrictions: no restrictions  [] Right Upper Extremity  [] Left Upper Extremity [] Right Lower Extremity  [] Left Lower Extremity     Required Braces/Orthotics: no braces required   [] Right  [] Left  Positional  and Vision Corrective Device: wears glasses for distance  Hearing:   WFL  Observation:   General Observation:  3 L/min O2  Posture:   Forward head, rounded shoulders        Subjective  General: Patient sitting in recliner chair upon arrival, agreeable to PT, stated she wore her Bipap for 5 hours last night.  Pt has chronic back pain with nerve damage to RLE.    Pain: 0/10  Pain Interventions: not applicable       Functional Mobility  Bed Mobility:  Bed mobility not completed on this date.  Comments:   Transfers:  Sit to stand transfer: stand by assistance  Stand to sit transfer: stand by assistance  Comments:   Ambulation:  Surface:level surface  Assistive Device: rolling walker  Other Appliance: supplemental O2  Assistance: stand by assistance  Distance: 200', 100', 120'    Gait Mechanics: WBOS, M/L sway, decreased step height/length, decreased freddy   Comments: O2 sat 92% on 3 L/min of O2 after activity,  bpm    Stair Mobility:  Stair mobility not completed on this date.  Comments:   Wheelchair Mobility:  No w/c mobility completed on this date.  Comments:  Balance:  Static Sitting Balance: good: independent with functional balance in unsupported position  Dynamic Sitting Balance: fair (+): maintains balance at SBA/supervision without use of UE support  Static Standing Balance: fair (-): maintains balance at SBA with use of UE support  Dynamic Standing Balance: fair (-): maintains balance at SBA with use of UE support  Comments:    Other Therapeutic Interventions    First Session: Seated stepper x5 minutes, step up/downs on 6 inch step with BHR 2x5 reps CGA, seated B knee LAQ with 2.5 lb weights 2 sets of 10 reps, seated B hip flexion with 2.5 weights x15 reps.      Second Session: Pt was found seated in recliner chair, no alarm on, agreeable to PT.  Sit to/from stand with supervision.  Pt ambulated in/out of bathroom with RW and supervision.  Pt toileted with min A.  Pt ambulated 60' x 2 reps without UE

## 2025-05-10 NOTE — PROGRESS NOTES
[facility] - Inpatient Rehabilitation Department   Phone: [phone]    Occupational Therapy    [] Initial Evaluation            [x] Daily Treatment Note         [] Discharge Summary      Patient: Jaden Denise   : 1965   MRN: [id number]   Date of Service:  5/10/2025    Admitting Diagnosis:  Acute respiratory failure with hypoxia (HCC)  Current Admission Summary:   Jaden Denise is a 60 y.o. female with PMHx notable for T2DM, COPD, hypothyroidism, tobacco use disorder, morbid obesity who presented on 2025 with dyspnea and cough.  She was hypoxic on room air, CT chest showed patchy bilateral infiltrates, left greater than right.  She required NIV with BiPAP.  She was started on broad-spectrum IV antibiotics, IV steroids and breathing treatments for COPD exacerbation and bilateral multifocal pneumonia.  Pulmonology consulted, following.  She completed course of IV ceftriaxone and azithromycin.  She initially required high flow nasal cannula, but has been able to wean down to low-flow.  She is requiring BiPAP overnight, with concern for underlying ARISTEO.  She was also diuresed with IV Lasix, now transitioned to oral.   Past Medical History:  has a past medical history of Anxiety, Bladder incontinence, Contact dermatitis, COPD (chronic obstructive pulmonary disease) (HCC), Depression, Diabetes mellitus (HCC), Lymphedema, Neuropathy, Rosacea, and Thyroid disease.  Past Surgical History:  has a past surgical history that includes Spinal fusion; Ovary removal (Left); Endometrial ablation; Tubal ligation (Bilateral); and Tonsillectomy.    Discharge Recommendations: Home with HHOT     DME Required For Discharge: DME to be determined pending patient progress- reacher, sock aide, tub transfer bench, toilet aide, long handled sponge, grab bars    Precautions/Restrictions: high fall risk  Weight Bearing Restrictions: no restrictions    Required Braces/Orthotics: no braces required  Positional

## 2025-05-10 NOTE — PLAN OF CARE
Problem: Chronic Conditions and Co-morbidities  Goal: Patient's chronic conditions and co-morbidity symptoms are monitored and maintained or improved  5/9/2025 2155 by Shira Casey, RN  Outcome: Progressing  5/9/2025 2149 by Shira Casey, RN  Outcome: Progressing  Flowsheets (Taken 5/9/2025 2057)  Care Plan - Patient's Chronic Conditions and Co-Morbidity Symptoms are Monitored and Maintained or Improved: Monitor and assess patient's chronic conditions and comorbid symptoms for stability, deterioration, or improvement

## 2025-05-10 NOTE — PROGRESS NOTES
Pt evening shift assessment complete. VSS and medication given as ordered. Pt assessed for comfort needs, assisted to the bathroom and back to bed and pt given zofran and benadryl per request.  Pt does not express any additional needs at this time, resting comfortably in bed.

## 2025-05-11 VITALS
BODY MASS INDEX: 45.99 KG/M2 | RESPIRATION RATE: 20 BRPM | OXYGEN SATURATION: 92 % | DIASTOLIC BLOOD PRESSURE: 57 MMHG | HEIGHT: 67 IN | WEIGHT: 293 LBS | SYSTOLIC BLOOD PRESSURE: 112 MMHG | TEMPERATURE: 97.8 F | HEART RATE: 92 BPM

## 2025-05-11 LAB
GLUCOSE BLD-MCNC: 173 MG/DL (ref 70–99)
GLUCOSE BLD-MCNC: 212 MG/DL (ref 70–99)
GLUCOSE BLD-MCNC: 228 MG/DL (ref 70–99)
GLUCOSE BLD-MCNC: 236 MG/DL (ref 70–99)
GLUCOSE BLD-MCNC: 289 MG/DL (ref 70–99)
PERFORMED ON: ABNORMAL

## 2025-05-11 PROCEDURE — 6360000002 HC RX W HCPCS: Performed by: STUDENT IN AN ORGANIZED HEALTH CARE EDUCATION/TRAINING PROGRAM

## 2025-05-11 PROCEDURE — 6370000000 HC RX 637 (ALT 250 FOR IP): Performed by: STUDENT IN AN ORGANIZED HEALTH CARE EDUCATION/TRAINING PROGRAM

## 2025-05-11 PROCEDURE — 94761 N-INVAS EAR/PLS OXIMETRY MLT: CPT

## 2025-05-11 PROCEDURE — 2700000000 HC OXYGEN THERAPY PER DAY

## 2025-05-11 PROCEDURE — 94640 AIRWAY INHALATION TREATMENT: CPT

## 2025-05-11 PROCEDURE — 1280000000 HC REHAB R&B

## 2025-05-11 PROCEDURE — 94660 CPAP INITIATION&MGMT: CPT

## 2025-05-11 RX ORDER — OXYBUTYNIN CHLORIDE 5 MG/1
15 TABLET, EXTENDED RELEASE ORAL DAILY
Status: DISCONTINUED | OUTPATIENT
Start: 2025-05-11 | End: 2025-05-16 | Stop reason: HOSPADM

## 2025-05-11 RX ORDER — IPRATROPIUM BROMIDE AND ALBUTEROL SULFATE 2.5; .5 MG/3ML; MG/3ML
1 SOLUTION RESPIRATORY (INHALATION)
Status: DISCONTINUED | OUTPATIENT
Start: 2025-05-11 | End: 2025-05-16 | Stop reason: HOSPADM

## 2025-05-11 RX ORDER — DIPHENHYDRAMINE HCL 25 MG
25 TABLET ORAL EVERY 6 HOURS PRN
Status: DISCONTINUED | OUTPATIENT
Start: 2025-05-11 | End: 2025-05-16 | Stop reason: HOSPADM

## 2025-05-11 RX ADMIN — DIPHENHYDRAMINE HYDROCHLORIDE 25 MG: 25 TABLET ORAL at 22:42

## 2025-05-11 RX ADMIN — OXYBUTYNIN CHLORIDE 15 MG: 5 TABLET, EXTENDED RELEASE ORAL at 09:45

## 2025-05-11 RX ADMIN — ENOXAPARIN SODIUM 30 MG: 100 INJECTION SUBCUTANEOUS at 08:30

## 2025-05-11 RX ADMIN — GUAIFENESIN AND DEXTROMETHORPHAN 5 ML: 100; 10 SYRUP ORAL at 00:01

## 2025-05-11 RX ADMIN — INSULIN LISPRO 10 UNITS: 100 INJECTION, SOLUTION INTRAVENOUS; SUBCUTANEOUS at 12:40

## 2025-05-11 RX ADMIN — ARFORMOTEROL TARTRATE 15 MCG: 15 SOLUTION RESPIRATORY (INHALATION) at 09:03

## 2025-05-11 RX ADMIN — INSULIN LISPRO 10 UNITS: 100 INJECTION, SOLUTION INTRAVENOUS; SUBCUTANEOUS at 17:40

## 2025-05-11 RX ADMIN — INSULIN LISPRO 4 UNITS: 100 INJECTION, SOLUTION INTRAVENOUS; SUBCUTANEOUS at 12:40

## 2025-05-11 RX ADMIN — LEVOTHYROXINE SODIUM 112 MCG: 0.11 TABLET ORAL at 06:28

## 2025-05-11 RX ADMIN — INSULIN LISPRO 4 UNITS: 100 INJECTION, SOLUTION INTRAVENOUS; SUBCUTANEOUS at 17:40

## 2025-05-11 RX ADMIN — INSULIN GLARGINE 27 UNITS: 100 INJECTION, SOLUTION SUBCUTANEOUS at 08:31

## 2025-05-11 RX ADMIN — FUROSEMIDE 20 MG: 20 TABLET ORAL at 09:45

## 2025-05-11 RX ADMIN — DIPHENHYDRAMINE HYDROCHLORIDE 50 MG: 25 TABLET ORAL at 06:28

## 2025-05-11 RX ADMIN — BUDESONIDE 500 MCG: 0.5 INHALANT RESPIRATORY (INHALATION) at 09:03

## 2025-05-11 RX ADMIN — GUAIFENESIN AND DEXTROMETHORPHAN 5 ML: 100; 10 SYRUP ORAL at 23:00

## 2025-05-11 RX ADMIN — FAMOTIDINE 20 MG: 20 TABLET, FILM COATED ORAL at 08:31

## 2025-05-11 RX ADMIN — INSULIN LISPRO 8 UNITS: 100 INJECTION, SOLUTION INTRAVENOUS; SUBCUTANEOUS at 20:24

## 2025-05-11 RX ADMIN — GUAIFENESIN AND DEXTROMETHORPHAN 5 ML: 100; 10 SYRUP ORAL at 12:39

## 2025-05-11 RX ADMIN — IPRATROPIUM BROMIDE AND ALBUTEROL SULFATE 1 DOSE: .5; 3 SOLUTION RESPIRATORY (INHALATION) at 21:46

## 2025-05-11 RX ADMIN — GUAIFENESIN AND DEXTROMETHORPHAN 5 ML: 100; 10 SYRUP ORAL at 17:38

## 2025-05-11 RX ADMIN — GUAIFENESIN AND DEXTROMETHORPHAN 5 ML: 100; 10 SYRUP ORAL at 06:28

## 2025-05-11 RX ADMIN — DIPHENHYDRAMINE HYDROCHLORIDE 25 MG: 25 TABLET ORAL at 15:57

## 2025-05-11 RX ADMIN — INSULIN LISPRO 10 UNITS: 100 INJECTION, SOLUTION INTRAVENOUS; SUBCUTANEOUS at 08:32

## 2025-05-11 RX ADMIN — SIMETHICONE 80 MG: 80 TABLET, CHEWABLE ORAL at 20:35

## 2025-05-11 RX ADMIN — FAMOTIDINE 20 MG: 20 TABLET, FILM COATED ORAL at 20:25

## 2025-05-11 RX ADMIN — ENOXAPARIN SODIUM 30 MG: 100 INJECTION SUBCUTANEOUS at 20:25

## 2025-05-11 RX ADMIN — ARFORMOTEROL TARTRATE 15 MCG: 15 SOLUTION RESPIRATORY (INHALATION) at 21:46

## 2025-05-11 RX ADMIN — BUDESONIDE 500 MCG: 0.5 INHALANT RESPIRATORY (INHALATION) at 21:46

## 2025-05-11 RX ADMIN — INSULIN GLARGINE 27 UNITS: 100 INJECTION, SOLUTION SUBCUTANEOUS at 20:25

## 2025-05-11 ASSESSMENT — PAIN SCALES - WONG BAKER: WONGBAKER_NUMERICALRESPONSE: NO HURT

## 2025-05-11 ASSESSMENT — PAIN SCALES - GENERAL
PAINLEVEL_OUTOF10: 0
PAINLEVEL_OUTOF10: 0

## 2025-05-11 NOTE — PROGRESS NOTES
05/11/25 0419   NIV Type   Ventilator ID [id number]   NIV Started/Stopped On   Equipment Type V60   Mode Bilevel   Mask Type Full face mask   Mask Size Medium   Assessment   Pulse 77   Respirations 12   SpO2 95 %   Breath Sounds   Respiratory Pattern Regular   Breath Sounds Bilateral Diminished   Settings/Measurements   PIP Observed 10 cm H20   IPAP 10 cmH20   CPAP/EPAP 5 cmH2O   Vt (Measured) 957 mL   Rate Ordered 10   FiO2  30 %   I Time/ I Time % 0.9 s   Minute Volume (L/min) 14.1 Liters   Mask Leak (lpm) 5 lpm   Patient's Home Machine No   Alarm Settings   Alarms On Y   Low Pressure (cmH2O) 3 cmH2O   High Pressure (cmH2O) 30 cmH2O   RR Low (bpm) 11   RR High (bpm) 40 br/min

## 2025-05-11 NOTE — PROGRESS NOTES
this evening. BG results and administered medications reviewed with Dr. Sen. Per Dr. Sen, Lantus order modified from 22 units BID to 27 units BID in an effort to improve glycemic control.

## 2025-05-11 NOTE — PLAN OF CARE
Problem: Chronic Conditions and Co-morbidities  Goal: Patient's chronic conditions and co-morbidity symptoms are monitored and maintained or improved  Outcome: Progressing  Flowsheets (Taken 5/10/2025 2115)  Care Plan - Patient's Chronic Conditions and Co-Morbidity Symptoms are Monitored and Maintained or Improved:   Monitor and assess patient's chronic conditions and comorbid symptoms for stability, deterioration, or improvement   Collaborate with multidisciplinary team to address chronic and comorbid conditions and prevent exacerbation or deterioration   Update acute care plan with appropriate goals if chronic or comorbid symptoms are exacerbated and prevent overall improvement and discharge     Problem: Pain  Goal: Verbalizes/displays adequate comfort level or baseline comfort level  Outcome: Progressing  Flowsheets (Taken 5/10/2025 2001)  Verbalizes/displays adequate comfort level or baseline comfort level: Encourage patient to monitor pain and request assistance     Problem: Skin/Tissue Integrity  Goal: Skin integrity remains intact  Description: 1.  Monitor for areas of redness and/or skin breakdown2.  Assess vascular access sites hourly3.  Every 4-6 hours minimum:  Change oxygen saturation probe site4.  Every 4-6 hours:  If on nasal continuous positive airway pressure, respiratory therapy assess nares and determine need for appliance change or resting period  Outcome: Progressing  Flowsheets (Taken 5/10/2025 2115)  Skin Integrity Remains Intact: Monitor for areas of redness and/or skin breakdown     Problem: Safety - Adult  Goal: Free from fall injury  Outcome: Progressing     Problem: ABCDS Injury Assessment  Goal: Absence of physical injury  Outcome: Progressing  Flowsheets (Taken 5/11/2025 0305)  Absence of Physical Injury: Implement safety measures based on patient assessment

## 2025-05-12 LAB
ANION GAP SERPL CALCULATED.3IONS-SCNC: 8 MMOL/L (ref 3–16)
BASOPHILS # BLD: 0.1 K/UL (ref 0–0.2)
BASOPHILS NFR BLD: 1.4 %
BUN SERPL-MCNC: 17 MG/DL (ref 7–20)
CALCIUM SERPL-MCNC: 9 MG/DL (ref 8.3–10.6)
CHLORIDE SERPL-SCNC: 103 MMOL/L (ref 99–110)
CO2 SERPL-SCNC: 28 MMOL/L (ref 21–32)
CREAT SERPL-MCNC: 0.7 MG/DL (ref 0.6–1.1)
DEPRECATED RDW RBC AUTO: 18.6 % (ref 12.4–15.4)
EOSINOPHIL # BLD: 0.4 K/UL (ref 0–0.6)
EOSINOPHIL NFR BLD: 4.1 %
GFR SERPLBLD CREATININE-BSD FMLA CKD-EPI: >90 ML/MIN/{1.73_M2}
GLUCOSE BLD-MCNC: 170 MG/DL (ref 70–99)
GLUCOSE BLD-MCNC: 218 MG/DL (ref 70–99)
GLUCOSE BLD-MCNC: 221 MG/DL (ref 70–99)
GLUCOSE BLD-MCNC: 244 MG/DL (ref 70–99)
GLUCOSE SERPL-MCNC: 187 MG/DL (ref 70–99)
HCT VFR BLD AUTO: 46.6 % (ref 36–48)
HGB BLD-MCNC: 15.3 G/DL (ref 12–16)
LYMPHOCYTES # BLD: 2.1 K/UL (ref 1–5.1)
LYMPHOCYTES NFR BLD: 21.6 %
MCH RBC QN AUTO: 26.9 PG (ref 26–34)
MCHC RBC AUTO-ENTMCNC: 32.8 G/DL (ref 31–36)
MCV RBC AUTO: 82 FL (ref 80–100)
MONOCYTES # BLD: 0.8 K/UL (ref 0–1.3)
MONOCYTES NFR BLD: 8.4 %
NEUTROPHILS # BLD: 6.3 K/UL (ref 1.7–7.7)
NEUTROPHILS NFR BLD: 64.5 %
PERFORMED ON: ABNORMAL
PLATELET # BLD AUTO: 218 K/UL (ref 135–450)
PMV BLD AUTO: 8.3 FL (ref 5–10.5)
POTASSIUM SERPL-SCNC: 4.4 MMOL/L (ref 3.5–5.1)
RBC # BLD AUTO: 5.68 M/UL (ref 4–5.2)
SODIUM SERPL-SCNC: 139 MMOL/L (ref 136–145)
WBC # BLD AUTO: 9.8 K/UL (ref 4–11)

## 2025-05-12 PROCEDURE — 6360000002 HC RX W HCPCS: Performed by: STUDENT IN AN ORGANIZED HEALTH CARE EDUCATION/TRAINING PROGRAM

## 2025-05-12 PROCEDURE — 97110 THERAPEUTIC EXERCISES: CPT

## 2025-05-12 PROCEDURE — 85025 COMPLETE CBC W/AUTO DIFF WBC: CPT

## 2025-05-12 PROCEDURE — 6370000000 HC RX 637 (ALT 250 FOR IP): Performed by: STUDENT IN AN ORGANIZED HEALTH CARE EDUCATION/TRAINING PROGRAM

## 2025-05-12 PROCEDURE — 97116 GAIT TRAINING THERAPY: CPT

## 2025-05-12 PROCEDURE — 80048 BASIC METABOLIC PNL TOTAL CA: CPT

## 2025-05-12 PROCEDURE — 97530 THERAPEUTIC ACTIVITIES: CPT

## 2025-05-12 PROCEDURE — 94640 AIRWAY INHALATION TREATMENT: CPT

## 2025-05-12 PROCEDURE — 1280000000 HC REHAB R&B

## 2025-05-12 PROCEDURE — 97535 SELF CARE MNGMENT TRAINING: CPT

## 2025-05-12 RX ORDER — FLUTICASONE PROPIONATE 50 MCG
1 SPRAY, SUSPENSION (ML) NASAL DAILY PRN
Status: DISCONTINUED | OUTPATIENT
Start: 2025-05-12 | End: 2025-05-16 | Stop reason: HOSPADM

## 2025-05-12 RX ORDER — SKIN PROTECTANT 44 G/100G
OINTMENT TOPICAL 4 TIMES DAILY PRN
Status: DISCONTINUED | OUTPATIENT
Start: 2025-05-12 | End: 2025-05-16 | Stop reason: HOSPADM

## 2025-05-12 RX ADMIN — ENOXAPARIN SODIUM 30 MG: 100 INJECTION SUBCUTANEOUS at 21:08

## 2025-05-12 RX ADMIN — FAMOTIDINE 20 MG: 20 TABLET, FILM COATED ORAL at 09:00

## 2025-05-12 RX ADMIN — INSULIN LISPRO 4 UNITS: 100 INJECTION, SOLUTION INTRAVENOUS; SUBCUTANEOUS at 14:06

## 2025-05-12 RX ADMIN — SIMETHICONE 80 MG: 80 TABLET, CHEWABLE ORAL at 06:09

## 2025-05-12 RX ADMIN — BUDESONIDE 500 MCG: 0.5 INHALANT RESPIRATORY (INHALATION) at 05:59

## 2025-05-12 RX ADMIN — LEVOTHYROXINE SODIUM 112 MCG: 0.11 TABLET ORAL at 06:09

## 2025-05-12 RX ADMIN — DIPHENHYDRAMINE HYDROCHLORIDE 25 MG: 25 TABLET ORAL at 16:25

## 2025-05-12 RX ADMIN — DIPHENHYDRAMINE HYDROCHLORIDE 25 MG: 25 TABLET ORAL at 23:20

## 2025-05-12 RX ADMIN — IPRATROPIUM BROMIDE AND ALBUTEROL SULFATE 1 DOSE: .5; 3 SOLUTION RESPIRATORY (INHALATION) at 21:18

## 2025-05-12 RX ADMIN — FAMOTIDINE 20 MG: 20 TABLET, FILM COATED ORAL at 21:07

## 2025-05-12 RX ADMIN — ARFORMOTEROL TARTRATE 15 MCG: 15 SOLUTION RESPIRATORY (INHALATION) at 21:18

## 2025-05-12 RX ADMIN — INSULIN GLARGINE 27 UNITS: 100 INJECTION, SOLUTION SUBCUTANEOUS at 21:08

## 2025-05-12 RX ADMIN — OXYBUTYNIN CHLORIDE 15 MG: 5 TABLET, EXTENDED RELEASE ORAL at 09:00

## 2025-05-12 RX ADMIN — INSULIN LISPRO 10 UNITS: 100 INJECTION, SOLUTION INTRAVENOUS; SUBCUTANEOUS at 17:18

## 2025-05-12 RX ADMIN — GUAIFENESIN AND DEXTROMETHORPHAN 5 ML: 100; 10 SYRUP ORAL at 14:06

## 2025-05-12 RX ADMIN — BUDESONIDE 500 MCG: 0.5 INHALANT RESPIRATORY (INHALATION) at 21:18

## 2025-05-12 RX ADMIN — INSULIN LISPRO 4 UNITS: 100 INJECTION, SOLUTION INTRAVENOUS; SUBCUTANEOUS at 21:08

## 2025-05-12 RX ADMIN — DIPHENHYDRAMINE HYDROCHLORIDE 25 MG: 25 TABLET ORAL at 06:09

## 2025-05-12 RX ADMIN — ARFORMOTEROL TARTRATE 15 MCG: 15 SOLUTION RESPIRATORY (INHALATION) at 05:58

## 2025-05-12 RX ADMIN — GUAIFENESIN AND DEXTROMETHORPHAN 5 ML: 100; 10 SYRUP ORAL at 17:19

## 2025-05-12 RX ADMIN — ENOXAPARIN SODIUM 30 MG: 100 INJECTION SUBCUTANEOUS at 09:01

## 2025-05-12 RX ADMIN — INSULIN LISPRO 4 UNITS: 100 INJECTION, SOLUTION INTRAVENOUS; SUBCUTANEOUS at 09:00

## 2025-05-12 RX ADMIN — GUAIFENESIN AND DEXTROMETHORPHAN 5 ML: 100; 10 SYRUP ORAL at 23:20

## 2025-05-12 RX ADMIN — FUROSEMIDE 20 MG: 20 TABLET ORAL at 09:00

## 2025-05-12 RX ADMIN — IPRATROPIUM BROMIDE AND ALBUTEROL SULFATE 1 DOSE: .5; 3 SOLUTION RESPIRATORY (INHALATION) at 05:59

## 2025-05-12 RX ADMIN — INSULIN LISPRO 10 UNITS: 100 INJECTION, SOLUTION INTRAVENOUS; SUBCUTANEOUS at 14:06

## 2025-05-12 RX ADMIN — GUAIFENESIN AND DEXTROMETHORPHAN 5 ML: 100; 10 SYRUP ORAL at 06:09

## 2025-05-12 RX ADMIN — ACETAMINOPHEN 650 MG: 325 TABLET ORAL at 06:09

## 2025-05-12 RX ADMIN — INSULIN LISPRO 10 UNITS: 100 INJECTION, SOLUTION INTRAVENOUS; SUBCUTANEOUS at 09:00

## 2025-05-12 RX ADMIN — INSULIN GLARGINE 27 UNITS: 100 INJECTION, SOLUTION SUBCUTANEOUS at 09:00

## 2025-05-12 RX ADMIN — FLUTICASONE PROPIONATE 1 SPRAY: 50 SPRAY, METERED NASAL at 14:06

## 2025-05-12 ASSESSMENT — PAIN SCALES - GENERAL
PAINLEVEL_OUTOF10: 1
PAINLEVEL_OUTOF10: 3
PAINLEVEL_OUTOF10: 0

## 2025-05-12 ASSESSMENT — PAIN - FUNCTIONAL ASSESSMENT: PAIN_FUNCTIONAL_ASSESSMENT: ACTIVITIES ARE NOT PREVENTED

## 2025-05-12 ASSESSMENT — PAIN DESCRIPTION - LOCATION: LOCATION: GENERALIZED

## 2025-05-12 ASSESSMENT — PAIN DESCRIPTION - DESCRIPTORS: DESCRIPTORS: DISCOMFORT

## 2025-05-12 ASSESSMENT — PAIN DESCRIPTION - ORIENTATION: ORIENTATION: OTHER (COMMENT)

## 2025-05-12 NOTE — PATIENT CARE CONFERENCE
[facility] Inpatient Rehabilitation Department  Weekly Team Conference Note    Patient Name: Jaden Denise      MRN: [id number]  : 1965  (60 y.o.) Gender: female     The team conference for this patient was held on 25 at 11am and was led by:  Abdon Sen MD     CASE MANAGEMENT:  Assessment: Patient is 60 year old female from home with adult son. Patient was admitted to ARU for Acute respiratory failure with hypoxia (HCC). Discharge date is TBD. Patient is A&O x 4. She has active insurance with a PCP in the community. Patient ambulates with a rollator requiring stand by assistance. Disposition is home with healthcare. She has home care through [facility] provided by [facility]. DME is TBD, but already owns a cane, electric wheelchair and bariatric stand up walker. SW will continue to follow therapy and Dr. Sen recommendations and discuss closer to discharge.     PHYSICAL THERAPY    Current Status:  Bed Mobility:  Supine to sit: stand by assistance  Comments:   Transfers:  Sit to stand transfer: stand by assistance  Stand to sit transfer: stand by assistance  Stand step transfer: stand by assistance  Comments: Consistent VC for hand placement and correct device management during transfer  Ambulation:  Surface:level surface  Assistive Device: rolling walker  Other Appliance: supplemental O2  Assistance: stand by assistance  Distance: 40', 175',  Gait Mechanics: WBOS, M/L sway, decreased step height/length, decreased freddy   Comments:   Ambulation Trial 2  Surface:level surface  Assistive Device: rollator (4WRW)  Other Appliance: supplemental O2  Assistance: stand by assistance  Distance: 40' + 125'  Gait Mechanics: Similar to RW with increased but controlled freddy  Comments:    Stair Mobility:  Number of Steps: 3  Step Height: 6 inch  Hand Rails: (B) handrail  Other Appliance: supplemental O2  Assistance: contact guard assistance  Comments: Step to

## 2025-05-12 NOTE — PROGRESS NOTES
Jaden Denise  5/12/2025  [id number]    Chief Complaint: Acute respiratory failure with hypoxia (HCC)    Subjective    No acute events overnight.     Patient reports that she is doing well. Continues to improve her endurance, noting less dyspnea at rest and with exertion. Urinary frequency improving with oxbutynin. Reports some dry mouth. Denies dry eyes, sedation, constipation, urinary retention/hesitancy. Still having itching rash involving trunk and extremities. Last Bowel Movement:  05/11/25          Objective    Patient Vitals for the past 24 hrs:   BP Temp Temp src Pulse Resp SpO2 Weight   05/12/25 0900 124/63 97.8 °F (36.6 °C) Oral 86 18 91 % --   05/12/25 0635 -- -- -- -- -- -- (!) 148.2 kg (326 lb 12.8 oz)   05/12/25 0601 -- -- -- 78 18 96 % --   05/12/25 0345 -- -- -- -- 18 -- --   05/12/25 0145 -- -- -- -- 18 -- --   05/11/25 2242 (!) 112/57 97.8 °F (36.6 °C) Oral 92 20 92 % --   05/11/25 2149 -- -- -- 82 18 96 % --   05/11/25 1953 -- -- -- -- -- -- (!) 146.9 kg (323 lb 12.8 oz)     Patient Vitals for the past 96 hrs (Last 3 readings):   Weight   05/12/25 0635 (!) 148.2 kg (326 lb 12.8 oz)   05/11/25 1953 (!) 146.9 kg (323 lb 12.8 oz)   05/10/25 0600 (!) 144.8 kg (319 lb 4.8 oz)      Gen: No distress.  HEENT: Normocephalic, atraumatic.  CV: Regular rate and rhythm. Extremities warm, well perfused.   Resp: No respiratory distress on 3L O2 via NC. CTAB.  Abd: Soft, nontender.  Ext: Trace BLE edema.  Skin: Excoriations RUE. No wheal/flare noted involving trunk or extremities.   Neuro: Alert, oriented, appropriately interactive.     Laboratory data:   Na/K/Cl/CO2:  139/4.4/103/28 (05/12 0601)   BUN/Cr/glu/ALT/AST/amyl/lip:  17/0.7/--/--/--/--/-- (05/12 0601)    WBC/Hgb/Hct/Plts:  9.8/15.3/46.6/218 (05/12 0601)     Therapy progress:       PT    Supine to Sit: Supervision or touching assistance  Sit to Supine: Supervision or touching assistance   Sit to Stand: Supervision or touching assistance  Chair/Bed to

## 2025-05-12 NOTE — INTERDISCIPLINARY ROUNDS
Spiritual Health History and Assessment/Progress Note  [facility]    Interdisciplinary rounds,  ,  ,      Name: Jaden Denise MRN: [id number]    Age: 60 y.o.     Sex: female   Language: English   Lutheran: Sikhism   Acute respiratory failure with hypoxia (HCC)     Date: 5/12/2025            Total Time Calculated: 15 min              Spiritual Assessment began in [facility]        Referral/Consult From: Rounding   Encounter Overview/Reason: Interdisciplinary rounds  Service Provided For: Patient    Luz, Belief, Meaning:   Patient identifies as spiritual, is connected with a luz tradition or spiritual practice, and has beliefs or practices that help with coping during difficult times  Family/Friends No family/friends present      Importance and Influence:  Patient has spiritual/personal beliefs that influence decisions regarding their health  Family/Friends No family/friends present    Community:  Patient is connected with a spiritual community and feels well-supported. Support system includes: Children and [facility]  Family/Friends No family/friends present    Assessment and Plan of Care:     Patient Interventions include: Facilitated expression of thoughts and feelings, Explored spiritual coping/struggle/distress, Engaged in theological reflection, and Affirmed coping skills/support systems  Family/Friends Interventions include: No family/friends present    Patient Plan of Care: Spiritual Care available upon further referral  Family/Friends Plan of Care: No family/friends present    Electronically signed by Chaplain Cami on 5/12/2025 at 4:29 PM

## 2025-05-12 NOTE — PROGRESS NOTES
session. No reports of pain. Continues to be on 1.5 L of O2.     Pt requesting to try mobility to and from bathroom without device. Pt completed transfer from recliner with supervision and completed mobility into bathroom with no AD and SBA.     Grooming: stand by assistance  Grooming Comments: completed oral hygiene standing at sink   Upper Extremity Bathing: supervision  Lower Extremity Bathing: stand by assistance requires verbal cueing   Bathing Comments: pt completed shower seated on TTB in walk in shower. Pt remained seated for majority of shower, only standing for washing periarea. Pt utilized grab bar while in stance. Cues for using long handled sponge for feet and lower legs- pt with improved technique this date. Pt declining to wash hair   Upper Extremity Dressing: supervision  Lower Extremity Dressing: setup assistance stand by assistance  Dressing Comments: Pt doffed bra and donned gown seated on TTB. Doffed socks seated on TTB with reacher. Donned socks seated on bed. Doffed/donned pullup seated on TTB. Able to thread without use of reacher. Managed over hips in stance with SBA  Toileting: stand by assistance.    Toileting Comments: pt voided urine prior to shower and BM at end of session. SBA for pericare and clothing mgmt. Pt attempted using toilet aide for pericare following BM but pt did not like.  General Comments: lotion/cream applied to back for itching. Interdry applied to folds.     Pt completed mobility back to recliner with no AD and SBA. Pt on 1.5 L of O2 for session. SpO2 89-92%. Handout provided on tub transfer bench. Pt reports she plans to order one. Handouts and education provided on energy conservation. Pt verbalized understanding.     Pt left in recliner with alarm on,call button in reach and all needs met. Nursing present.         Functional Outcomes           Cognition  WFL  Orientation:    alert and oriented x 4  Command Following:   WFL     Education  Barriers To Learning:

## 2025-05-12 NOTE — PLAN OF CARE
Problem: Chronic Conditions and Co-morbidities  Goal: Patient's chronic conditions and co-morbidity symptoms are monitored and maintained or improved  Outcome: Progressing  Flowsheets (Taken 5/11/2025 2249)  Care Plan - Patient's Chronic Conditions and Co-Morbidity Symptoms are Monitored and Maintained or Improved:   Monitor and assess patient's chronic conditions and comorbid symptoms for stability, deterioration, or improvement   Collaborate with multidisciplinary team to address chronic and comorbid conditions and prevent exacerbation or deterioration   Update acute care plan with appropriate goals if chronic or comorbid symptoms are exacerbated and prevent overall improvement and discharge     Problem: Pain  Goal: Verbalizes/displays adequate comfort level or baseline comfort level  Outcome: Progressing  Flowsheets (Taken 5/11/2025 2242)  Verbalizes/displays adequate comfort level or baseline comfort level: Encourage patient to monitor pain and request assistance     Problem: Skin/Tissue Integrity  Goal: Skin integrity remains intact  Description: 1.  Monitor for areas of redness and/or skin breakdown2.  Assess vascular access sites hourly3.  Every 4-6 hours minimum:  Change oxygen saturation probe site4.  Every 4-6 hours:  If on nasal continuous positive airway pressure, respiratory therapy assess nares and determine need for appliance change or resting period  Outcome: Progressing  Flowsheets (Taken 5/11/2025 2249)  Skin Integrity Remains Intact: Monitor for areas of redness and/or skin breakdown     Problem: Safety - Adult  Goal: Free from fall injury  Outcome: Progressing     Problem: ABCDS Injury Assessment  Goal: Absence of physical injury  Outcome: Progressing  Flowsheets (Taken 5/12/2025 0027)  Absence of Physical Injury: Implement safety measures based on patient assessment

## 2025-05-12 NOTE — PROGRESS NOTES
Time Out  0820       Minutes  50           Second Session Therapy Time:   Individual Group Co-treatment   Time In 1415       Time Out 1505       Minutes 50         Total Treatment Minutes:  100 minutes     Electronically Signed By: Anuj Chapin PT

## 2025-05-13 LAB
GLUCOSE BLD-MCNC: 175 MG/DL (ref 70–99)
GLUCOSE BLD-MCNC: 190 MG/DL (ref 70–99)
GLUCOSE BLD-MCNC: 198 MG/DL (ref 70–99)
GLUCOSE BLD-MCNC: 235 MG/DL (ref 70–99)
PERFORMED ON: ABNORMAL

## 2025-05-13 PROCEDURE — 97112 NEUROMUSCULAR REEDUCATION: CPT

## 2025-05-13 PROCEDURE — 97116 GAIT TRAINING THERAPY: CPT

## 2025-05-13 PROCEDURE — 94660 CPAP INITIATION&MGMT: CPT

## 2025-05-13 PROCEDURE — 97110 THERAPEUTIC EXERCISES: CPT

## 2025-05-13 PROCEDURE — 6360000002 HC RX W HCPCS: Performed by: STUDENT IN AN ORGANIZED HEALTH CARE EDUCATION/TRAINING PROGRAM

## 2025-05-13 PROCEDURE — 1280000000 HC REHAB R&B

## 2025-05-13 PROCEDURE — 97530 THERAPEUTIC ACTIVITIES: CPT

## 2025-05-13 PROCEDURE — 97535 SELF CARE MNGMENT TRAINING: CPT

## 2025-05-13 PROCEDURE — 94640 AIRWAY INHALATION TREATMENT: CPT

## 2025-05-13 PROCEDURE — 6370000000 HC RX 637 (ALT 250 FOR IP): Performed by: STUDENT IN AN ORGANIZED HEALTH CARE EDUCATION/TRAINING PROGRAM

## 2025-05-13 RX ADMIN — GUAIFENESIN AND DEXTROMETHORPHAN 5 ML: 100; 10 SYRUP ORAL at 05:18

## 2025-05-13 RX ADMIN — ARFORMOTEROL TARTRATE 15 MCG: 15 SOLUTION RESPIRATORY (INHALATION) at 05:37

## 2025-05-13 RX ADMIN — INSULIN LISPRO 10 UNITS: 100 INJECTION, SOLUTION INTRAVENOUS; SUBCUTANEOUS at 12:35

## 2025-05-13 RX ADMIN — FUROSEMIDE 20 MG: 20 TABLET ORAL at 09:07

## 2025-05-13 RX ADMIN — BUDESONIDE 500 MCG: 0.5 INHALANT RESPIRATORY (INHALATION) at 05:36

## 2025-05-13 RX ADMIN — BUDESONIDE 500 MCG: 0.5 INHALANT RESPIRATORY (INHALATION) at 20:51

## 2025-05-13 RX ADMIN — INSULIN LISPRO 4 UNITS: 100 INJECTION, SOLUTION INTRAVENOUS; SUBCUTANEOUS at 21:17

## 2025-05-13 RX ADMIN — DIPHENHYDRAMINE HYDROCHLORIDE 25 MG: 25 TABLET ORAL at 21:22

## 2025-05-13 RX ADMIN — FAMOTIDINE 20 MG: 20 TABLET, FILM COATED ORAL at 09:07

## 2025-05-13 RX ADMIN — IPRATROPIUM BROMIDE AND ALBUTEROL SULFATE 1 DOSE: .5; 3 SOLUTION RESPIRATORY (INHALATION) at 05:40

## 2025-05-13 RX ADMIN — INSULIN LISPRO 10 UNITS: 100 INJECTION, SOLUTION INTRAVENOUS; SUBCUTANEOUS at 17:11

## 2025-05-13 RX ADMIN — ENOXAPARIN SODIUM 30 MG: 100 INJECTION SUBCUTANEOUS at 21:18

## 2025-05-13 RX ADMIN — INSULIN LISPRO 4 UNITS: 100 INJECTION, SOLUTION INTRAVENOUS; SUBCUTANEOUS at 17:11

## 2025-05-13 RX ADMIN — IPRATROPIUM BROMIDE AND ALBUTEROL SULFATE 1 DOSE: .5; 3 SOLUTION RESPIRATORY (INHALATION) at 20:51

## 2025-05-13 RX ADMIN — LEVOTHYROXINE SODIUM 112 MCG: 0.11 TABLET ORAL at 05:18

## 2025-05-13 RX ADMIN — FAMOTIDINE 20 MG: 20 TABLET, FILM COATED ORAL at 21:18

## 2025-05-13 RX ADMIN — FLUTICASONE PROPIONATE 1 SPRAY: 50 SPRAY, METERED NASAL at 09:07

## 2025-05-13 RX ADMIN — INSULIN LISPRO 4 UNITS: 100 INJECTION, SOLUTION INTRAVENOUS; SUBCUTANEOUS at 12:35

## 2025-05-13 RX ADMIN — INSULIN GLARGINE 27 UNITS: 100 INJECTION, SOLUTION SUBCUTANEOUS at 09:08

## 2025-05-13 RX ADMIN — OXYBUTYNIN CHLORIDE 15 MG: 5 TABLET, EXTENDED RELEASE ORAL at 09:07

## 2025-05-13 RX ADMIN — DIPHENHYDRAMINE HYDROCHLORIDE 25 MG: 25 TABLET ORAL at 05:18

## 2025-05-13 RX ADMIN — ENOXAPARIN SODIUM 30 MG: 100 INJECTION SUBCUTANEOUS at 09:07

## 2025-05-13 RX ADMIN — GUAIFENESIN AND DEXTROMETHORPHAN 5 ML: 100; 10 SYRUP ORAL at 21:22

## 2025-05-13 RX ADMIN — SIMETHICONE 80 MG: 80 TABLET, CHEWABLE ORAL at 09:07

## 2025-05-13 RX ADMIN — ARFORMOTEROL TARTRATE 15 MCG: 15 SOLUTION RESPIRATORY (INHALATION) at 20:51

## 2025-05-13 RX ADMIN — INSULIN GLARGINE 27 UNITS: 100 INJECTION, SOLUTION SUBCUTANEOUS at 21:18

## 2025-05-13 RX ADMIN — SKIN PROTECTANT: 44 OINTMENT TOPICAL at 09:17

## 2025-05-13 RX ADMIN — INSULIN LISPRO 10 UNITS: 100 INJECTION, SOLUTION INTRAVENOUS; SUBCUTANEOUS at 09:08

## 2025-05-13 ASSESSMENT — PAIN SCALES - GENERAL: PAINLEVEL_OUTOF10: 0

## 2025-05-13 NOTE — PROGRESS NOTES
unsupported position  Dynamic Sitting Balance: good: independent with functional balance in unsupported position  Static Standing Balance: fair (-): maintains balance at SBA with use of UE support  Dynamic Standing Balance: fair (-): maintains balance at SBA with use of UE support  Comments:    Other Therapeutic Interventions    First Session:   See above functional mobility.  In addition to functional mobility, patient completes heel/toe rocks completed standing with minimal ability to complete (B) heel raise.  Dynamic standing balance activitiy completed with blaze pods at 6\" steps with (B) UE fingertip balance support (formula reaction with 1 active/0 distractive place on ground and 6\" step).  Patient completes ~ 20 LE targeted stepping, maintains dynamic standing balance at CGA.    Second Session:   Pt seated in recliner upon arrival, denies pain, agreeable to PT session.  Patient resting on room air on arrival with SpO2 90%.  Sit <=> stand transfers completed throughout session at supervision level.  Pt ambulates 175' + 150' within session using 4WRW at SBA with same mechanics as first session. Patient SpO2 s/p ambulation: 87-88%.  Patient recovers to 92% with seated rest breaks throughout session.  Seated stepper L4 x 7 min to improve strength and cardiovascular endurance.  Airex forward step up 1 x 6 ea without UE support requiring up to min (A) for balance stabilization.  Static CELINE standing activities completed on airex: static postural hold, standing (B) UE horizontal AB, horizontal AB + trunk rotation.  Seated rest breaks taken between all activities to allow recovery of SpO2 and HR.  Upon return to room, pt remains up in recliner with chair alarm and call light within reach.  No new complaints following session.    Functional Outcomes                 Cognition  WFL  Orientation:    alert and oriented x 4  Command Following:   WFL    Education  Barriers To Learning: none  Patient Education: patient

## 2025-05-13 NOTE — PLAN OF CARE
Problem: Chronic Conditions and Co-morbidities  Goal: Patient's chronic conditions and co-morbidity symptoms are monitored and maintained or improved  Outcome: Progressing  Flowsheets (Taken 5/12/2025 2103)  Care Plan - Patient's Chronic Conditions and Co-Morbidity Symptoms are Monitored and Maintained or Improved:   Monitor and assess patient's chronic conditions and comorbid symptoms for stability, deterioration, or improvement   Collaborate with multidisciplinary team to address chronic and comorbid conditions and prevent exacerbation or deterioration   Update acute care plan with appropriate goals if chronic or comorbid symptoms are exacerbated and prevent overall improvement and discharge     Problem: Discharge Planning  Goal: Discharge to home or other facility with appropriate resources  Outcome: Progressing  Flowsheets (Taken 5/12/2025 2103)  Discharge to home or other facility with appropriate resources:   Identify barriers to discharge with patient and caregiver   Refer to discharge planning if patient needs post-hospital services based on physician order or complex needs related to functional status, cognitive ability or social support system     Problem: Pain  Goal: Verbalizes/displays adequate comfort level or baseline comfort level  Outcome: Progressing     Problem: Skin/Tissue Integrity  Goal: Skin integrity remains intact  Description: 1.  Monitor for areas of redness and/or skin breakdown2.  Assess vascular access sites hourly3.  Every 4-6 hours minimum:  Change oxygen saturation probe site4.  Every 4-6 hours:  If on nasal continuous positive airway pressure, respiratory therapy assess nares and determine need for appliance change or resting period  Outcome: Progressing  Flowsheets  Taken 5/12/2025 2346 by Bhumika Mckeon RN  Skin Integrity Remains Intact: Monitor for areas of redness and/or skin breakdown  Taken 5/12/2025 1823 by Nadya Casarez, RN  Skin Integrity Remains Intact: Monitor

## 2025-05-13 NOTE — PROGRESS NOTES
[facility] - Inpatient Rehabilitation Department   Phone: [phone]    Occupational Therapy    [] Initial Evaluation            [x] Daily Treatment Note         [] Discharge Summary      Patient: Jaden Denise   : 1965   MRN: [id number]   Date of Service:  2025    Admitting Diagnosis:  Acute respiratory failure with hypoxia (HCC)  Current Admission Summary:   Jaden Denise is a 60 y.o. female with PMHx notable for T2DM, COPD, hypothyroidism, tobacco use disorder, morbid obesity who presented on 2025 with dyspnea and cough.  She was hypoxic on room air, CT chest showed patchy bilateral infiltrates, left greater than right.  She required NIV with BiPAP.  She was started on broad-spectrum IV antibiotics, IV steroids and breathing treatments for COPD exacerbation and bilateral multifocal pneumonia.  Pulmonology consulted, following.  She completed course of IV ceftriaxone and azithromycin.  She initially required high flow nasal cannula, but has been able to wean down to low-flow.  She is requiring BiPAP overnight, with concern for underlying ARISTEO.  She was also diuresed with IV Lasix, now transitioned to oral.   Past Medical History:  has a past medical history of Anxiety, Bladder incontinence, Contact dermatitis, COPD (chronic obstructive pulmonary disease) (HCC), Depression, Diabetes mellitus (HCC), Lymphedema, Neuropathy, Rosacea, and Thyroid disease.  Past Surgical History:  has a past surgical history that includes Spinal fusion; Ovary removal (Left); Endometrial ablation; Tubal ligation (Bilateral); and Tonsillectomy.    Discharge Recommendations: Home with OT     DME Required For Discharge: reacher, tub transfer bench, long handled sponge, grab bars    Precautions/Restrictions: high fall risk  Weight Bearing Restrictions: no restrictions    Required Braces/Orthotics: no braces required  Positional Restrictions:no positional restrictions    Pre-Admission Information   Lives

## 2025-05-13 NOTE — PLAN OF CARE
Jaden Denise was evaluated today and a DME order was entered for a bath/shower seat because she requires this to successfully complete daily living tasks of bathing and grooming.  Patient requires a bath/shower seat due to weakness and limited ability to transfer/navigate the setting.  Without the bath/shower seat, Jaden Denise is at increased risk for falls and would require increased assistance for ADL's and mobility.     Abdon Sen MD 05/13/25 2:15 PM

## 2025-05-13 NOTE — PATIENT CARE CONFERENCE
Team conference/Weekly Summary         Team conference held today. Patient is 60 year old female from home with adult son. Patient was admitted to ARU for Acute respiratory failure with hypoxia (HCC). Discharge date is 5/16/25. Patient is A&O x 4. She has active insurance with a PCP in the community. Patient ambulates with a rollator requiring stand by assistance. Disposition is home with healthcare. She has home care through [facility] provided by [facility]. DME is tub transfer bench. SW will continue to follow therapy and Dr. Sen recommendations and discuss closer to discharge.     [facility] and D[facility].     Electronically signed by LIMA Potter on 5/13/25 at 3:05 PM EDT

## 2025-05-13 NOTE — PROGRESS NOTES
Jaden Denise  5/13/2025  [id number]    Chief Complaint: Acute respiratory failure with hypoxia (HCC)    Subjective    No acute events overnight.     Patient reports that she is doing well today. Breathing comfortably on room air, denies rest dyspnea. Itching very mildly improved. Last Bowel Movement:  05/11/25          Objective    Patient Vitals for the past 24 hrs:   BP Temp Temp src Pulse Resp SpO2 Weight   05/13/25 0915 135/82 98.2 °F (36.8 °C) Oral 72 18 91 % --   05/13/25 0544 -- -- -- 79 18 97 % --   05/13/25 0513 -- -- -- -- -- -- (!) 148.7 kg (327 lb 12.8 oz)   05/12/25 2123 -- -- -- 76 18 97 % --   05/12/25 2103 123/76 98 °F (36.7 °C) Oral (!) 105 20 (!) 89 % --   05/12/25 1520 -- -- -- -- -- 93 % --     Patient Vitals for the past 96 hrs (Last 3 readings):   Weight   05/13/25 0513 (!) 148.7 kg (327 lb 12.8 oz)   05/12/25 0635 (!) 148.2 kg (326 lb 12.8 oz)   05/11/25 1953 (!) 146.9 kg (323 lb 12.8 oz)      Gen: No distress.  HEENT: Normocephalic, atraumatic.  CV: Regular rate and rhythm. Extremities warm, well perfused.   Resp: No respiratory distress on 3L O2 via NC. CTAB.  Abd: Soft, nontender.  Ext: Trace BLE edema.  Skin: Excoriations RUE. No wheal/flare noted involving trunk or extremities.   Neuro: Alert, oriented, appropriately interactive.     Laboratory data:   Na/K/Cl/CO2:  139/4.4/103/28 (05/12 0601)   BUN/Cr/glu/ALT/AST/amyl/lip:  17/0.7/--/--/--/--/-- (05/12 0601)    WBC/Hgb/Hct/Plts:  9.8/15.3/46.6/218 (05/12 0601)     Therapy progress:       PT    Supine to Sit: Supervision or touching assistance  Sit to Supine: Supervision or touching assistance   Sit to Stand: Supervision or touching assistance  Chair/Bed to Chair Transfer: Supervision or touching assistance  Car Transfer: Supervision or touching assistance  Ambulation 10 ft: Supervision or touching assistance  Ambulation 50 ft: Supervision or touching assistance  Ambulation 150 ft: Supervision or touching assistance  Stairs - 1 Step:

## 2025-05-14 LAB
ANION GAP SERPL CALCULATED.3IONS-SCNC: 11 MMOL/L (ref 3–16)
BASOPHILS # BLD: 0.1 K/UL (ref 0–0.2)
BASOPHILS NFR BLD: 0.8 %
BUN SERPL-MCNC: 11 MG/DL (ref 7–20)
CALCIUM SERPL-MCNC: 8.8 MG/DL (ref 8.3–10.6)
CHLORIDE SERPL-SCNC: 100 MMOL/L (ref 99–110)
CO2 SERPL-SCNC: 26 MMOL/L (ref 21–32)
CREAT SERPL-MCNC: 0.7 MG/DL (ref 0.6–1.1)
DEPRECATED RDW RBC AUTO: 19.1 % (ref 12.4–15.4)
EOSINOPHIL # BLD: 0.3 K/UL (ref 0–0.6)
EOSINOPHIL NFR BLD: 3.5 %
GFR SERPLBLD CREATININE-BSD FMLA CKD-EPI: >90 ML/MIN/{1.73_M2}
GLUCOSE BLD-MCNC: 126 MG/DL (ref 70–99)
GLUCOSE BLD-MCNC: 228 MG/DL (ref 70–99)
GLUCOSE BLD-MCNC: 269 MG/DL (ref 70–99)
GLUCOSE BLD-MCNC: 273 MG/DL (ref 70–99)
GLUCOSE SERPL-MCNC: 228 MG/DL (ref 70–99)
HCT VFR BLD AUTO: 45.5 % (ref 36–48)
HGB BLD-MCNC: 14.9 G/DL (ref 12–16)
LYMPHOCYTES # BLD: 2.3 K/UL (ref 1–5.1)
LYMPHOCYTES NFR BLD: 24.7 %
MCH RBC QN AUTO: 27.2 PG (ref 26–34)
MCHC RBC AUTO-ENTMCNC: 32.8 G/DL (ref 31–36)
MCV RBC AUTO: 82.8 FL (ref 80–100)
MONOCYTES # BLD: 0.8 K/UL (ref 0–1.3)
MONOCYTES NFR BLD: 8.6 %
NEUTROPHILS # BLD: 5.8 K/UL (ref 1.7–7.7)
NEUTROPHILS NFR BLD: 62.4 %
PERFORMED ON: ABNORMAL
PLATELET # BLD AUTO: 196 K/UL (ref 135–450)
PMV BLD AUTO: 8.4 FL (ref 5–10.5)
POTASSIUM SERPL-SCNC: 4.4 MMOL/L (ref 3.5–5.1)
RBC # BLD AUTO: 5.49 M/UL (ref 4–5.2)
SODIUM SERPL-SCNC: 137 MMOL/L (ref 136–145)
WBC # BLD AUTO: 9.2 K/UL (ref 4–11)

## 2025-05-14 PROCEDURE — 97530 THERAPEUTIC ACTIVITIES: CPT

## 2025-05-14 PROCEDURE — 97116 GAIT TRAINING THERAPY: CPT

## 2025-05-14 PROCEDURE — 97110 THERAPEUTIC EXERCISES: CPT

## 2025-05-14 PROCEDURE — 97535 SELF CARE MNGMENT TRAINING: CPT

## 2025-05-14 PROCEDURE — 6370000000 HC RX 637 (ALT 250 FOR IP): Performed by: STUDENT IN AN ORGANIZED HEALTH CARE EDUCATION/TRAINING PROGRAM

## 2025-05-14 PROCEDURE — 80048 BASIC METABOLIC PNL TOTAL CA: CPT

## 2025-05-14 PROCEDURE — 94640 AIRWAY INHALATION TREATMENT: CPT

## 2025-05-14 PROCEDURE — 94761 N-INVAS EAR/PLS OXIMETRY MLT: CPT

## 2025-05-14 PROCEDURE — 36415 COLL VENOUS BLD VENIPUNCTURE: CPT

## 2025-05-14 PROCEDURE — 94680 O2 UPTK RST&XERS DIR SIMPLE: CPT

## 2025-05-14 PROCEDURE — 85025 COMPLETE CBC W/AUTO DIFF WBC: CPT

## 2025-05-14 PROCEDURE — 6360000002 HC RX W HCPCS: Performed by: STUDENT IN AN ORGANIZED HEALTH CARE EDUCATION/TRAINING PROGRAM

## 2025-05-14 PROCEDURE — 1280000000 HC REHAB R&B

## 2025-05-14 RX ORDER — INSULIN GLARGINE 100 [IU]/ML
30 INJECTION, SOLUTION SUBCUTANEOUS 2 TIMES DAILY
Status: DISCONTINUED | OUTPATIENT
Start: 2025-05-14 | End: 2025-05-16 | Stop reason: HOSPADM

## 2025-05-14 RX ADMIN — INSULIN GLARGINE 27 UNITS: 100 INJECTION, SOLUTION SUBCUTANEOUS at 09:19

## 2025-05-14 RX ADMIN — GUAIFENESIN AND DEXTROMETHORPHAN 5 ML: 100; 10 SYRUP ORAL at 22:00

## 2025-05-14 RX ADMIN — ARFORMOTEROL TARTRATE 15 MCG: 15 SOLUTION RESPIRATORY (INHALATION) at 19:15

## 2025-05-14 RX ADMIN — ARFORMOTEROL TARTRATE 15 MCG: 15 SOLUTION RESPIRATORY (INHALATION) at 05:56

## 2025-05-14 RX ADMIN — FAMOTIDINE 20 MG: 20 TABLET, FILM COATED ORAL at 09:18

## 2025-05-14 RX ADMIN — IPRATROPIUM BROMIDE AND ALBUTEROL SULFATE 1 DOSE: .5; 3 SOLUTION RESPIRATORY (INHALATION) at 05:57

## 2025-05-14 RX ADMIN — GUAIFENESIN AND DEXTROMETHORPHAN 5 ML: 100; 10 SYRUP ORAL at 12:21

## 2025-05-14 RX ADMIN — INSULIN LISPRO 10 UNITS: 100 INJECTION, SOLUTION INTRAVENOUS; SUBCUTANEOUS at 09:19

## 2025-05-14 RX ADMIN — INSULIN LISPRO 8 UNITS: 100 INJECTION, SOLUTION INTRAVENOUS; SUBCUTANEOUS at 09:20

## 2025-05-14 RX ADMIN — OXYBUTYNIN CHLORIDE 15 MG: 5 TABLET, EXTENDED RELEASE ORAL at 09:18

## 2025-05-14 RX ADMIN — INSULIN LISPRO 10 UNITS: 100 INJECTION, SOLUTION INTRAVENOUS; SUBCUTANEOUS at 12:21

## 2025-05-14 RX ADMIN — FAMOTIDINE 20 MG: 20 TABLET, FILM COATED ORAL at 21:21

## 2025-05-14 RX ADMIN — GUAIFENESIN AND DEXTROMETHORPHAN 5 ML: 100; 10 SYRUP ORAL at 09:18

## 2025-05-14 RX ADMIN — DIPHENHYDRAMINE HYDROCHLORIDE 25 MG: 25 TABLET ORAL at 21:21

## 2025-05-14 RX ADMIN — INSULIN LISPRO 8 UNITS: 100 INJECTION, SOLUTION INTRAVENOUS; SUBCUTANEOUS at 21:22

## 2025-05-14 RX ADMIN — INSULIN LISPRO 4 UNITS: 100 INJECTION, SOLUTION INTRAVENOUS; SUBCUTANEOUS at 17:38

## 2025-05-14 RX ADMIN — IPRATROPIUM BROMIDE AND ALBUTEROL SULFATE 1 DOSE: .5; 3 SOLUTION RESPIRATORY (INHALATION) at 19:15

## 2025-05-14 RX ADMIN — BUDESONIDE 500 MCG: 0.5 INHALANT RESPIRATORY (INHALATION) at 05:56

## 2025-05-14 RX ADMIN — ENOXAPARIN SODIUM 30 MG: 100 INJECTION SUBCUTANEOUS at 09:18

## 2025-05-14 RX ADMIN — BUDESONIDE 500 MCG: 0.5 INHALANT RESPIRATORY (INHALATION) at 19:15

## 2025-05-14 RX ADMIN — ENOXAPARIN SODIUM 30 MG: 100 INJECTION SUBCUTANEOUS at 21:22

## 2025-05-14 RX ADMIN — LEVOTHYROXINE SODIUM 112 MCG: 0.11 TABLET ORAL at 06:10

## 2025-05-14 RX ADMIN — GUAIFENESIN AND DEXTROMETHORPHAN 5 ML: 100; 10 SYRUP ORAL at 03:30

## 2025-05-14 RX ADMIN — INSULIN GLARGINE 30 UNITS: 100 INJECTION, SOLUTION SUBCUTANEOUS at 21:22

## 2025-05-14 RX ADMIN — DIPHENHYDRAMINE HYDROCHLORIDE 25 MG: 25 TABLET ORAL at 03:41

## 2025-05-14 RX ADMIN — FUROSEMIDE 20 MG: 20 TABLET ORAL at 09:19

## 2025-05-14 RX ADMIN — GUAIFENESIN AND DEXTROMETHORPHAN 5 ML: 100; 10 SYRUP ORAL at 17:38

## 2025-05-14 RX ADMIN — INSULIN LISPRO 10 UNITS: 100 INJECTION, SOLUTION INTRAVENOUS; SUBCUTANEOUS at 17:38

## 2025-05-14 NOTE — PLAN OF CARE
Problem: Safety - Adult  Goal: Free from fall injury  5/14/2025 1132 by Chhaya Barrett RN  Outcome: Progressing  Note: Pt remains free from falls.  Safety precautions in place.  Bed in lowest position, bed/chair wheels locked, call light with in reach, bedside table in reach, bed/chair alarm on, fall risk wrist band on.

## 2025-05-14 NOTE — PROGRESS NOTES
Jaden Denise  5/14/2025  [id number]    Chief Complaint: Acute respiratory failure with hypoxia (HCC)    Subjective    No acute events overnight.     Patient reports that she is doing well. She is breathing comfortably. Discussed hyperglycemia. Patient says she is eating more fruit here in the hospital than normal. She is having some loose stool, but denies abdominal pain/cramping/bloating. Last Bowel Movement:  05/14/25. She is concerned about transportation home from the hospital. She also wants to transition her medical care to [facility].           Objective    Patient Vitals for the past 24 hrs:   BP Temp Temp src Pulse Resp SpO2 Weight   05/14/25 0909 126/85 97.4 °F (36.3 °C) Oral 100 16 90 % --   05/14/25 0900 -- -- -- (!) 110 -- 91 % --   05/14/25 0855 -- -- -- (!) 110 -- 93 % --   05/14/25 0850 -- -- -- (!) 144 -- (!) 87 % --   05/14/25 0840 -- -- -- (!) 131 -- (!) 86 % --   05/14/25 0611 -- -- -- -- -- -- (!) 146.9 kg (323 lb 14.4 oz)   05/13/25 2100 -- -- -- 71 17 92 % --   05/13/25 1930 (!) 140/67 98.1 °F (36.7 °C) Oral (!) 107 20 (!) 88 % --     Patient Vitals for the past 96 hrs (Last 3 readings):   Weight   05/14/25 0611 (!) 146.9 kg (323 lb 14.4 oz)   05/13/25 1230 (!) 148 kg (326 lb 4.8 oz)   05/13/25 0513 (!) 148.7 kg (327 lb 12.8 oz)      Gen: No distress.  HEENT: Normocephalic, atraumatic.  CV: Regular rate and rhythm. Extremities warm, well perfused.   Resp: No respiratory distress on 1 L O2 via NC. CTAB.  Abd: Soft, nontender.  Ext: Trace BLE edema.  Skin: Diffuse excoriations.   Neuro: Alert, oriented, appropriately interactive.     Laboratory data:   Na/K/Cl/CO2:  137/4.4/100/26 (05/14 0639)   BUN/Cr/glu/ALT/AST/amyl/lip:  11/0.7/--/--/--/--/-- (05/14 0639)    WBC/Hgb/Hct/Plts:  9.2/14.9/45.5/196 (05/14 0639)     Therapy progress:       PT    Supine to Sit: Supervision or touching assistance  Sit to Supine: Supervision or touching assistance   Sit to Stand: Supervision or touching

## 2025-05-14 NOTE — PROGRESS NOTES
[facility] - Inpatient Rehabilitation Department   Phone: [phone]    Physical Therapy    [] Initial Evaluation            [x] Daily Treatment Note         [] Discharge Summary      Patient: Jaden Denise   : 1965   MRN: [id number]   Date of Service:  2025  Admitting Diagnosis: Acute respiratory failure with hypoxia (HCC)  Current Admission Summary: 60 y.o. female with PMHx notable for T2DM, COPD, hypothyroidism, tobacco use disorder, morbid obesity who presented on 2025 with dyspnea and cough.  She was hypoxic on room air, CT chest showed patchy bilateral infiltrates, left greater than right.  She required NIV with BiPAP.  She was started on broad-spectrum IV antibiotics, IV steroids and breathing treatments for COPD exacerbation and bilateral multifocal pneumonia.  Pulmonology consulted, following.  She completed course of IV ceftriaxone and azithromycin.  She initially required high flow nasal cannula, but has been able to wean down to low-flow.  She is requiring BiPAP overnight, with concern for underlying ARISTEO.  She was also diuresed with IV Lasix, now transitioned to oral.   Past Medical History:  has a past medical history of Anxiety, Bladder incontinence, Contact dermatitis, COPD (chronic obstructive pulmonary disease) (HCC), Depression, Diabetes mellitus (HCC), Lymphedema, Neuropathy, Rosacea, and Thyroid disease.  Past Surgical History:  has a past surgical history that includes Spinal fusion; Ovary removal (Left); Endometrial ablation; Tubal ligation (Bilateral); and Tonsillectomy.  Discharge Recommendations: Home w/ HHPT   DME Required For Discharge:  Home O2 pending progress, patient owns ambulatory device required  Precautions/Restrictions: high fall risk  Weight Bearing Restrictions: no restrictions     Required Braces/Orthotics: no braces required  Positional Restrictions:no positional restrictions    Pre-Admission Information   Lives With: son (son has dialysis 3

## 2025-05-14 NOTE — PLAN OF CARE
Problem: Chronic Conditions and Co-morbidities  Goal: Patient's chronic conditions and co-morbidity symptoms are monitored and maintained or improved  Outcome: Progressing  Flowsheets (Taken 5/14/2025 0309)  Care Plan - Patient's Chronic Conditions and Co-Morbidity Symptoms are Monitored and Maintained or Improved:   Monitor and assess patient's chronic conditions and comorbid symptoms for stability, deterioration, or improvement   Collaborate with multidisciplinary team to address chronic and comorbid conditions and prevent exacerbation or deterioration     Problem: Discharge Planning  Goal: Discharge to home or other facility with appropriate resources  Outcome: Progressing  Flowsheets (Taken 5/14/2025 0309)  Discharge to home or other facility with appropriate resources: Identify barriers to discharge with patient and caregiver     Problem: Pain  Goal: Verbalizes/displays adequate comfort level or baseline comfort level  Outcome: Progressing  Flowsheets (Taken 5/14/2025 0309)  Verbalizes/displays adequate comfort level or baseline comfort level: Encourage patient to monitor pain and request assistance     Problem: Skin/Tissue Integrity  Goal: Skin integrity remains intact  Description: 1.  Monitor for areas of redness and/or skin breakdown2.  Assess vascular access sites hourly3.  Every 4-6 hours minimum:  Change oxygen saturation probe site4.  Every 4-6 hours:  If on nasal continuous positive airway pressure, respiratory therapy assess nares and determine need for appliance change or resting period  Outcome: Progressing  Flowsheets (Taken 5/14/2025 0309)  Skin Integrity Remains Intact: Monitor for areas of redness and/or skin breakdown     Problem: Safety - Adult  Goal: Free from fall injury  Outcome: Progressing     Problem: ABCDS Injury Assessment  Goal: Absence of physical injury  Outcome: Progressing

## 2025-05-14 NOTE — CARE COORDINATION
Discharge Planning Note:     (SW) met with patient at bedside re discharge needs. Patient was provided with [facility] provider list. SW discussed discharge transportation. Patient reported that ambulance transport would not be covered through her insurance so she would rather Uber/Lyft transport home. She asked for a later discharge time (around 6PM) on 5/16, in order to give her son adequate time to recover from his HD and be able to assist with her discharge.  Patient is agreeable to [facility] for home healthcare. FRANCK updated.   Electronically signed by LIMA Potter on 5/14/25 at 4:18 PM EDT

## 2025-05-14 NOTE — PROGRESS NOTES
[facility] - Inpatient Rehabilitation Department   Phone: [phone]    Occupational Therapy    [] Initial Evaluation            [x] Daily Treatment Note         [] Discharge Summary      Patient: Jaden Denise   : 1965   MRN: [id number]   Date of Service:  2025    Admitting Diagnosis:  Acute respiratory failure with hypoxia (HCC)  Current Admission Summary:   Jaden Denise is a 60 y.o. female with PMHx notable for T2DM, COPD, hypothyroidism, tobacco use disorder, morbid obesity who presented on 2025 with dyspnea and cough.  She was hypoxic on room air, CT chest showed patchy bilateral infiltrates, left greater than right.  She required NIV with BiPAP.  She was started on broad-spectrum IV antibiotics, IV steroids and breathing treatments for COPD exacerbation and bilateral multifocal pneumonia.  Pulmonology consulted, following.  She completed course of IV ceftriaxone and azithromycin.  She initially required high flow nasal cannula, but has been able to wean down to low-flow.  She is requiring BiPAP overnight, with concern for underlying ARISTEO.  She was also diuresed with IV Lasix, now transitioned to oral.   Past Medical History:  has a past medical history of Anxiety, Bladder incontinence, Contact dermatitis, COPD (chronic obstructive pulmonary disease) (HCC), Depression, Diabetes mellitus (HCC), Lymphedema, Neuropathy, Rosacea, and Thyroid disease.  Past Surgical History:  has a past surgical history that includes Spinal fusion; Ovary removal (Left); Endometrial ablation; Tubal ligation (Bilateral); and Tonsillectomy.    Discharge Recommendations: Home with OT     DME Required For Discharge: reacher, tub transfer bench, long handled sponge, grab bars, pulse oximeter     Precautions/Restrictions: high fall risk  Weight Bearing Restrictions: no restrictions    Required Braces/Orthotics: no braces required  Positional Restrictions:no positional restrictions    Pre-Admission

## 2025-05-14 NOTE — PROGRESS NOTES
05/14/25 1205   Resting (Room Air)   SpO2 88   HR 96   Resting (On O2)   SpO2 90      O2 Device Nasal cannula   O2 Flow Rate (l/min) 1 l/min   Comments 2 L nc = 92%    During Walk (On O2)   SpO2 89      O2 Device Nasal cannula   O2 Flow Rate (l/min) 2 l/min   Need Additional O2 Flow Rate Rows Yes   O2 Flow Rate (l/min) 3 l/min   O2 Saturation 90%,    O2 Flow Rate (l/min) 4 l/min   O2 Saturation 92%,    Walk/Assistance Device Ambulation   Rate of Dyspnea 2   Symptoms Fatigue;Shortness of breath   After Walk   SpO2 95   HR 95   O2 Device Nasal cannula   O2 Flow Rate (l/min) 4 l/min   Rate of Dyspnea 1   Does the Patient Qualify for Home O2 Yes   Liter Flow at Rest 2   Liter Flow on Exertion 4   Does the Patient Need Portable Oxygen Tanks Yes

## 2025-05-14 NOTE — DISCHARGE INSTR - COC
Continuity of Care Form    Patient Name: Jaden Denise   :  1965  MRN:  [id number]    Admit date:  2025  Discharge date:  2025    Code Status Order: Full Code   Advance Directives:     Admitting Physician:  Abdon Sen MD  PCP: No primary care provider on file.    Discharging Nurse: VALERIO Montano   Discharging Hospital Unit/Room#: ARU-4910/4910-01  Discharging Unit Phone Number: [phone]    Emergency Contact:   Extended Emergency Contact Information  Primary Emergency Contact: Chapito Denise  Home Phone: [phone]  Relation: Child    Past Surgical History:  Past Surgical History:   Procedure Laterality Date    ENDOMETRIAL ABLATION      x 2    OVARY REMOVAL Left     SPINAL FUSION      TONSILLECTOMY      TUBAL LIGATION Bilateral        Immunization History:   Immunization History   Administered Date(s) Administered    COVID-19, PFIZER Bivalent, DO NOT Dilute, (age 12y+), IM, 30 mcg/0.3 mL 2022    COVID-19, PFIZER PURPLE top, DILUTE for use, (age 12 y+), 30mcg/0.3mL 2021, 2021, 2021       Active Problems:  Patient Active Problem List   Diagnosis Code    Bilateral edema of lower extremity R60.0    Chronic low back pain M54.50, G89.29    Chronic shoulder pain M25.519, G89.29    GERD (gastroesophageal reflux disease) K21.9    Mixed anxiety and depressive disorder F41.8    Mixed hyperlipidemia E78.2    S/P lumbar fusion Z98.1    Pneumonia due to infectious agent J18.9    Urticarial rash L50.9    COPD exacerbation (HCC) J44.1    Acute respiratory failure with hypoxia (HCC) J96.01       Isolation/Infection:   Isolation            No Isolation          Patient Infection Status    None to display              Nurse Assessment:  Last Vital Signs: /85   Pulse 100   Temp 97.4 °F (36.3 °C) (Oral)   Resp 16   Ht 1.702 m (5' 7\")   Wt (!) 146.9 kg (323 lb 14.4 oz)   SpO2 90%   BMI 50.73 kg/m²     Last documented pain score (0-10 scale): Pain Level: 0  Last Weight:   Wt

## 2025-05-15 LAB
GLUCOSE BLD-MCNC: 189 MG/DL (ref 70–99)
GLUCOSE BLD-MCNC: 223 MG/DL (ref 70–99)
GLUCOSE BLD-MCNC: 244 MG/DL (ref 70–99)
GLUCOSE BLD-MCNC: 284 MG/DL (ref 70–99)
PERFORMED ON: ABNORMAL

## 2025-05-15 PROCEDURE — 97530 THERAPEUTIC ACTIVITIES: CPT

## 2025-05-15 PROCEDURE — 97110 THERAPEUTIC EXERCISES: CPT

## 2025-05-15 PROCEDURE — 1280000000 HC REHAB R&B

## 2025-05-15 PROCEDURE — 6360000002 HC RX W HCPCS: Performed by: STUDENT IN AN ORGANIZED HEALTH CARE EDUCATION/TRAINING PROGRAM

## 2025-05-15 PROCEDURE — 97112 NEUROMUSCULAR REEDUCATION: CPT

## 2025-05-15 PROCEDURE — 94640 AIRWAY INHALATION TREATMENT: CPT

## 2025-05-15 PROCEDURE — 94660 CPAP INITIATION&MGMT: CPT

## 2025-05-15 PROCEDURE — 6370000000 HC RX 637 (ALT 250 FOR IP): Performed by: STUDENT IN AN ORGANIZED HEALTH CARE EDUCATION/TRAINING PROGRAM

## 2025-05-15 PROCEDURE — 97535 SELF CARE MNGMENT TRAINING: CPT

## 2025-05-15 PROCEDURE — 2700000000 HC OXYGEN THERAPY PER DAY

## 2025-05-15 PROCEDURE — 97116 GAIT TRAINING THERAPY: CPT

## 2025-05-15 RX ADMIN — GUAIFENESIN AND DEXTROMETHORPHAN 5 ML: 100; 10 SYRUP ORAL at 12:32

## 2025-05-15 RX ADMIN — INSULIN LISPRO 10 UNITS: 100 INJECTION, SOLUTION INTRAVENOUS; SUBCUTANEOUS at 08:50

## 2025-05-15 RX ADMIN — INSULIN LISPRO 10 UNITS: 100 INJECTION, SOLUTION INTRAVENOUS; SUBCUTANEOUS at 18:38

## 2025-05-15 RX ADMIN — ARFORMOTEROL TARTRATE 15 MCG: 15 SOLUTION RESPIRATORY (INHALATION) at 05:28

## 2025-05-15 RX ADMIN — OXYBUTYNIN CHLORIDE 15 MG: 5 TABLET, EXTENDED RELEASE ORAL at 08:49

## 2025-05-15 RX ADMIN — FLUTICASONE PROPIONATE 1 SPRAY: 50 SPRAY, METERED NASAL at 08:51

## 2025-05-15 RX ADMIN — ARFORMOTEROL TARTRATE 15 MCG: 15 SOLUTION RESPIRATORY (INHALATION) at 20:08

## 2025-05-15 RX ADMIN — IPRATROPIUM BROMIDE AND ALBUTEROL SULFATE 1 DOSE: .5; 3 SOLUTION RESPIRATORY (INHALATION) at 05:28

## 2025-05-15 RX ADMIN — INSULIN LISPRO 8 UNITS: 100 INJECTION, SOLUTION INTRAVENOUS; SUBCUTANEOUS at 20:51

## 2025-05-15 RX ADMIN — INSULIN LISPRO 10 UNITS: 100 INJECTION, SOLUTION INTRAVENOUS; SUBCUTANEOUS at 18:36

## 2025-05-15 RX ADMIN — INSULIN GLARGINE 30 UNITS: 100 INJECTION, SOLUTION SUBCUTANEOUS at 20:51

## 2025-05-15 RX ADMIN — BUDESONIDE 500 MCG: 0.5 INHALANT RESPIRATORY (INHALATION) at 05:28

## 2025-05-15 RX ADMIN — LEVOTHYROXINE SODIUM 112 MCG: 0.11 TABLET ORAL at 05:20

## 2025-05-15 RX ADMIN — FAMOTIDINE 20 MG: 20 TABLET, FILM COATED ORAL at 20:50

## 2025-05-15 RX ADMIN — ENOXAPARIN SODIUM 30 MG: 100 INJECTION SUBCUTANEOUS at 08:49

## 2025-05-15 RX ADMIN — INSULIN LISPRO 4 UNITS: 100 INJECTION, SOLUTION INTRAVENOUS; SUBCUTANEOUS at 08:50

## 2025-05-15 RX ADMIN — IPRATROPIUM BROMIDE AND ALBUTEROL SULFATE 1 DOSE: .5; 3 SOLUTION RESPIRATORY (INHALATION) at 20:08

## 2025-05-15 RX ADMIN — ENOXAPARIN SODIUM 30 MG: 100 INJECTION SUBCUTANEOUS at 20:50

## 2025-05-15 RX ADMIN — FUROSEMIDE 20 MG: 20 TABLET ORAL at 08:49

## 2025-05-15 RX ADMIN — GUAIFENESIN AND DEXTROMETHORPHAN 5 ML: 100; 10 SYRUP ORAL at 05:20

## 2025-05-15 RX ADMIN — INSULIN LISPRO 4 UNITS: 100 INJECTION, SOLUTION INTRAVENOUS; SUBCUTANEOUS at 12:32

## 2025-05-15 RX ADMIN — GUAIFENESIN AND DEXTROMETHORPHAN 5 ML: 100; 10 SYRUP ORAL at 17:20

## 2025-05-15 RX ADMIN — FAMOTIDINE 20 MG: 20 TABLET, FILM COATED ORAL at 08:49

## 2025-05-15 RX ADMIN — INSULIN LISPRO 10 UNITS: 100 INJECTION, SOLUTION INTRAVENOUS; SUBCUTANEOUS at 12:32

## 2025-05-15 RX ADMIN — INSULIN GLARGINE 30 UNITS: 100 INJECTION, SOLUTION SUBCUTANEOUS at 08:50

## 2025-05-15 RX ADMIN — GUAIFENESIN AND DEXTROMETHORPHAN 5 ML: 100; 10 SYRUP ORAL at 23:50

## 2025-05-15 RX ADMIN — DIPHENHYDRAMINE HYDROCHLORIDE 25 MG: 25 TABLET ORAL at 18:40

## 2025-05-15 RX ADMIN — BUDESONIDE 500 MCG: 0.5 INHALANT RESPIRATORY (INHALATION) at 20:08

## 2025-05-15 RX ADMIN — DIPHENHYDRAMINE HYDROCHLORIDE 25 MG: 25 TABLET ORAL at 05:20

## 2025-05-15 NOTE — PROGRESS NOTES
Jaden Denise  5/15/2025  [id number]    Chief Complaint: Acute respiratory failure with hypoxia (HCC)    Subjective    No acute events overnight.     Patient reports that she is doing well today. Denies any fevers/chills, chest pain, dyspnea, abdominal pain. Still having itching related to rash, but somewhat improved from prior. Last Bowel Movement:  05/14/25.          Objective    Patient Vitals for the past 24 hrs:   BP Temp Temp src Pulse Resp SpO2 Weight   05/15/25 0837 128/73 97.8 °F (36.6 °C) Oral (!) 103 17 (!) 88 % --   05/15/25 0528 -- -- -- -- -- 92 % --   05/15/25 0521 -- -- -- -- -- -- (!) 149.7 kg (330 lb)   05/15/25 0025 -- -- -- -- -- 92 % --   05/14/25 2115 128/67 98 °F (36.7 °C) Oral (!) 107 18 (!) 89 % --   05/14/25 1915 -- -- -- 74 16 91 % --     Patient Vitals for the past 96 hrs (Last 3 readings):   Weight   05/15/25 0521 (!) 149.7 kg (330 lb)   05/14/25 0611 (!) 146.9 kg (323 lb 14.4 oz)   05/13/25 1230 (!) 148 kg (326 lb 4.8 oz)      Gen: No distress.  HEENT: Normocephalic, atraumatic.  CV: Regular rate and rhythm. Extremities warm, well perfused.   Resp: No respiratory distress on 2 L O2 via NC. CTAB.  Abd: Soft, nontender.  Ext: Trace BLE edema.  Skin: Diffuse excoriations arms and legs.  Neuro: Alert, oriented, appropriately interactive.     Laboratory data:   Na/K/Cl/CO2:  137/4.4/100/26 (05/14 0639)   BUN/Cr/glu/ALT/AST/amyl/lip:  11/0.7/--/--/--/--/-- (05/14 0639)    WBC/Hgb/Hct/Plts:  9.2/14.9/45.5/196 (05/14 0639)     Therapy progress:       PT    Supine to Sit: Supervision or touching assistance  Sit to Supine: Supervision or touching assistance   Sit to Stand: Independent  Chair/Bed to Chair Transfer: Supervision or touching assistance  Car Transfer: Supervision or touching assistance  Ambulation 10 ft: Supervision or touching assistance  Ambulation 50 ft: Supervision or touching assistance  Ambulation 150 ft: Supervision or touching assistance  Stairs - 1 Step: Supervision or

## 2025-05-15 NOTE — RT PROTOCOL NOTE
RT Nebulizer Bronchodilator Protocol Note    There is a bronchodilator order in the chart from a provider indicating to follow the RT Bronchodilator Protocol and there is an “Initiate RT Bronchodilator Protocol” order as well (see protocol at bottom of note).    CXR Findings:  No results found.    The findings from the last RT Protocol Assessment were as follows:  Smoking: Chronic pulmonary disease  Respiratory Pattern: Regular pattern and RR 12-20 bpm  Breath Sounds: Slightly diminished and/or crackles  Cough: Strong, spontaneous, non-productive  Indication for Bronchodilator Therapy:    Bronchodilator Assessment Score: 4    Aerosolized bronchodilator medication orders have been revised according to the RT Nebulizer Bronchodilator Protocol below.    Respiratory Therapist to perform RT Therapy Protocol Assessment initially then follow the protocol.  Repeat RT Therapy Protocol Assessment PRN for score 0-3 or on second treatment, BID, and PRN for scores above 3.    No Indications - adjust the frequency to every 6 hours PRN wheezing or bronchospasm, if no treatments needed after 48 hours then discontinue using Per Protocol order mode.     If indication present, adjust the RT bronchodilator orders based on the Bronchodilator Assessment Score as indicated below.  If a patient is on this medication at home then do not decrease Frequency below that used at home.    0-3 - enter or revise RT bronchodilator order(s) to equivalent RT Bronchodilator order with Frequency of every 4 hours PRN for wheezing or increased work of breathing using Per Protocol order mode.       4-6 - enter or revise RT Bronchodilator order(s) to two equivalent RT bronchodilator orders with one order with BID Frequency and one order with Frequency of every 4 hours PRN wheezing or increased work of breathing using Per Protocol order mode.         7-10 - enter or revise RT Bronchodilator order(s) to two equivalent RT bronchodilator orders with one order 
equivalent RT Bronchodilator order with Frequency of every 4 hours PRN for wheezing or increased work of breathing using Per Protocol order mode.        4-6 - enter or revise RT Bronchodilator order(s) to two equivalent RT bronchodilator orders with one order with BID Frequency and one order with Frequency of every 4 hours PRN wheezing or increased work of breathing using Per Protocol order mode.        7-10 - enter or revise RT Bronchodilator order(s) to two equivalent RT bronchodilator orders with one order with TID Frequency and one order with Frequency of every 4 hours PRN wheezing or increased work of breathing using Per Protocol order mode.       11-13 - enter or revise RT Bronchodilator order(s) to one equivalent RT bronchodilator order with QID Frequency and an Albuterol order with Frequency of every 4 hours PRN wheezing or increased work of breathing using Per Protocol order mode.      Greater than 13 - enter or revise RT Bronchodilator order(s) to one equivalent RT bronchodilator order with every 4 hours Frequency and an Albuterol order with Frequency of every 2 hours PRN wheezing or increased work of breathing using Per Protocol order mode.     RT to enter RT Home Evaluation for COPD & MDI Assessment order using Per Protocol order mode.    Electronically signed by Kiera Rich RCP on 5/15/2025 at 4:46 AM

## 2025-05-15 NOTE — PLAN OF CARE
Problem: Chronic Conditions and Co-morbidities  Goal: Patient's chronic conditions and co-morbidity symptoms are monitored and maintained or improved  5/15/2025 0953 by Eloisa Sierra RN  Outcome: Progressing  5/15/2025 0107 by Gauri Pham RN  Outcome: Progressing     Problem: Discharge Planning  Goal: Discharge to home or other facility with appropriate resources  5/15/2025 0953 by Eloisa Sierra RN  Outcome: Progressing  5/15/2025 0107 by Gauri Pham RN  Outcome: Progressing     Problem: Pain  Goal: Verbalizes/displays adequate comfort level or baseline comfort level  5/15/2025 0953 by Eloisa Sierra RN  Outcome: Progressing  5/15/2025 0107 by Gauri Pham RN  Outcome: Progressing  Flowsheets (Taken 5/14/2025 2115)  Verbalizes/displays adequate comfort level or baseline comfort level: Encourage patient to monitor pain and request assistance     Problem: Skin/Tissue Integrity  Goal: Skin integrity remains intact  Description: 1.  Monitor for areas of redness and/or skin breakdown2.  Assess vascular access sites hourly3.  Every 4-6 hours minimum:  Change oxygen saturation probe site4.  Every 4-6 hours:  If on nasal continuous positive airway pressure, respiratory therapy assess nares and determine need for appliance change or resting period  5/15/2025 0953 by Eloisa Sierra RN  Outcome: Progressing  5/15/2025 0107 by Gauri Pham RN  Outcome: Progressing     Problem: Safety - Adult  Goal: Free from fall injury  5/15/2025 0953 by Eloisa Sierra RN  Outcome: Progressing  5/15/2025 0107 by Gauri Pham RN  Outcome: Progressing     Problem: ABCDS Injury Assessment  Goal: Absence of physical injury  5/15/2025 0953 by Eloisa Sierra RN  Outcome: Progressing  5/15/2025 0107 by Gauri Pham RN  Outcome: Progressing

## 2025-05-15 NOTE — PROGRESS NOTES
[facility] - Inpatient Rehabilitation Department   Phone: [phone]    Physical Therapy    [] Initial Evaluation            [x] Daily Treatment Note         [x] Discharge Summary      Patient: Jaden Denise   : 1965   MRN: [id number]   Date of Service:  5/15/2025  Admitting Diagnosis: Acute respiratory failure with hypoxia (HCC)  Current Admission Summary: 60 y.o. female with PMHx notable for T2DM, COPD, hypothyroidism, tobacco use disorder, morbid obesity who presented on 2025 with dyspnea and cough.  She was hypoxic on room air, CT chest showed patchy bilateral infiltrates, left greater than right.  She required NIV with BiPAP.  She was started on broad-spectrum IV antibiotics, IV steroids and breathing treatments for COPD exacerbation and bilateral multifocal pneumonia.  Pulmonology consulted, following.  She completed course of IV ceftriaxone and azithromycin.  She initially required high flow nasal cannula, but has been able to wean down to low-flow.  She is requiring BiPAP overnight, with concern for underlying ARISTEO.  She was also diuresed with IV Lasix, now transitioned to oral.   Past Medical History:  has a past medical history of Anxiety, Bladder incontinence, Contact dermatitis, COPD (chronic obstructive pulmonary disease) (HCC), Depression, Diabetes mellitus (HCC), Lymphedema, Neuropathy, Rosacea, and Thyroid disease.  Past Surgical History:  has a past surgical history that includes Spinal fusion; Ovary removal (Left); Endometrial ablation; Tubal ligation (Bilateral); and Tonsillectomy.  Discharge Recommendations: Home w/ HHPT and family assist PRN   DME Required For Discharge:  Home O2, patient owns ambulatory device required  Precautions/Restrictions: high fall risk  Weight Bearing Restrictions: no restrictions     Required Braces/Orthotics: no braces required  Positional Restrictions:no positional restrictions    Pre-Admission Information   Lives With: son (son has

## 2025-05-15 NOTE — PROGRESS NOTES
[facility] - Inpatient Rehabilitation Department   Phone: [phone]    Occupational Therapy    [] Initial Evaluation            [x] Daily Treatment Note         [x] Discharge Summary      Patient: Jaden Denise   : 1965   MRN: [id number]   Date of Service:  5/15/2025    Admitting Diagnosis:  Acute respiratory failure with hypoxia (HCC)  Current Admission Summary:   Jaden Denise is a 60 y.o. female with PMHx notable for T2DM, COPD, hypothyroidism, tobacco use disorder, morbid obesity who presented on 2025 with dyspnea and cough.  She was hypoxic on room air, CT chest showed patchy bilateral infiltrates, left greater than right.  She required NIV with BiPAP.  She was started on broad-spectrum IV antibiotics, IV steroids and breathing treatments for COPD exacerbation and bilateral multifocal pneumonia.  Pulmonology consulted, following.  She completed course of IV ceftriaxone and azithromycin.  She initially required high flow nasal cannula, but has been able to wean down to low-flow.  She is requiring BiPAP overnight, with concern for underlying ARISTEO.  She was also diuresed with IV Lasix, now transitioned to oral.   Past Medical History:  has a past medical history of Anxiety, Bladder incontinence, Contact dermatitis, COPD (chronic obstructive pulmonary disease) (HCC), Depression, Diabetes mellitus (HCC), Lymphedema, Neuropathy, Rosacea, and Thyroid disease.  Past Surgical History:  has a past surgical history that includes Spinal fusion; Ovary removal (Left); Endometrial ablation; Tubal ligation (Bilateral); and Tonsillectomy.    Discharge Recommendations: Home with OT     DME Required For Discharge: tub transfer bench, long handled sponge, grab bars, pulse oximeter     Precautions/Restrictions: high fall risk  Weight Bearing Restrictions: no restrictions    Required Braces/Orthotics: no braces required  Positional Restrictions:no positional restrictions    Pre-Admission Information

## 2025-05-15 NOTE — PLAN OF CARE
Problem: Chronic Conditions and Co-morbidities  Goal: Patient's chronic conditions and co-morbidity symptoms are monitored and maintained or improved  Outcome: Progressing     Problem: Discharge Planning  Goal: Discharge to home or other facility with appropriate resources  Outcome: Progressing     Problem: Pain  Goal: Verbalizes/displays adequate comfort level or baseline comfort level  Outcome: Progressing  Flowsheets (Taken 5/14/2025 2115)  Verbalizes/displays adequate comfort level or baseline comfort level: Encourage patient to monitor pain and request assistance     Problem: Skin/Tissue Integrity  Goal: Skin integrity remains intact  Description: 1.  Monitor for areas of redness and/or skin breakdown2.  Assess vascular access sites hourly3.  Every 4-6 hours minimum:  Change oxygen saturation probe site4.  Every 4-6 hours:  If on nasal continuous positive airway pressure, respiratory therapy assess nares and determine need for appliance change or resting period  Outcome: Progressing     Problem: Safety - Adult  Goal: Free from fall injury  5/15/2025 0107 by Gauri Pham, RN  Outcome: Progressing  5/14/2025 1132 by Chhaya Barrett, RN  Outcome: Progressing  Note: Pt remains free from falls.  Safety precautions in place.  Bed in lowest position, bed/chair wheels locked, call light with in reach, bedside table in reach, bed/chair alarm on, fall risk wrist band on.     Problem: ABCDS Injury Assessment  Goal: Absence of physical injury  Outcome: Progressing

## 2025-05-15 NOTE — DISCHARGE INSTRUCTIONS
We hope your stay on rehab has exceeded your expectations. Once again the entire Acute Rehab Staff at [facility] wish to thank you for allowing us the privilege to care for you.         A few days after you are discharged from Rehab, you will receive a survey (Son  Gangabriella) in the mail or through email.  This is a nationally distributed survey sent to thousands of rehab patients throughout the nation.              It is very important to everyone on the rehab unit that we receive feedback based on your experience.          Thank you, we wish you good health always,         Acute Rehab Team      Hospital Preference:     __[facility]        Medical Diagnosis/Conditions    _______________________ (free text)    Emergency Contact:    ________________________________________Phone#________________________      Advanced Directives:    Code Status: ?  [x]  Full Code  ?  []  DNR  ? []  DNRCC  ? []  DNRCC - Arrest    (as of date of discharge:  _________)      Medical POA: ?  []   Yes ______________________________ ?  []   No                                       (Name and phone number)                     Living Will:   ?   []   Yes    ?  []   No        Insurance Information:    _______________________ (free text)      Individual Responsible  for the coordination of the discharge/follow up:    ______________________________________________________    Functional Status:    VISUAL DEFICITS:    Yes [x]  No  []       If yes, assisted device:   Wears Glasses Yes [x]  No  []  Wears Contacts  Yes []  No  []  Legally Blind Yes []  No  []    HEARING DEFICITS:    Yes []  No  [x]       If yes, assisted device:   Wears Hearing Aids Yes []  No  []  Pocket Talker  Yes []  No  []       Physical Therapist & Contact #: Anuj Chapin PT, DPT - [phone]  OccupationalTherapist & Contact #: ALVA Huffman OTR/L [phone]   Speech Therapist & Contact #:       Activities of Daily Living:     ADL's - Adaptive

## 2025-05-15 NOTE — PROGRESS NOTES
05/15/25 0446   RT Protocol   History Pulmonary Disease 2   Respiratory pattern 2   Breath sounds 2   Cough 0   Bronchodilator Assessment Score 6

## 2025-05-15 NOTE — CARE COORDINATION
Discharge Planning Note:     ([facility]) spoke with Anuj Thompson re patient's DME needs. He will follow up with patient.     Discharge order to be faxed to [facility] upon discharge.  [facility]  [address]  [place] [id number]  Phone [phone]  Fax: [phone]      Electronically signed by LIMA Potter on 5/15/25 at 4:32 PM EDT

## 2025-05-15 NOTE — PROGRESS NOTES
[facility]  Diabetes Education   Progress Note       NAME:  Jaden Denise  MEDICAL RECORD NUMBER:  [id number]  AGE: 60 y.o.   GENDER: female  : 1965  TODAY'S DATE:  5/15/2025    Subjective   Reason for Diabetes Education Evaluation and Assessment: DM, new to mealtime insulin    Visit Type: evaluation      Jaden Denise is a 60 y.o. female referred by:  [x] Physician  [] Nursing  [] Chart Review   [] Other:     Visited pt for DM education. Pt sees pharmacist at [facility] for medication management. States she started basal insulin but her glucoses were not yet well-controlled.    PAST MEDICAL HISTORY        Diagnosis Date    Anxiety     Bladder incontinence     Contact dermatitis     Bilateral arms    COPD (chronic obstructive pulmonary disease) ([facility])     Depression     Diabetes mellitus (HCC)     Lymphedema     Neuropathy     Rosacea     Thyroid disease        PAST SURGICAL HISTORY    Past Surgical History:   Procedure Laterality Date    ENDOMETRIAL ABLATION      x 2    OVARY REMOVAL Left     SPINAL FUSION      TONSILLECTOMY      TUBAL LIGATION Bilateral        FAMILY HISTORY    No family history on file.    SOCIAL HISTORY    Social History     Tobacco Use    Smoking status: Every Day     Current packs/day: 1.00     Average packs/day: 1 pack/day for 44.4 years (44.4 ttl pk-yrs)     Types: Cigarettes     Start date:     Smokeless tobacco: Never   Vaping Use    Vaping status: Never Used   Substance Use Topics    Alcohol use: Never    Drug use: Never       ALLERGIES    Allergies   Allergen Reactions    Iodides Hives and Shortness Of Breath       MEDICATIONS     insulin glargine  30 Units SubCUTAneous BID    oxyBUTYnin  15 mg Oral Daily    ipratropium 0.5 mg-albuterol 2.5 mg  1 Dose Inhalation BID RT    semaglutide (2 MG/DOSE)  2 mg SubCUTAneous Q7 Days    enoxaparin  30 mg SubCUTAneous BID    arformoterol tartrate  15 mcg Nebulization BID RT    budesonide  0.5 mg Nebulization BID

## 2025-05-15 NOTE — DISCHARGE INSTR - DIET

## 2025-05-16 VITALS
WEIGHT: 293 LBS | TEMPERATURE: 98.1 F | BODY MASS INDEX: 45.99 KG/M2 | SYSTOLIC BLOOD PRESSURE: 126 MMHG | HEART RATE: 91 BPM | RESPIRATION RATE: 18 BRPM | DIASTOLIC BLOOD PRESSURE: 77 MMHG | OXYGEN SATURATION: 93 % | HEIGHT: 67 IN

## 2025-05-16 LAB
ANION GAP SERPL CALCULATED.3IONS-SCNC: 8 MMOL/L (ref 3–16)
BASOPHILS # BLD: 0.1 K/UL (ref 0–0.2)
BASOPHILS NFR BLD: 0.7 %
BUN SERPL-MCNC: 8 MG/DL (ref 7–20)
CALCIUM SERPL-MCNC: 8.8 MG/DL (ref 8.3–10.6)
CHLORIDE SERPL-SCNC: 102 MMOL/L (ref 99–110)
CO2 SERPL-SCNC: 26 MMOL/L (ref 21–32)
CREAT SERPL-MCNC: 0.6 MG/DL (ref 0.6–1.1)
DEPRECATED RDW RBC AUTO: 18.8 % (ref 12.4–15.4)
EOSINOPHIL # BLD: 0.3 K/UL (ref 0–0.6)
EOSINOPHIL NFR BLD: 3 %
GFR SERPLBLD CREATININE-BSD FMLA CKD-EPI: >90 ML/MIN/{1.73_M2}
GLUCOSE BLD-MCNC: 260 MG/DL (ref 70–99)
GLUCOSE BLD-MCNC: 262 MG/DL (ref 70–99)
GLUCOSE SERPL-MCNC: 251 MG/DL (ref 70–99)
HCT VFR BLD AUTO: 43.3 % (ref 36–48)
HGB BLD-MCNC: 14.2 G/DL (ref 12–16)
LYMPHOCYTES # BLD: 1.9 K/UL (ref 1–5.1)
LYMPHOCYTES NFR BLD: 22.4 %
MCH RBC QN AUTO: 27 PG (ref 26–34)
MCHC RBC AUTO-ENTMCNC: 32.8 G/DL (ref 31–36)
MCV RBC AUTO: 82.3 FL (ref 80–100)
MONOCYTES # BLD: 0.9 K/UL (ref 0–1.3)
MONOCYTES NFR BLD: 10.3 %
NEUTROPHILS # BLD: 5.3 K/UL (ref 1.7–7.7)
NEUTROPHILS NFR BLD: 63.6 %
PERFORMED ON: ABNORMAL
PERFORMED ON: ABNORMAL
PLATELET # BLD AUTO: 171 K/UL (ref 135–450)
PMV BLD AUTO: 8.4 FL (ref 5–10.5)
POTASSIUM SERPL-SCNC: 4.1 MMOL/L (ref 3.5–5.1)
RBC # BLD AUTO: 5.27 M/UL (ref 4–5.2)
SODIUM SERPL-SCNC: 136 MMOL/L (ref 136–145)
WBC # BLD AUTO: 8.3 K/UL (ref 4–11)

## 2025-05-16 PROCEDURE — 85025 COMPLETE CBC W/AUTO DIFF WBC: CPT

## 2025-05-16 PROCEDURE — 94640 AIRWAY INHALATION TREATMENT: CPT

## 2025-05-16 PROCEDURE — 6370000000 HC RX 637 (ALT 250 FOR IP): Performed by: STUDENT IN AN ORGANIZED HEALTH CARE EDUCATION/TRAINING PROGRAM

## 2025-05-16 PROCEDURE — 6360000002 HC RX W HCPCS: Performed by: STUDENT IN AN ORGANIZED HEALTH CARE EDUCATION/TRAINING PROGRAM

## 2025-05-16 PROCEDURE — 80048 BASIC METABOLIC PNL TOTAL CA: CPT

## 2025-05-16 PROCEDURE — 94660 CPAP INITIATION&MGMT: CPT

## 2025-05-16 RX ORDER — INSULIN GLARGINE 100 [IU]/ML
30 INJECTION, SOLUTION SUBCUTANEOUS 2 TIMES DAILY
Qty: 15 ADJUSTABLE DOSE PRE-FILLED PEN SYRINGE | Refills: 1 | Status: SHIPPED | OUTPATIENT
Start: 2025-05-16

## 2025-05-16 RX ORDER — INSULIN LISPRO 100 [IU]/ML
10 INJECTION, SOLUTION INTRAVENOUS; SUBCUTANEOUS
Qty: 5 ADJUSTABLE DOSE PRE-FILLED PEN SYRINGE | Refills: 3 | Status: SHIPPED | OUTPATIENT
Start: 2025-05-16

## 2025-05-16 RX ORDER — DIPHENHYDRAMINE HCL 50 MG
50 CAPSULE ORAL EVERY 6 HOURS PRN
Qty: 40 CAPSULE | Refills: 0 | Status: SHIPPED | OUTPATIENT
Start: 2025-05-16 | End: 2025-05-26

## 2025-05-16 RX ORDER — FUROSEMIDE 20 MG/1
20 TABLET ORAL DAILY
Qty: 30 TABLET | Refills: 0 | Status: SHIPPED | OUTPATIENT
Start: 2025-05-16

## 2025-05-16 RX ORDER — INSULIN LISPRO 100 [IU]/ML
0-8 INJECTION, SOLUTION INTRAVENOUS; SUBCUTANEOUS
Qty: 5 ADJUSTABLE DOSE PRE-FILLED PEN SYRINGE | Refills: 3
Start: 2025-05-16

## 2025-05-16 RX ORDER — SKIN PROTECTANT 44 G/100G
OINTMENT TOPICAL 4 TIMES DAILY PRN
Qty: 454 G | Refills: 0 | Status: SHIPPED | OUTPATIENT
Start: 2025-05-16

## 2025-05-16 RX ADMIN — INSULIN LISPRO 10 UNITS: 100 INJECTION, SOLUTION INTRAVENOUS; SUBCUTANEOUS at 08:48

## 2025-05-16 RX ADMIN — IPRATROPIUM BROMIDE AND ALBUTEROL SULFATE 1 DOSE: .5; 3 SOLUTION RESPIRATORY (INHALATION) at 05:39

## 2025-05-16 RX ADMIN — INSULIN LISPRO 10 UNITS: 100 INJECTION, SOLUTION INTRAVENOUS; SUBCUTANEOUS at 13:13

## 2025-05-16 RX ADMIN — FAMOTIDINE 20 MG: 20 TABLET, FILM COATED ORAL at 08:47

## 2025-05-16 RX ADMIN — ACETAMINOPHEN 650 MG: 325 TABLET ORAL at 06:19

## 2025-05-16 RX ADMIN — GUAIFENESIN AND DEXTROMETHORPHAN 5 ML: 100; 10 SYRUP ORAL at 13:13

## 2025-05-16 RX ADMIN — ARFORMOTEROL TARTRATE 15 MCG: 15 SOLUTION RESPIRATORY (INHALATION) at 05:39

## 2025-05-16 RX ADMIN — DIPHENHYDRAMINE HYDROCHLORIDE 25 MG: 25 TABLET ORAL at 15:26

## 2025-05-16 RX ADMIN — ENOXAPARIN SODIUM 30 MG: 100 INJECTION SUBCUTANEOUS at 08:47

## 2025-05-16 RX ADMIN — FLUTICASONE PROPIONATE 1 SPRAY: 50 SPRAY, METERED NASAL at 08:56

## 2025-05-16 RX ADMIN — BUDESONIDE 500 MCG: 0.5 INHALANT RESPIRATORY (INHALATION) at 05:39

## 2025-05-16 RX ADMIN — INSULIN GLARGINE 30 UNITS: 100 INJECTION, SOLUTION SUBCUTANEOUS at 08:49

## 2025-05-16 RX ADMIN — INSULIN LISPRO 8 UNITS: 100 INJECTION, SOLUTION INTRAVENOUS; SUBCUTANEOUS at 13:13

## 2025-05-16 RX ADMIN — FUROSEMIDE 20 MG: 20 TABLET ORAL at 08:47

## 2025-05-16 RX ADMIN — LEVOTHYROXINE SODIUM 112 MCG: 0.11 TABLET ORAL at 06:20

## 2025-05-16 RX ADMIN — OXYBUTYNIN CHLORIDE 15 MG: 5 TABLET, EXTENDED RELEASE ORAL at 08:47

## 2025-05-16 RX ADMIN — GUAIFENESIN AND DEXTROMETHORPHAN 5 ML: 100; 10 SYRUP ORAL at 06:20

## 2025-05-16 RX ADMIN — INSULIN LISPRO 8 UNITS: 100 INJECTION, SOLUTION INTRAVENOUS; SUBCUTANEOUS at 08:48

## 2025-05-16 RX ADMIN — DIPHENHYDRAMINE HYDROCHLORIDE 25 MG: 25 TABLET ORAL at 08:47

## 2025-05-16 ASSESSMENT — PAIN - FUNCTIONAL ASSESSMENT: PAIN_FUNCTIONAL_ASSESSMENT: ACTIVITIES ARE NOT PREVENTED

## 2025-05-16 ASSESSMENT — PAIN DESCRIPTION - ORIENTATION: ORIENTATION: OTHER (COMMENT)

## 2025-05-16 ASSESSMENT — PAIN SCALES - GENERAL
PAINLEVEL_OUTOF10: 0

## 2025-05-16 ASSESSMENT — PAIN DESCRIPTION - DESCRIPTORS: DESCRIPTORS: OTHER (COMMENT)

## 2025-05-16 ASSESSMENT — PAIN SCALES - WONG BAKER: WONGBAKER_NUMERICALRESPONSE: NO HURT

## 2025-05-16 ASSESSMENT — PAIN DESCRIPTION - LOCATION: LOCATION: OTHER (COMMENT)

## 2025-05-16 NOTE — CARE COORDINATION
Case Management -  Discharge Note      Patient Name: Jaden Denise                   YOB: 1965  Room: [place] [address]            Readmission Risk (Low < 19, Mod (19-27), High > 27): Readmission Risk Score: 14.8    Current PCP: No primary care provider on file.      (IMM) Important Message from Medicare:    Has pt received appropriate compliance notices before being discharged if required: NA  Compliance doc:  [] 2nd IMM; [] Code 44 [] Venegas  Date Given: na Given By: yari    PT AM-PAC:   /24  OT AM-PAC:   /24    Patient/patient representative has been educated on the benefits of HHC services and needed DME equipment after discharge from ARU as well as the possible risks of declining recommended services. Patient/patient representative has acknowledged the information provided and decided on the following discharge plan. Patient/ patient representative has been provided freedom of choice regarding service provider, supported by basic dialogue that supports the patient's individualized plan of care/goals.    Home Care Information:   Is patient resuming current home health care services: No    Home Care Agency:   See below            Services: Nsg, Ot, PT  Home Health Order Obtained: Yes and faxed to facility    Home health agency notified of discharge.       HHC agency notified of discharge:  [x] Yes [] No  [] NA    Family notified of discharge:  [x] Yes  [] No  [] NA    Facility notified of discharge:  [] Yes  [] No  [x] NA    Pt is being discharged with Outpt IV Antibiotics  [] Yes [x] No  [] NA  If yes, make sure FRANCK is faxed to HHC agency, and meds are called in to pharmacy by RN from FRANCK orders only.      Financial    Payor: [employer] [facility] / Plan: [facility] / Product Type: *No Product type* /     Pharmacy:  Potential assistance Purchasing Medications: No  Meds-to-Beds request: Yes      [facility] - [place], [place] - [address] - P [phone] - F [phone]  [address]  [address]

## 2025-05-16 NOTE — PROGRESS NOTES
Patient discharged home with home care. No IV at discharge. Discharge instructions provided and explained to patient, all questions answered, patient verbalized understanding. All belongings sent with patient including shower/ tub bench, portable O2 concentrator, and prescriptions from outpatient pharmacy. Patient transported in wheelchair to front entrance, transported home by family.

## 2025-05-16 NOTE — CARE COORDINATION
CM called [facility] [phone] and spoke to Jessika who states they did receive faxed hc orders and she already spoke to patient and has appt scheduled for tomorrow.     SKY verified with Anuj in house DME provider that he delivered oxygen to room and that pt already has bath seat.    Niharika Day RN, BSN  [phone]

## 2025-05-16 NOTE — PROGRESS NOTES
CLINICAL PHARMACY NOTE: MEDS TO BEDS    Total # of Prescriptions Filled: 6   The following medications were delivered to the patient:  Pen Needles 32 G x 4 MM   Banophen 25 mg   Petrolatum 42% oint  Insulin lispro  Lantus Solostar 100 unit/ml  Furosemide 20 mg     Additional Documentation: Rosa approved to deliver medication to patient room=signed. Charmaine will put insulin in fridge until pt discharge at 5 pm.  Page Barbour CPhT

## 2025-05-16 NOTE — PLAN OF CARE
Problem: Chronic Conditions and Co-morbidities  Goal: Patient's chronic conditions and co-morbidity symptoms are monitored and maintained or improved  Outcome: Completed  Flowsheets (Taken 5/16/2025 1646)  Care Plan - Patient's Chronic Conditions and Co-Morbidity Symptoms are Monitored and Maintained or Improved:   Monitor and assess patient's chronic conditions and comorbid symptoms for stability, deterioration, or improvement   Collaborate with multidisciplinary team to address chronic and comorbid conditions and prevent exacerbation or deterioration     Problem: Discharge Planning  Goal: Discharge to home or other facility with appropriate resources  Outcome: Completed  Flowsheets (Taken 5/16/2025 1646)  Discharge to home or other facility with appropriate resources:   Identify barriers to discharge with patient and caregiver   Arrange for needed discharge resources and transportation as appropriate   Identify discharge learning needs (meds, wound care, etc)   Refer to discharge planning if patient needs post-hospital services based on physician order or complex needs related to functional status, cognitive ability or social support system     Problem: Pain  Goal: Verbalizes/displays adequate comfort level or baseline comfort level  Outcome: Completed  Flowsheets (Taken 5/16/2025 1646)  Verbalizes/displays adequate comfort level or baseline comfort level:   Encourage patient to monitor pain and request assistance   Administer analgesics based on type and severity of pain and evaluate response   Consider cultural and social influences on pain and pain management   Assess pain using appropriate pain scale     Problem: Skin/Tissue Integrity  Goal: Skin integrity remains intact  Description: 1.  Monitor for areas of redness and/or skin breakdown2.  Assess vascular access sites hourly3.  Every 4-6 hours minimum:  Change oxygen saturation probe site4.  Every 4-6 hours:  If on nasal continuous positive airway

## 2025-05-16 NOTE — DISCHARGE SUMMARY
Physical Medicine & Rehabilitation  Discharge Summary     Patient Identification:  Jaden Denise  : 1965  Admit date: 2025  Discharge date: 25   Attending provider: Abdon Sen MD        Primary care provider: No primary care provider on file.     Discharge Diagnoses:   Patient Active Problem List   Diagnosis    Bilateral edema of lower extremity    Chronic low back pain    Chronic shoulder pain    GERD (gastroesophageal reflux disease)    Mixed anxiety and depressive disorder    Mixed hyperlipidemia    S/P lumbar fusion    Pneumonia due to infectious agent    Urticarial rash    COPD exacerbation (HCC)    Acute respiratory failure with hypoxia (HCC)       Discharge Functional Status:      Physical therapy:  Supine to Sit: Independent  Sit to Supine: Independent      Sit to Stand: Independent  Chair/Bed to Chair Transfer: Independent  Car Transfer: Independent     Ambulation 10 ft: Independent  Ambulation 50 ft: Independent  Ambulation 150 ft: Independent  Stairs - 1 Step: Independent  Stairs - 4 Step: Independent  Stairs - 12 Step: Independent    Occupational therapy:  Eating: Independent  Oral Hygiene: Independent  Bathing: Independent  Upper Body Dressing: Independent  Lower Body Dressing: Independent     Toilet Transfer: Independent  Toilet Hygiene: Independent    Speech therapy:         History of Present Illness/Acute Hospital Course:  Jaden Denise is a 60 y.o. female with PMHx notable for T2DM, COPD, hypothyroidism, tobacco use disorder, morbid obesity who presented on 2025 with dyspnea and cough.  She was hypoxic on room air, CT chest showed patchy bilateral infiltrates, left greater than right.  She required NIV with BiPAP.  She was started on broad-spectrum IV antibiotics, IV steroids and breathing treatments for COPD exacerbation and bilateral multifocal pneumonia.  Pulmonology consulted, following.  She completed course of IV ceftriaxone and azithromycin.  She initially

## 2025-05-16 NOTE — PROGRESS NOTES
ARU Discharge Assessment    Transportation  \"Has lack of transportation kept you from medical appointments, meetings, work, or from getting things needed for daily living?\"Check all that apply:  [x] A.  Yes, it has kept me from medical appointments or from getting my medications  [x] B.  Yes, it has kept me from non-medical meetings, appointments, work, or from getting things that I need  [] C.  No  [] X.  Patient unable to respond  [] Y.  Patient declines to respond    Provision of Current Reconciled Medication List to Subsequent Provider at Discharge  [] No, current reconciled medication list not provided to the subsequent provider.  [x] Yes, current reconciled medication list provided to the subsequent provider. (**Select route of transmission below**)   [] Via Electronic Health Record   [] Via Health Information Exchange Organization  [] Verbal (e.g. in person, telephone, video conferencing)  [x] Paper-based (e.g. fax, copies, printouts)   [] Other Methods (e.g. texting, email, CDs)    Provision of Current Reconciled Medication List to Patient at Discharge  [] No, current reconciled medication list not provided to the patient, family and/or caregiver.   [x] Yes, current reconciled medication list provided to the patient, family and/or caregiver.  (**Select route of transmission below**)   [x] Via Electronic Health Record (e.g., electronic access to patient portal)   [] Via Health Information Exchange Organization  [x] Verbal (e.g. in person, telephone, video conferencing)  [x] Paper-based (e.g. fax, copies, printouts)   [] Other Methods (e.g. texting, email, CDs)    Health Literacy  \"How often do you need to have someone help you when you read instructions, pamphlets, or other written material from your doctor or pharmacy?\"  []  0.  Never  [x]  1.  Rarely  []  2.  Sometimes  []  3.  Often  []  4.  Always  []  7.  Patient declines to respond  []  8.  Patient unable to respond    BIMS - **Must be completed in the

## 2025-05-19 ENCOUNTER — TELEPHONE (OUTPATIENT)
Dept: CASE MANAGEMENT | Age: 60
End: 2025-05-19

## 2025-05-19 NOTE — TELEPHONE ENCOUNTER
Patient was recent hospitalized at [facility]. Dr. Alejandre was consulted and She recommended:    Patient will need outpatient follow-up with pulmonary and sleep study     Please call patient to schedule Pulm HFU visit.    Thank you,    Lani Carpenter RN  Lung Navigator  [address] [address] [phone]  [email or web address].

## 2025-05-27 ENCOUNTER — TELEPHONE (OUTPATIENT)
Dept: PULMONOLOGY | Age: 60
End: 2025-05-27

## 2025-05-27 NOTE — TELEPHONE ENCOUNTER
Called pt twice to schedule appt.  She said she would give our number to her  to call us back to schedule appt.

## 2025-06-02 ENCOUNTER — CLINICAL DOCUMENTATION (OUTPATIENT)
Dept: CASE MANAGEMENT | Age: 60
End: 2025-06-02

## 2025-06-02 NOTE — PROGRESS NOTES
Patient due for pulm appt after hospitalization.  Mailed patient certified  reminder letter with number to schedule, [phone].

## 2025-09-04 ENCOUNTER — OFFICE VISIT (OUTPATIENT)
Dept: PULMONOLOGY | Age: 60
End: 2025-09-04
Payer: OTHER GOVERNMENT

## 2025-09-04 VITALS
SYSTOLIC BLOOD PRESSURE: 130 MMHG | DIASTOLIC BLOOD PRESSURE: 70 MMHG | BODY MASS INDEX: 45.99 KG/M2 | OXYGEN SATURATION: 93 % | WEIGHT: 293 LBS | HEART RATE: 112 BPM | HEIGHT: 67 IN

## 2025-09-04 DIAGNOSIS — E66.813 CLASS 3 SEVERE OBESITY DUE TO EXCESS CALORIES WITH SERIOUS COMORBIDITY AND BODY MASS INDEX (BMI) OF 50.0 TO 59.9 IN ADULT (HCC): ICD-10-CM

## 2025-09-04 DIAGNOSIS — G47.33 OSA (OBSTRUCTIVE SLEEP APNEA): ICD-10-CM

## 2025-09-04 DIAGNOSIS — J44.1 COPD EXACERBATION (HCC): Primary | ICD-10-CM

## 2025-09-04 DIAGNOSIS — J96.01 ACUTE RESPIRATORY FAILURE WITH HYPOXIA (HCC): ICD-10-CM

## 2025-09-04 PROCEDURE — 99215 OFFICE O/P EST HI 40 MIN: CPT | Performed by: INTERNAL MEDICINE

## 2025-09-04 PROCEDURE — G2211 COMPLEX E/M VISIT ADD ON: HCPCS | Performed by: INTERNAL MEDICINE

## 2025-09-04 RX ORDER — HYDROXYZINE HYDROCHLORIDE 25 MG/1
25 TABLET, FILM COATED ORAL DAILY PRN
COMMUNITY
Start: 2025-07-22

## 2025-09-04 RX ORDER — ALBUTEROL SULFATE 90 UG/1
2 INHALANT RESPIRATORY (INHALATION) EVERY 6 HOURS PRN
COMMUNITY
Start: 2025-03-28

## 2025-09-04 RX ORDER — IPRATROPIUM BROMIDE AND ALBUTEROL SULFATE 2.5; .5 MG/3ML; MG/3ML
1 SOLUTION RESPIRATORY (INHALATION) EVERY 4 HOURS
Qty: 360 ML | Refills: 5 | Status: SHIPPED | OUTPATIENT
Start: 2025-09-04 | End: 2026-01-02

## (undated) PROCEDURE — 5A09457 ASSISTANCE WITH RESPIRATORY VENTILATION, 24-96 CONSECUTIVE HOURS, CONTINUOUS POSITIVE AIRWAY PRESSURE: ICD-10-PCS